# Patient Record
Sex: FEMALE | Race: WHITE | NOT HISPANIC OR LATINO | Employment: OTHER | RURAL
[De-identification: names, ages, dates, MRNs, and addresses within clinical notes are randomized per-mention and may not be internally consistent; named-entity substitution may affect disease eponyms.]

---

## 2017-01-09 ENCOUNTER — TELEPHONE (OUTPATIENT)
Dept: OBSTETRICS AND GYNECOLOGY | Facility: CLINIC | Age: 22
End: 2017-01-09

## 2017-01-10 ENCOUNTER — OFFICE VISIT (OUTPATIENT)
Dept: OBSTETRICS AND GYNECOLOGY | Facility: CLINIC | Age: 22
End: 2017-01-10

## 2017-01-10 VITALS
WEIGHT: 133 LBS | HEART RATE: 82 BPM | BODY MASS INDEX: 22.71 KG/M2 | HEIGHT: 64 IN | DIASTOLIC BLOOD PRESSURE: 70 MMHG | SYSTOLIC BLOOD PRESSURE: 128 MMHG

## 2017-01-10 DIAGNOSIS — N93.9 EPISODE OF HEAVY VAGINAL BLEEDING: Primary | ICD-10-CM

## 2017-01-10 PROCEDURE — 99213 OFFICE O/P EST LOW 20 MIN: CPT | Performed by: NURSE PRACTITIONER

## 2017-01-10 RX ORDER — MEDROXYPROGESTERONE ACETATE 10 MG/1
10 TABLET ORAL DAILY
Qty: 10 TABLET | Refills: 0 | Status: SHIPPED | OUTPATIENT
Start: 2017-01-10 | End: 2017-09-14

## 2017-01-10 NOTE — PROGRESS NOTES
"Subjective   Chief Complaint   Patient presents with   • Follow-up     pt states she has been on a period for a month      Marychuy Aburto is a 21 y.o. year old G0 presenting to be seen with complaints of trouble with her menses.   Current birth control method: OCP (estrogen/progesterone).    No LMP recorded (lmp unknown).    The last time she recalls having predictable regular cycles was prior to starting on birth control in 2011.      · Additional notable symptoms: none  · Changes in weight: weight has been stable  · Recent increase in stress? no  · Is there associated pain ? yes    Past 6 month menstrual history:    Cycle Frequency: irregular  frequent   Menstrual cycle character: just in the past few days (describes as explosive diarrhea); usually would be \"normal\"   Cycle Duration: 4 - 10   Number of heavy days of flows: 3   Dysmenorrhea: moderate and affecting her activities of daily living   PMS: moderate, affecting her activities of daily living and crying daily   Intermenstrual bleeding present: {yes   Post-coital bleeding present: no     She is sexually active.  In the past 12 months there has not been new sexual partners.  Condoms are typically used.  She would not like to be screened for STD's at today's exam.     Has been on Sprintec, Michell, Sirisha, Jolessa/Seasonique, Ortho Tricyclen, Junel and Ovcon. She had a period every 21 days that last 4 days with Seasonique. Since she changed to Junel in December her moods have been all over the place and she's been bleeding almost daily. We switched her to Ovcon in early January b/c she was so nguyen and she was bleeding daily. The increase in estrogen has caused her to bleed through a super plus tampon every hour or sooner for the past 3 days. She describes it as explosive diarrhea with blood.    The following portions of the patient's history were reviewed and updated as appropriate:problem list, current medications, allergies, past medical history, past " "social history and past surgical history    Smoking status: Never Smoker                                                              Smokeless status: Never Used                        Review of Systems   Constitutional: Positive for fatigue. Negative for activity change, appetite change, chills, fever and unexpected weight change.   Respiratory: Negative for apnea, chest tightness and shortness of breath.    Cardiovascular: Negative for chest pain, palpitations and leg swelling.   Gastrointestinal: Negative for abdominal pain.   Endocrine: Negative.    Genitourinary: Positive for menstrual problem, pelvic pain and vaginal bleeding. Negative for vaginal discharge and vaginal pain.   Musculoskeletal: Negative for gait problem and myalgias.   Neurological: Positive for weakness. Negative for dizziness, light-headedness and headaches.   Hematological: Negative for adenopathy.   Psychiatric/Behavioral: Positive for dysphoric mood. Negative for agitation, decreased concentration and sleep disturbance. The patient is not nervous/anxious.          Objective   Visit Vitals   • /70   • Pulse 82   • Ht 64\" (162.6 cm)   • Wt 133 lb (60.3 kg)   • LMP  (LMP Unknown)  Comment: pt states she has been bleeding for a month    • Breastfeeding No   • BMI 22.83 kg/m2       General:  well developed; well nourished  no acute distress  appears stated age   Skin:  No suspicious lesions seen   Thyroid: normal to inspection and palpation   Lungs:  breathing is unlabored   Heart:  regular rate and rhythm, S1, S2 normal, no murmur, click, rub or gallop   Breasts:  Not performed.   Abdomen: Not performed.   Pelvis: Not performed.  excessive bleeding today     Lab Review   No data reviewed    Imaging   Pelvic ultrasound report         Diagnoses and all orders for this visit:    Episode of heavy vaginal bleeding  -     CBC Auto Differential  -     Comprehensive Metabolic Panel  -     Estradiol  -     Follicle Stimulating Hormone  -     " Iron Profile  -     Progesterone  -     T3  -     T4, Free  -     TSH  -     Insulin, Fasting    Other orders  -     medroxyPROGESTERone (PROVERA) 10 MG tablet; Take 1 tablet by mouth Daily.        New Medications Ordered This Visit   Medications   • medroxyPROGESTERone (PROVERA) 10 MG tablet     Sig: Take 1 tablet by mouth Daily.     Dispense:  10 tablet     Refill:  0     She's going to stop taking her OCPs and start provera; hopefully to help stop her current bleeding. She can either go back to [facility], try a Mirena IUD or stop all hormonal contraception all together. She's going to take a couple of months w/o hormones and see how her periods are; she may decide later that she wants to try Mirena. Will call with results when available.    This note was electronically signed.    Valeria Carlisle, DEYA    January 10, 2017

## 2017-01-11 LAB
ALBUMIN SERPL-MCNC: 3.9 GM/DL (ref 3.4–4.8)
ALP SERPL-CCNC: 47 U/L (ref 38–126)
ALT SERPL-CCNC: 26 U/L (ref 9–52)
ANION GAP SERPL CALCULATED.3IONS-SCNC: 12 MMOL/L (ref 5–15)
AST SERPL-CCNC: 25 U/L (ref 14–36)
BILIRUB SERPL-MCNC: 0.8 MG/DL (ref 0.2–1.3)
BUN SERPL-MCNC: 12 MG/DL (ref 7–21)
CALCIUM SERPL-MCNC: 9 MG/DL (ref 8.4–10.2)
CHLORIDE SERPL-SCNC: 103 MMOL/L (ref 95–110)
CO2 SERPL-SCNC: 26 MMOL/L (ref 22–31)
CREAT SERPL-MCNC: 0.8 MG/DL (ref 0.5–1)
GLUCOSE SERPL-MCNC: 89 MG/DL (ref 60–100)
IRON SATN MFR SERPL: 37.5 % (ref 15–50)
IRON SERPL-MCNC: 141 UG/DL (ref 37–170)
POTASSIUM SERPL-SCNC: 4.2 MMOL/L (ref 3.5–5.1)
PROT SERPL-MCNC: 6.8 GM/DL (ref 6.3–8.6)
SODIUM SERPL-SCNC: 141 MMOL/L (ref 137–145)
T4 FREE SERPL-MCNC: 1.04 NG/DL (ref 0.78–2.19)
TIBC SERPL-MCNC: 376 UG/DL (ref 265–497)
TSH SERPL-ACNC: 2.22 UIU/ML (ref 0.46–4.68)

## 2017-01-12 LAB
ESTRADIOL SERPL-MCNC: 68.7 PG/ML
PROGEST SERPL-MCNC: 0.4 NG/ML

## 2017-01-13 LAB — FSH SERPL-ACNC: 8.6 MIU/ML

## 2017-01-18 ENCOUNTER — TELEPHONE (OUTPATIENT)
Dept: OBSTETRICS AND GYNECOLOGY | Facility: CLINIC | Age: 22
End: 2017-01-18

## 2017-01-18 NOTE — TELEPHONE ENCOUNTER
All hormones WNL. Did not take the Provera; her period stopped 3 days after she stopped her OCPs. Wants to stay off of hormones until her annual in April then will possibly be wanting the Paragard depending on how her periods are between now and then.

## 2017-02-10 ENCOUNTER — APPOINTMENT (OUTPATIENT)
Dept: LAB | Facility: HOSPITAL | Age: 22
End: 2017-02-10

## 2017-02-10 ENCOUNTER — OFFICE VISIT (OUTPATIENT)
Dept: FAMILY MEDICINE CLINIC | Facility: CLINIC | Age: 22
End: 2017-02-10

## 2017-02-10 VITALS
SYSTOLIC BLOOD PRESSURE: 110 MMHG | WEIGHT: 131 LBS | DIASTOLIC BLOOD PRESSURE: 66 MMHG | BODY MASS INDEX: 22.36 KG/M2 | HEIGHT: 64 IN | TEMPERATURE: 97.6 F

## 2017-02-10 DIAGNOSIS — J02.9 ACUTE PHARYNGITIS, UNSPECIFIED ETIOLOGY: Primary | ICD-10-CM

## 2017-02-10 DIAGNOSIS — J06.9 ACUTE URI: ICD-10-CM

## 2017-02-10 LAB
EXPIRATION DATE: NORMAL
INTERNAL CONTROL: NORMAL
Lab: NORMAL
S PYO AG THROAT QL: NEGATIVE

## 2017-02-10 PROCEDURE — 87081 CULTURE SCREEN ONLY: CPT | Performed by: FAMILY MEDICINE

## 2017-02-10 PROCEDURE — 99213 OFFICE O/P EST LOW 20 MIN: CPT | Performed by: FAMILY MEDICINE

## 2017-02-10 PROCEDURE — 87880 STREP A ASSAY W/OPTIC: CPT | Performed by: FAMILY MEDICINE

## 2017-02-10 RX ORDER — CYPROHEPTADINE HYDROCHLORIDE 2 MG/5ML
4 SOLUTION ORAL EVERY 8 HOURS
Qty: 240 ML | Refills: 12 | Status: SHIPPED | OUTPATIENT
Start: 2017-02-10 | End: 2017-09-14

## 2017-02-10 NOTE — PROGRESS NOTES
Subjective   Marychuy Aburto is a 21 y.o. female.     History of Present Illness URI sore throat past to 3 days.  Works in school system.  Has not had a flu vaccine.  No fever.  Has had strep recently.  Medicines noted.    The following portions of the patient's history were reviewed and updated as appropriate: allergies, current medications, past family history, past medical history, past social history, past surgical history and problem list.    Review of Systems   Constitutional: Negative for activity change, appetite change, fatigue and unexpected weight change.   HENT: Positive for sore throat. Negative for trouble swallowing and voice change.    Eyes: Negative for redness and visual disturbance.   Respiratory: Positive for cough. Negative for wheezing.    Cardiovascular: Negative for chest pain and palpitations.   Gastrointestinal: Negative for abdominal pain, constipation, diarrhea, nausea and vomiting.   Genitourinary: Negative for urgency.   Musculoskeletal: Negative for joint swelling.   Neurological: Negative for syncope and headaches.   Hematological: Negative for adenopathy.   Psychiatric/Behavioral: Negative for sleep disturbance.       Objective   Physical Exam   Constitutional: She is oriented to person, place, and time. She appears well-developed.   HENT:   Head: Normocephalic.   Right Ear: External ear normal.   Nose: Nose normal.   Mouth/Throat: Posterior oropharyngeal erythema present.   Eyes: Pupils are equal, round, and reactive to light.   Neck: Normal range of motion. No thyromegaly present.   Cardiovascular: Normal rate, regular rhythm, normal heart sounds and intact distal pulses.  Exam reveals no gallop and no friction rub.    No murmur heard.  Pulmonary/Chest: Breath sounds normal.   Abdominal: Soft. She exhibits no distension and no mass. There is no tenderness.   Musculoskeletal: Normal range of motion.   Neurological: She is alert and oriented to person, place, and time. She has  normal reflexes.   Skin: Skin is warm and dry.   Psychiatric: She has a normal mood and affect.   strep neg    Assessment/Plan   Problems Addressed this Visit     None      Visit Diagnoses     Acute pharyngitis, unspecified etiology    -  Primary    Relevant Orders    POC Rapid Strep A    Beta Strep Culture, Throat    Acute URI            Medication for symptom relief gargles fluids other symptomatic preventative measures discussed  getting a flu vaccine every fall recheck as directed

## 2017-02-13 LAB — BACTERIA SPEC AEROBE CULT: NORMAL

## 2017-09-14 ENCOUNTER — OFFICE VISIT (OUTPATIENT)
Dept: FAMILY MEDICINE CLINIC | Facility: CLINIC | Age: 22
End: 2017-09-14

## 2017-09-14 VITALS
DIASTOLIC BLOOD PRESSURE: 70 MMHG | HEIGHT: 64 IN | SYSTOLIC BLOOD PRESSURE: 114 MMHG | BODY MASS INDEX: 23.22 KG/M2 | HEART RATE: 81 BPM | WEIGHT: 136 LBS

## 2017-09-14 DIAGNOSIS — Z00.00 GENERAL MEDICAL EXAM: Primary | ICD-10-CM

## 2017-09-14 PROCEDURE — 99395 PREV VISIT EST AGE 18-39: CPT | Performed by: NURSE PRACTITIONER

## 2017-09-14 NOTE — PROGRESS NOTES
"  Chief Complaint   Patient presents with   • Annual Exam     school physical for student teaching     Subjective   Marychuy Aburto is a 21 y.o. female.     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    Review of Systems   Constitutional: Negative.  Negative for activity change, appetite change and chills.   HENT: Negative.    Eyes: Negative.    Respiratory: Negative.    Cardiovascular: Negative.    Gastrointestinal: Negative.    Endocrine: Negative.    Genitourinary: Negative.  Negative for decreased urine volume, difficulty urinating, dyspareunia, dysuria, enuresis, flank pain, frequency, genital sores, hematuria, menstrual problem, pelvic pain, urgency, vaginal bleeding, vaginal discharge and vaginal pain.        Abnormal menstrual cycle    Musculoskeletal: Negative.    Skin: Negative.    Allergic/Immunologic: Negative.  Negative for environmental allergies, food allergies and immunocompromised state.   Neurological: Negative.    Hematological: Negative.  Negative for adenopathy. Does not bruise/bleed easily.   Psychiatric/Behavioral: Negative.  Negative for self-injury and sleep disturbance. The patient is not nervous/anxious.    All other systems reviewed and are negative.      Objective   /70  Pulse 81  Ht 64\" (162.6 cm)  Wt 136 lb (61.7 kg)  BMI 23.34 kg/m2  Physical Exam   Constitutional: She is oriented to person, place, and time. She appears well-developed and well-nourished. No distress.   HENT:   Head: Normocephalic and atraumatic.   Mouth/Throat: No oropharyngeal exudate.   Eyes: EOM are normal. Pupils are equal, round, and reactive to light. Right eye exhibits no discharge. Left eye exhibits no discharge. No scleral icterus.   Neck: Normal range of motion. Neck supple. No JVD present. No tracheal " deviation present. No thyromegaly present.   Cardiovascular: Normal rate, regular rhythm, normal heart sounds and normal pulses.  Exam reveals no gallop and no friction rub.    No murmur heard.  Pulmonary/Chest: Effort normal and breath sounds normal. No stridor. No respiratory distress. She has no wheezes. She has no rales. She exhibits no tenderness.   Abdominal: Soft. She exhibits no distension and no mass. There is no tenderness. There is no rebound and no guarding. No hernia.   Low pelvic pain and tenderness    Genitourinary: Rectum normal. Pelvic exam was performed with patient supine. Uterus is not deviated, not enlarged, not fixed and not tender. Cervix exhibits no motion tenderness, no discharge and no friability. Right adnexum displays no mass. Left adnexum displays no mass.   Musculoskeletal: Normal range of motion. She exhibits no edema, tenderness or deformity.   Lymphadenopathy:     She has no cervical adenopathy.   Neurological: She is alert and oriented to person, place, and time. She displays normal reflexes. No cranial nerve deficit. She exhibits normal muscle tone. Coordination normal.   Skin: Skin is warm and dry. No rash noted. She is not diaphoretic. No erythema. No pallor.   Psychiatric: She has a normal mood and affect.   Nursing note and vitals reviewed.      Assessment/Plan   Problem List Items Addressed This Visit        Other    General medical exam - Primary         No orders of the defined types were placed in this encounter.  no problems noted at this time-continues to follow with gyn for abnormal menstrual cycles       It's not just what you eat, but when you eat  Eat breakfast, and eat smaller meals throughout the day. A healthy breakfast can jumpstart your metabolism, while eating small, healthy meals (rather than the standard three large meals) keeps your energy up.   Avoid eating at night. Try to eat dinner earlier and fast for 14-16 hours until breakfast the next morning. Studies  suggest that eating only when you’re most active and giving your digestive system a long break each day may help to regulate weight.

## 2017-10-25 ENCOUNTER — PROCEDURE VISIT (OUTPATIENT)
Dept: OBSTETRICS AND GYNECOLOGY | Facility: CLINIC | Age: 22
End: 2017-10-25

## 2017-10-25 VITALS
SYSTOLIC BLOOD PRESSURE: 121 MMHG | WEIGHT: 138 LBS | BODY MASS INDEX: 23.56 KG/M2 | HEART RATE: 96 BPM | HEIGHT: 64 IN | DIASTOLIC BLOOD PRESSURE: 75 MMHG

## 2017-10-25 DIAGNOSIS — Z01.419 WELL WOMAN EXAM WITH ROUTINE GYNECOLOGICAL EXAM: Primary | ICD-10-CM

## 2017-10-25 PROCEDURE — 88142 CYTOPATH C/V THIN LAYER: CPT | Performed by: NURSE PRACTITIONER

## 2017-10-25 PROCEDURE — 99395 PREV VISIT EST AGE 18-39: CPT | Performed by: NURSE PRACTITIONER

## 2017-10-25 NOTE — PROGRESS NOTES
Subjective   Marychuy Aburto is a 21 y.o. G0 here for annual exam. Has no complaints at this time.    Gynecologic Exam   The patient's pertinent negatives include no genital itching, genital lesions, genital odor, genital rash, missed menses, pelvic pain, vaginal bleeding or vaginal discharge. Pertinent negatives include no abdominal pain, dysuria, headaches or rash. She is sexually active. No, her partner does not have an STD. She uses condoms and the rhythm method for contraception. Her menstrual history has been regular.   LMP- 2 weeks ago; monthly, lasts for 5-7 days with moderate flow. Has not been on contraception in several months because she was bleeding daily on several different OCPs.    The following portions of the patient's history were reviewed and updated as appropriate: allergies, current medications, past family history, past medical history, past social history, past surgical history and problem list.    Review of Systems   Constitutional: Negative for activity change, appetite change, fatigue and unexpected weight change.   Respiratory: Negative for chest tightness and shortness of breath.    Cardiovascular: Negative for chest pain, palpitations and leg swelling.   Gastrointestinal: Negative for abdominal distention and abdominal pain.   Endocrine: Negative.    Genitourinary: Negative for difficulty urinating, dyspareunia, dysuria, genital sores, menstrual problem, missed menses, pelvic pain, vaginal bleeding, vaginal discharge and vaginal pain.   Musculoskeletal: Negative for gait problem and myalgias.   Skin: Negative for color change, pallor and rash.   Neurological: Negative for dizziness, weakness, light-headedness and headaches.   Psychiatric/Behavioral: Negative for agitation, dysphoric mood and sleep disturbance. The patient is not nervous/anxious.        Objective   Physical Exam   Constitutional: She is oriented to person, place, and time. She appears well-developed and well-nourished.    Cardiovascular: Normal rate, regular rhythm, normal heart sounds and intact distal pulses.    Pulmonary/Chest: Effort normal and breath sounds normal. Right breast exhibits no inverted nipple, no mass, no nipple discharge, no skin change and no tenderness. Left breast exhibits no inverted nipple, no mass, no nipple discharge, no skin change and no tenderness. Breasts are symmetrical. There is no breast swelling.   Abdominal: Soft. Bowel sounds are normal. She exhibits no distension. There is no tenderness.   Genitourinary: Vagina normal and uterus normal. No breast bleeding. No labial fusion. There is no rash, tenderness, lesion or injury on the right labia. There is no rash, tenderness, lesion or injury on the left labia. Cervix exhibits no motion tenderness, no discharge and no friability. Right adnexum displays no mass, no tenderness and no fullness. Left adnexum displays no mass, no tenderness and no fullness.   Genitourinary Comments: Pap smear obtained.   Lymphadenopathy:     She has no axillary adenopathy.        Right: No inguinal adenopathy present.        Left: No inguinal adenopathy present.   Neurological: She is alert and oriented to person, place, and time.   Skin: Skin is warm, dry and intact.   Psychiatric: She has a normal mood and affect. Her behavior is normal.   Nursing note and vitals reviewed.        Assessment/Plan   Marychuy was seen today for gynecologic exam.    Diagnoses and all orders for this visit:    Well woman exam with routine gynecological exam  -     Liquid-based Pap Smear, Screening - ThinPrep Vial, Cervix, Endocervix

## 2017-10-30 LAB
LAB AP CASE REPORT: NORMAL
LAB AP GYN ADDITIONAL INFORMATION: NORMAL
Lab: NORMAL
PATH INTERP SPEC-IMP: NORMAL
STAT OF ADQ CVX/VAG CYTO-IMP: NORMAL

## 2017-12-21 ENCOUNTER — APPOINTMENT (OUTPATIENT)
Dept: LAB | Facility: HOSPITAL | Age: 22
End: 2017-12-21

## 2017-12-21 ENCOUNTER — OFFICE VISIT (OUTPATIENT)
Dept: FAMILY MEDICINE CLINIC | Facility: CLINIC | Age: 22
End: 2017-12-21

## 2017-12-21 VITALS
DIASTOLIC BLOOD PRESSURE: 80 MMHG | BODY MASS INDEX: 24.24 KG/M2 | SYSTOLIC BLOOD PRESSURE: 120 MMHG | HEIGHT: 64 IN | WEIGHT: 142 LBS

## 2017-12-21 DIAGNOSIS — R10.13 EPIGASTRIC PAIN: Primary | ICD-10-CM

## 2017-12-21 DIAGNOSIS — T78.1XXA GASTROINTESTINAL FOOD SENSITIVITY: ICD-10-CM

## 2017-12-21 DIAGNOSIS — R53.81 MALAISE: ICD-10-CM

## 2017-12-21 LAB
ALBUMIN SERPL-MCNC: 4.2 G/DL (ref 3.4–4.8)
ALBUMIN/GLOB SERPL: 1.4 G/DL (ref 1.1–1.8)
ALP SERPL-CCNC: 59 U/L (ref 38–126)
ALT SERPL W P-5'-P-CCNC: 19 U/L (ref 9–52)
ANION GAP SERPL CALCULATED.3IONS-SCNC: 10 MMOL/L (ref 5–15)
AST SERPL-CCNC: 45 U/L (ref 14–36)
BASOPHILS # BLD AUTO: 0.02 10*3/MM3 (ref 0–0.2)
BASOPHILS NFR BLD AUTO: 0.3 % (ref 0–2)
BILIRUB SERPL-MCNC: 0.5 MG/DL (ref 0.2–1.3)
BUN BLD-MCNC: 13 MG/DL (ref 7–21)
BUN/CREAT SERPL: 15.7 (ref 7–25)
CALCIUM SPEC-SCNC: 9.3 MG/DL (ref 8.4–10.2)
CHLORIDE SERPL-SCNC: 104 MMOL/L (ref 95–110)
CO2 SERPL-SCNC: 24 MMOL/L (ref 22–31)
CREAT BLD-MCNC: 0.83 MG/DL (ref 0.5–1)
DEPRECATED RDW RBC AUTO: 49.1 FL (ref 36.4–46.3)
EOSINOPHIL # BLD AUTO: 0.15 10*3/MM3 (ref 0–0.7)
EOSINOPHIL NFR BLD AUTO: 2 % (ref 0–7)
ERYTHROCYTE [DISTWIDTH] IN BLOOD BY AUTOMATED COUNT: 14.3 % (ref 11.5–14.5)
GFR SERPL CREATININE-BSD FRML MDRD: 84 ML/MIN/1.73 (ref 71–165)
GLOBULIN UR ELPH-MCNC: 3 GM/DL (ref 2.3–3.5)
GLUCOSE BLD-MCNC: 91 MG/DL (ref 60–100)
HCT VFR BLD AUTO: 40.5 % (ref 35–45)
HGB BLD-MCNC: 13.4 G/DL (ref 12–15.5)
IMM GRANULOCYTES # BLD: 0.02 10*3/MM3 (ref 0–0.02)
IMM GRANULOCYTES NFR BLD: 0.3 % (ref 0–0.5)
LYMPHOCYTES # BLD AUTO: 1.89 10*3/MM3 (ref 0.6–4.2)
LYMPHOCYTES NFR BLD AUTO: 25.8 % (ref 10–50)
MCH RBC QN AUTO: 30.9 PG (ref 26.5–34)
MCHC RBC AUTO-ENTMCNC: 33.1 G/DL (ref 31.4–36)
MCV RBC AUTO: 93.3 FL (ref 80–98)
MONOCYTES # BLD AUTO: 0.37 10*3/MM3 (ref 0–0.9)
MONOCYTES NFR BLD AUTO: 5.1 % (ref 0–12)
NEUTROPHILS # BLD AUTO: 4.87 10*3/MM3 (ref 2–8.6)
NEUTROPHILS NFR BLD AUTO: 66.5 % (ref 37–80)
PLATELET # BLD AUTO: 301 10*3/MM3 (ref 150–450)
PMV BLD AUTO: 11 FL (ref 8–12)
POTASSIUM BLD-SCNC: 4.1 MMOL/L (ref 3.5–5.1)
PROT SERPL-MCNC: 7.2 G/DL (ref 6.3–8.6)
RBC # BLD AUTO: 4.34 10*6/MM3 (ref 3.77–5.16)
SODIUM BLD-SCNC: 138 MMOL/L (ref 137–145)
TSH SERPL DL<=0.05 MIU/L-ACNC: 2.12 MIU/ML (ref 0.46–4.68)
WBC NRBC COR # BLD: 7.32 10*3/MM3 (ref 3.2–9.8)

## 2017-12-21 PROCEDURE — 80053 COMPREHEN METABOLIC PANEL: CPT | Performed by: NURSE PRACTITIONER

## 2017-12-21 PROCEDURE — 86003 ALLG SPEC IGE CRUDE XTRC EA: CPT | Performed by: NURSE PRACTITIONER

## 2017-12-21 PROCEDURE — 83516 IMMUNOASSAY NONANTIBODY: CPT | Performed by: NURSE PRACTITIONER

## 2017-12-21 PROCEDURE — 86255 FLUORESCENT ANTIBODY SCREEN: CPT | Performed by: NURSE PRACTITIONER

## 2017-12-21 PROCEDURE — 99213 OFFICE O/P EST LOW 20 MIN: CPT | Performed by: NURSE PRACTITIONER

## 2017-12-21 PROCEDURE — 36415 COLL VENOUS BLD VENIPUNCTURE: CPT | Performed by: NURSE PRACTITIONER

## 2017-12-21 PROCEDURE — 84443 ASSAY THYROID STIM HORMONE: CPT | Performed by: NURSE PRACTITIONER

## 2017-12-21 PROCEDURE — 85025 COMPLETE CBC W/AUTO DIFF WBC: CPT | Performed by: NURSE PRACTITIONER

## 2017-12-21 RX ORDER — ONDANSETRON 4 MG/1
4 TABLET, ORALLY DISINTEGRATING ORAL EVERY 8 HOURS PRN
Qty: 20 TABLET | Refills: 5 | Status: SHIPPED | OUTPATIENT
Start: 2017-12-21 | End: 2018-01-15

## 2017-12-21 RX ORDER — PANTOPRAZOLE SODIUM 40 MG/1
40 TABLET, DELAYED RELEASE ORAL DAILY
Qty: 30 TABLET | Refills: 11 | Status: SHIPPED | OUTPATIENT
Start: 2017-12-21 | End: 2018-01-15

## 2017-12-21 NOTE — PROGRESS NOTES
Chief Complaint   Patient presents with   • GI Problem     over the last 4 months alot of stomach cramps      Subjective   Marychuy Aburto is a 22 y.o. female.     Abdominal Pain   This is a new problem. The current episode started 1 to 4 weeks ago. The onset quality is gradual. The problem occurs constantly. The problem has been gradually worsening. The pain is located in the epigastric region and RUQ. The pain is at a severity of 8/10. The pain is severe. The quality of the pain is dull, a sensation of fullness, colicky and cramping. The abdominal pain radiates to the epigastric region. Associated symptoms include belching and nausea. Pertinent negatives include no anorexia, arthralgias, constipation, diarrhea, fever, flatus, frequency, headaches, hematochezia, hematuria, melena, myalgias, vomiting or weight loss. The pain is aggravated by eating (certain foods make it worse -red meats). She has tried acetaminophen and antacids for the symptoms. The treatment provided mild relief. There is no history of abdominal surgery, colon cancer, Crohn's disease, gallstones, GERD, irritable bowel syndrome, pancreatitis, PUD or ulcerative colitis.        The following portions of the patient's history were reviewed and updated as appropriate: allergies, current medications, past social history and problem list.    Review of Systems   Constitutional: Negative.  Negative for fever and weight loss.   Eyes: Negative.    Respiratory: Negative.    Cardiovascular: Negative.    Gastrointestinal: Positive for abdominal distention, abdominal pain and nausea. Negative for anal bleeding, anorexia, blood in stool, constipation, diarrhea, flatus, hematochezia, melena, rectal pain and vomiting.        Nausea and abdominal worse with certain foods    Endocrine: Negative.    Genitourinary: Negative.  Negative for frequency and hematuria.   Musculoskeletal: Negative.  Negative for arthralgias and myalgias.   Skin: Negative.   "  Allergic/Immunologic: Negative.    Neurological: Negative.  Negative for headaches.   Hematological: Negative.    Psychiatric/Behavioral: Positive for agitation. Negative for dysphoric mood, hallucinations, sleep disturbance and suicidal ideas. The patient is nervous/anxious. The patient is not hyperactive.    All other systems reviewed and are negative.      Objective   /80  Ht 162.6 cm (64\")  Wt 64.4 kg (142 lb)  BMI 24.37 kg/m2  Physical Exam   Constitutional: She is oriented to person, place, and time. She appears well-developed and well-nourished. No distress.   HENT:   Head: Normocephalic and atraumatic.   Mouth/Throat: No oropharyngeal exudate.   Eyes: EOM are normal. Pupils are equal, round, and reactive to light. Right eye exhibits no discharge. Left eye exhibits no discharge. No scleral icterus.   Neck: Normal range of motion. Neck supple. No JVD present. No tracheal deviation present. No thyromegaly present.   Cardiovascular: Normal rate, regular rhythm, normal heart sounds and normal pulses.  Exam reveals no gallop and no friction rub.    No murmur heard.  Pulmonary/Chest: Effort normal and breath sounds normal. No stridor. No respiratory distress. She has no wheezes. She has no rales. She exhibits no tenderness.   Abdominal: Soft. She exhibits no distension and no mass. There is tenderness. There is no rebound and no guarding. No hernia.   Epigastric pain with exam-right upper quadrant and epigastric    Genitourinary: Rectum normal. Pelvic exam was performed with patient supine. Uterus is not deviated, not enlarged, not fixed and not tender. Cervix exhibits no motion tenderness, no discharge and no friability. Right adnexum displays no mass. Left adnexum displays no mass.   Musculoskeletal: Normal range of motion. She exhibits no edema, tenderness or deformity.   Lymphadenopathy:     She has no cervical adenopathy.   Neurological: She is alert and oriented to person, place, and time. She " displays normal reflexes. No cranial nerve deficit. She exhibits normal muscle tone. Coordination normal.   Skin: Skin is warm and dry. No rash noted. She is not diaphoretic. No erythema. No pallor.   Psychiatric: She has a normal mood and affect.   Nursing note and vitals reviewed.      Assessment/Plan   Problem List Items Addressed This Visit        Nervous and Auditory    Epigastric pain - Primary    Relevant Medications    pantoprazole (PROTONIX) 40 MG EC tablet    ondansetron ODT (ZOFRAN ODT) 4 MG disintegrating tablet    Other Relevant Orders    Food Allergy Profile    Celiac Disease Panel    CBC & Differential    Comprehensive Metabolic Panel    TSH    US Gallbladder    CBC Auto Differential    Alpha Gal IgE       Other    Malaise    Relevant Medications    pantoprazole (PROTONIX) 40 MG EC tablet    ondansetron ODT (ZOFRAN ODT) 4 MG disintegrating tablet    Other Relevant Orders    Food Allergy Profile    Celiac Disease Panel    CBC & Differential    Comprehensive Metabolic Panel    TSH    CBC Auto Differential    Alpha Gal IgE      Other Visit Diagnoses     Gastrointestinal food sensitivity        Relevant Orders    Alpha Gal IgE           New Medications Ordered This Visit   Medications   • pantoprazole (PROTONIX) 40 MG EC tablet     Sig: Take 1 tablet by mouth Daily.     Dispense:  30 tablet     Refill:  11   • ondansetron ODT (ZOFRAN ODT) 4 MG disintegrating tablet     Sig: Take 1 tablet by mouth Every 8 (Eight) Hours As Needed for Nausea or Vomiting.     Dispense:  20 tablet     Refill:  5       It's not just what you eat, but when you eat  Eat breakfast, and eat smaller meals throughout the day. A healthy breakfast can jumpstart your metabolism, while eating small, healthy meals (rather than the standard three large meals) keeps your energy up.   Avoid eating at night. Try to eat dinner earlier and fast for 14-16 hours until breakfast the next morning. Studies suggest that eating only when you’re most  active and giving your digestive system a long break each day may help to regulate weight.     Monitor diet closely for now-add protonix and zofran prn -will notify of results when available.     Also consider stress as the reason

## 2017-12-22 LAB
ENDOMYSIUM IGA SER QL: NEGATIVE
IGA SERPL-MCNC: 90 MG/DL (ref 87–352)
TTG IGA SER-ACNC: <2 U/ML (ref 0–3)

## 2017-12-27 LAB
ALPHA GAL IGE: <0.1 KU/L
CALIF WALNUT POLN IGE QN: <0.1 KU/L
CLAM IGE QN: <0.1 KU/L
CODFISH IGE QN: <0.1 KU/L
CONV CLASS DESCRIPTION: ABNORMAL
CORN IGE QN: <0.1 KU/L
COW MILK IGE QN: 0.14 KU/L
EGG WHITE IGE QN: <0.1 KU/L
PEANUT IGE QN: <0.1 KU/L
SCALLOP IGE QN: <0.1 KU/L
SESAME SEED IGE: <0.1 KU/L
SHRIMP IGE: <0.1 KU/L
SOYBEAN IGE QN: <0.1 KU/L
WHEAT IGE QN: <0.1 KU/L

## 2017-12-28 ENCOUNTER — HOSPITAL ENCOUNTER (OUTPATIENT)
Dept: ULTRASOUND IMAGING | Facility: HOSPITAL | Age: 22
Discharge: HOME | End: 2017-12-28
Admitting: NURSE PRACTITIONER

## 2017-12-28 PROCEDURE — 76705 ECHO EXAM OF ABDOMEN: CPT

## 2017-12-29 DIAGNOSIS — D18.00 HEMANGIOMA: Primary | ICD-10-CM

## 2018-01-05 ENCOUNTER — HOSPITAL ENCOUNTER (OUTPATIENT)
Dept: CT IMAGING | Facility: HOSPITAL | Age: 23
Discharge: HOME | End: 2018-01-05
Admitting: NURSE PRACTITIONER

## 2018-01-05 DIAGNOSIS — D18.00 HEMANGIOMA: ICD-10-CM

## 2018-01-05 PROCEDURE — 71250 CT THORAX DX C-: CPT

## 2018-01-25 ENCOUNTER — OFFICE VISIT (OUTPATIENT)
Dept: CARDIAC SURGERY | Facility: CLINIC | Age: 23
End: 2018-01-25

## 2018-01-25 VITALS
WEIGHT: 146.8 LBS | SYSTOLIC BLOOD PRESSURE: 118 MMHG | HEIGHT: 64 IN | DIASTOLIC BLOOD PRESSURE: 64 MMHG | BODY MASS INDEX: 25.06 KG/M2 | HEART RATE: 77 BPM | OXYGEN SATURATION: 99 %

## 2018-01-25 DIAGNOSIS — F41.9 ANXIETY: ICD-10-CM

## 2018-01-25 DIAGNOSIS — F32.A DEPRESSION, UNSPECIFIED DEPRESSION TYPE: ICD-10-CM

## 2018-01-25 DIAGNOSIS — D18.00 CAVERNOUS HEMANGIOMA: Primary | ICD-10-CM

## 2018-01-25 PROCEDURE — 99214 OFFICE O/P EST MOD 30 MIN: CPT | Performed by: THORACIC SURGERY (CARDIOTHORACIC VASCULAR SURGERY)

## 2018-01-29 PROBLEM — D18.00 CAVERNOUS HEMANGIOMA: Status: ACTIVE | Noted: 2018-01-29

## 2018-01-29 NOTE — PROGRESS NOTES
1/25/2018    Marychuy Aburto  1995    Chief Complaint:    Chief Complaint   Patient presents with   • hemangioma       HPI:      PCP:  Caroline Dyer, APRN    22 y.o. female with anterior mediastinal cavernous hemangioma, anxiety, depression..  never smoked. Lately when eating, runs right thru. occasional heart racing.  no difficulty with breathing, swallowing, exercise tolerance. .  No other associated signs, symptoms or modifying factors.    4/2011 PET CT:  4cm RIGHT paratracheal mass (SUV 2.5)  4/21/2011 Mediastinoscopy :  cavernous hemangioma  10/19/2012 CT chest :  [id number] ant mediastinal mass. (cavernous hemangioma)    11/18/2013 CT chest :  [id number] ant mediastinal mass.  (cavernous hemangioma)  no significant change in size over past year.  12/2015 CT Chest:  [id number] ant mediastinal mass.  (cavernous hemangioma), granulomatous lung disease.  1/5/2018 CT Chest:  [id number] ant mediastinal mass.  (cavernous hemangioma)    The following portions of the patient's history were reviewed and updated as appropriate: allergies, current medications, past family history, past medical history, past social history, past surgical history and problem list.  Recent images independently reviewed.  Available laboratory values reviewed.    PMH:  Past Medical History:   Diagnosis Date   • Acute sinusitis    • Agoraphobia with panic attacks    • Chronic depression    • Corneal abrasion    • Dysfunction of eustachian tube    • Encounter for general counseling on prescription of oral contraceptives    • Generalized anxiety disorder    • Hemangioma     mediastinal cavernous [id number] stable      • Herpes simplex    • Iron deficiency anemia    • Laryngitis    • Malaise and fatigue    • Moderate Vaginal discharge     reports frequent yeast infections      • Otitis media    • Ovarian cyst    • Streptococcal pharyngitis    • Syncope    • Tracheitis    • Verruca plantaris        ALLERGIES:  Allergies   Allergen Reactions   • Lorabid  [Loracarbef] Rash         MEDICATIONS:  No current outpatient prescriptions on file.    Review of Systems   Review of Systems   Constitution: Negative for weakness, malaise/fatigue and weight loss.   Cardiovascular: Negative for chest pain, claudication and dyspnea on exertion.   Respiratory: Negative for cough and shortness of breath.    Skin: Negative for color change and poor wound healing.   Gastrointestinal: Positive for diarrhea.   Neurological: Negative for dizziness and numbness.   Psychiatric/Behavioral: Positive for depression. The patient is nervous/anxious.        Physical Exam   Physical Exam   Constitutional: She is oriented to person, place, and time. She is active and cooperative. She does not appear ill. No distress.   HENT:   Right Ear: Hearing normal.   Left Ear: Hearing normal.   Nose: No nasal deformity. No epistaxis.   Mouth/Throat: She does not have dentures. Normal dentition.   Cardiovascular: Normal rate and regular rhythm.    No murmur heard.  Pulses:       Carotid pulses are 2+ on the right side, and 2+ on the left side.       Radial pulses are 2+ on the right side, and 2+ on the left side.        Dorsalis pedis pulses are 2+ on the right side, and 2+ on the left side.        Posterior tibial pulses are 2+ on the right side, and 2+ on the left side.   Pulmonary/Chest: Effort normal and breath sounds normal.   Abdominal: Soft. She exhibits no distension and no mass. There is no tenderness.   Musculoskeletal: She exhibits no deformity.   Gait normal.    Neurological: She is alert and oriented to person, place, and time. She has normal strength.   Skin: Skin is warm and dry. No cyanosis or erythema. No pallor.   No venous staining   Psychiatric: She has a normal mood and affect. Her speech is normal. Judgment and thought content normal.     Results for VINCE LOVE (MRN [id number]) as of 1/29/2018 08:40   Ref. Range 12/21/2017 11:11   Creatinine Latest Ref Range: 0.50 - 1.00 mg/dL 0.83    BUN Latest Ref Range: 7 - 21 mg/dL 13     ASSESSMENT:  Marychuy was seen today for hemangioma.    Diagnoses and all orders for this visit:    Cavernous hemangioma mediastinum    Anxiety    Depression, unspecified depression type    PLAN:  Detailed discussion with Marychuy Aburto regarding situation and options.  Stable cavernous hemangioma mediastinum, no change in size since 2011.    No intervention indicated at this time.  Risks, benefits discussed.  Understands and wishes to proceed with plan.    Return as needed, new symptoms or problems.    Recommended regular physical activity, progressive walking program.  Continue current medications as directed.    Thank you for the opportunity to participate in this patient's care.    Copy to primary care provider.    EMR Dragon/Transcription disclaimer:   Much of this encounter note is an electronic transcription/translation of spoken language to printed text. The electronic translation of spoken language may permit erroneous, or at times, nonsensical words or phrases to be inadvertently transcribed; Although I have reviewed the note for such errors, some may still exist.

## 2018-02-02 ENCOUNTER — TELEPHONE (OUTPATIENT)
Dept: FAMILY MEDICINE CLINIC | Facility: CLINIC | Age: 23
End: 2018-02-02

## 2018-02-02 RX ORDER — BUSPIRONE HYDROCHLORIDE 10 MG/1
10 TABLET ORAL 3 TIMES DAILY
Qty: 90 TABLET | Refills: 5 | Status: SHIPPED | OUTPATIENT
Start: 2018-02-02 | End: 2018-06-18

## 2018-02-02 RX ORDER — VENLAFAXINE 25 MG/1
25 TABLET ORAL DAILY
Qty: 30 TABLET | Refills: 11 | Status: SHIPPED | OUTPATIENT
Start: 2018-02-02 | End: 2018-04-09

## 2018-02-13 PROCEDURE — 87081 CULTURE SCREEN ONLY: CPT | Performed by: FAMILY MEDICINE

## 2018-02-26 ENCOUNTER — OFFICE VISIT (OUTPATIENT)
Dept: FAMILY MEDICINE CLINIC | Facility: CLINIC | Age: 23
End: 2018-02-26

## 2018-02-26 VITALS
SYSTOLIC BLOOD PRESSURE: 110 MMHG | HEIGHT: 63 IN | BODY MASS INDEX: 25.16 KG/M2 | DIASTOLIC BLOOD PRESSURE: 80 MMHG | WEIGHT: 142 LBS

## 2018-02-26 DIAGNOSIS — G47.09 OTHER INSOMNIA: ICD-10-CM

## 2018-02-26 DIAGNOSIS — L70.8 OTHER ACNE: ICD-10-CM

## 2018-02-26 DIAGNOSIS — R53.81 MALAISE: Primary | ICD-10-CM

## 2018-02-26 DIAGNOSIS — F41.9 ANXIETY: ICD-10-CM

## 2018-02-26 PROCEDURE — 99213 OFFICE O/P EST LOW 20 MIN: CPT | Performed by: NURSE PRACTITIONER

## 2018-02-26 RX ORDER — HYDROXYZINE PAMOATE 25 MG/1
CAPSULE ORAL
Qty: 60 CAPSULE | Refills: 5 | Status: SHIPPED | OUTPATIENT
Start: 2018-02-26 | End: 2018-06-18

## 2018-02-26 RX ORDER — DOXYCYCLINE 100 MG/1
100 CAPSULE ORAL 2 TIMES DAILY
Qty: 20 CAPSULE | Refills: 0 | Status: SHIPPED | OUTPATIENT
Start: 2018-02-26 | End: 2018-06-18

## 2018-02-26 NOTE — PROGRESS NOTES
Chief Complaint   Patient presents with   • Follow-up     medications   • Face breaking out   • Not Sleeping     Subjective   Marychuy Aburto is a 22 y.o. female.     Insomnia   This is a recurrent problem. The current episode started 1 to 4 weeks ago. The problem occurs constantly. The problem has been gradually worsening. Pertinent negatives include no abdominal pain, anorexia, arthralgias, change in bowel habit, chest pain, chills, congestion, coughing, diaphoresis, fatigue, fever, headaches, joint swelling, myalgias, nausea, neck pain, numbness, rash, sore throat, swollen glands, urinary symptoms, vertigo, visual change, vomiting or weakness. Nothing aggravates the symptoms. She has tried nothing for the symptoms. The treatment provided mild relief.   Rash   This is a recurrent problem. The current episode started 1 to 4 weeks ago. The problem has been gradually worsening since onset. The affected locations include the face. Pertinent negatives include no anorexia, congestion, cough, diarrhea, eye pain, facial edema, fatigue, fever, joint pain, nail changes, rhinorrhea, shortness of breath, sore throat or vomiting. The treatment provided mild relief. There is no history of allergies, asthma, eczema or varicella.        The following portions of the patient's history were reviewed and updated as appropriate: allergies, current medications, past social history and problem list.    Review of Systems   Constitutional: Negative.  Negative for activity change, appetite change, chills, diaphoresis, fatigue, fever and unexpected weight change.   HENT: Negative.  Negative for congestion, dental problem, drooling, ear discharge, ear pain, rhinorrhea, sinus pain, sinus pressure, sore throat, tinnitus and trouble swallowing.    Eyes: Negative.  Negative for photophobia, pain, discharge, redness, itching and visual disturbance.   Respiratory: Negative.  Negative for apnea, cough, choking, chest tightness, shortness of  "breath, wheezing and stridor.    Cardiovascular: Negative.  Negative for chest pain, palpitations and leg swelling.   Gastrointestinal: Negative.  Negative for abdominal distention, abdominal pain, anal bleeding, anorexia, blood in stool, change in bowel habit, constipation, diarrhea, nausea, rectal pain and vomiting.   Endocrine: Negative.  Negative for cold intolerance, heat intolerance, polydipsia, polyphagia and polyuria.   Genitourinary: Positive for menstrual problem. Negative for decreased urine volume, difficulty urinating, dyspareunia, dysuria, enuresis, flank pain, frequency, genital sores, hematuria, pelvic pain, urgency, vaginal bleeding, vaginal discharge and vaginal pain.        Abnormal menstrual cycle    Musculoskeletal: Negative.  Negative for arthralgias, back pain, gait problem, joint pain, joint swelling, myalgias, neck pain and neck stiffness.   Skin: Positive for color change. Negative for nail changes and rash.        Acne getting worse changing    Allergic/Immunologic: Negative.  Negative for environmental allergies, food allergies and immunocompromised state.   Neurological: Negative.  Negative for dizziness, vertigo, tremors, seizures, syncope, facial asymmetry, speech difficulty, weakness, numbness and headaches.   Hematological: Negative.  Negative for adenopathy. Does not bruise/bleed easily.   Psychiatric/Behavioral: Positive for decreased concentration and sleep disturbance. Negative for agitation, behavioral problems, confusion, dysphoric mood, hallucinations, self-injury and suicidal ideas. The patient has insomnia. The patient is not nervous/anxious and is not hyperactive.    All other systems reviewed and are negative.      Objective   /80  Ht 160 cm (63\")  Wt 64.4 kg (142 lb)  BMI 25.15 kg/m2  Physical Exam   Constitutional: She is oriented to person, place, and time. She appears well-developed and well-nourished. No distress.   HENT:   Head: Normocephalic and atraumatic. "   Mouth/Throat: No oropharyngeal exudate.   Eyes: Conjunctivae and EOM are normal. Pupils are equal, round, and reactive to light. Right eye exhibits no discharge. Left eye exhibits no discharge. No scleral icterus.   Neck: Normal range of motion. Neck supple. No JVD present. No tracheal deviation present. No thyromegaly present.   Cardiovascular: Normal rate, regular rhythm, normal heart sounds, intact distal pulses and normal pulses.  Exam reveals no gallop and no friction rub.    No murmur heard.  Pulmonary/Chest: Effort normal and breath sounds normal. No stridor. No respiratory distress. She has no wheezes. She has no rales. She exhibits no tenderness.   Abdominal: Soft. Bowel sounds are normal. She exhibits no distension and no mass. There is no tenderness. There is no rebound and no guarding. No hernia.   Low pelvic pain and tenderness    Genitourinary: Rectum normal. Pelvic exam was performed with patient supine. Uterus is not deviated, not enlarged, not fixed and not tender. Cervix exhibits no motion tenderness, no discharge and no friability. Right adnexum displays no mass. Left adnexum displays no mass.   Musculoskeletal: Normal range of motion. She exhibits no edema, tenderness or deformity.   Lymphadenopathy:     She has no cervical adenopathy.   Neurological: She is alert and oriented to person, place, and time. She has normal reflexes. She displays normal reflexes. No cranial nerve deficit. She exhibits normal muscle tone. Coordination normal.   Skin: Skin is warm and dry. Rash noted. She is not diaphoretic. No erythema. No pallor.   Diffuse acne    Psychiatric: She has a normal mood and affect.   Nursing note and vitals reviewed.      Assessment/Plan   Problem List Items Addressed This Visit        Musculoskeletal and Integument    Other acne    Relevant Orders    Ambulatory Referral to Dermatology       Other    Insomnia    Anxiety    Malaise - Primary           New Medications Ordered This Visit    Medications   • hydrOXYzine (VISTARIL) 25 MG capsule     Sig: Take 1-2 at night     Dispense:  60 capsule     Refill:  5   • doxycycline (MONODOX) 100 MG capsule     Sig: Take 1 capsule by mouth 2 (Two) Times a Day.     Dispense:  20 capsule     Refill:  0   • norelgestromin-ethinyl estradiol (ORTHO EVRA) 150-35 MCG/24HR     Sig: Place 1 patch on the skin 1 (One) Time Per Week.     Dispense:  3 patch     Refill:  12      vistaril to start with if does not help-add trazodone, add doxy for acne, ortho evra for acne and irregular cycles

## 2018-04-09 RX ORDER — VENLAFAXINE HYDROCHLORIDE 37.5 MG/1
37.5 CAPSULE, EXTENDED RELEASE ORAL DAILY
Qty: 30 CAPSULE | Refills: 11 | Status: SHIPPED | OUTPATIENT
Start: 2018-04-09 | End: 2018-06-18

## 2018-06-14 ENCOUNTER — APPOINTMENT (OUTPATIENT)
Dept: LAB | Facility: HOSPITAL | Age: 23
End: 2018-06-14

## 2018-06-14 DIAGNOSIS — Z32.01 POSITIVE PREGNANCY TEST: Primary | ICD-10-CM

## 2018-06-14 LAB
HCG INTACT+B SERPL-ACNC: 2732.4 MIU/ML
HCG SERPL QL: POSITIVE

## 2018-06-14 PROCEDURE — 36415 COLL VENOUS BLD VENIPUNCTURE: CPT | Performed by: NURSE PRACTITIONER

## 2018-06-14 PROCEDURE — 84703 CHORIONIC GONADOTROPIN ASSAY: CPT | Performed by: NURSE PRACTITIONER

## 2018-06-14 PROCEDURE — 84702 CHORIONIC GONADOTROPIN TEST: CPT | Performed by: NURSE PRACTITIONER

## 2018-06-18 ENCOUNTER — OFFICE VISIT (OUTPATIENT)
Dept: FAMILY MEDICINE CLINIC | Facility: CLINIC | Age: 23
End: 2018-06-18

## 2018-06-18 ENCOUNTER — APPOINTMENT (OUTPATIENT)
Dept: LAB | Facility: HOSPITAL | Age: 23
End: 2018-06-18

## 2018-06-18 VITALS
DIASTOLIC BLOOD PRESSURE: 70 MMHG | WEIGHT: 136 LBS | SYSTOLIC BLOOD PRESSURE: 112 MMHG | HEIGHT: 63 IN | BODY MASS INDEX: 24.1 KG/M2

## 2018-06-18 DIAGNOSIS — Z32.01 POSITIVE PREGNANCY TEST: Primary | ICD-10-CM

## 2018-06-18 DIAGNOSIS — R10.2 PELVIC PAIN: ICD-10-CM

## 2018-06-18 PROCEDURE — 36415 COLL VENOUS BLD VENIPUNCTURE: CPT | Performed by: NURSE PRACTITIONER

## 2018-06-18 PROCEDURE — 99213 OFFICE O/P EST LOW 20 MIN: CPT | Performed by: NURSE PRACTITIONER

## 2018-06-18 NOTE — PROGRESS NOTES
Chief Complaint   Patient presents with   • Follow-up     on blood work      Subjective   Marychuy Aburto is a 22 y.o. female.     Fatigue   This is a new problem. The current episode started in the past 7 days. The problem occurs constantly. The problem has been gradually worsening. Associated symptoms include abdominal pain and fatigue. Pertinent negatives include no anorexia, arthralgias, change in bowel habit, chest pain, chills, congestion, coughing, diaphoresis, fever, headaches, joint swelling, myalgias, nausea, neck pain, numbness, rash, sore throat, swollen glands, urinary symptoms, vertigo, visual change, vomiting or weakness. She has tried rest and relaxation for the symptoms. The treatment provided mild relief.   Pelvic Pain   The patient's primary symptoms include missed menses and pelvic pain. The patient's pertinent negatives include no genital itching, genital odor, genital rash, vaginal bleeding or vaginal discharge. This is a new problem. The current episode started in the past 7 days. The problem occurs constantly. The problem has been gradually worsening. The pain is severe. The problem affects both sides. She is pregnant. Associated symptoms include abdominal pain. Pertinent negatives include no anorexia, back pain, chills, constipation, diarrhea, discolored urine, dysuria, fever, flank pain, frequency, headaches, hematuria, joint pain, joint swelling, nausea, painful intercourse, rash, sore throat, urgency or vomiting. She has tried acetaminophen for the symptoms. The treatment provided no relief. She is sexually active. No, her partner does not have an STD. Her menstrual history has been irregular. There is no history of an abdominal surgery, a  section, an ectopic pregnancy, endometriosis, a gynecological surgery, herpes simplex, menorrhagia, metrorrhagia, miscarriage, ovarian cysts, perineal abscess, PID, an STD, a terminated pregnancy or vaginosis.        The following portions  "of the patient's history were reviewed and updated as appropriate: allergies, current medications, past social history and problem list.    Review of Systems   Constitutional: Positive for fatigue. Negative for activity change, appetite change, chills, diaphoresis, fever and unexpected weight change.   HENT: Negative.  Negative for congestion and sore throat.    Eyes: Negative.  Negative for photophobia, pain, discharge and itching.   Respiratory: Negative.  Negative for cough, shortness of breath, wheezing and stridor.    Cardiovascular: Negative.  Negative for chest pain, palpitations and leg swelling.   Gastrointestinal: Positive for abdominal pain. Negative for anorexia, change in bowel habit, constipation, diarrhea, nausea and vomiting.   Endocrine: Negative.  Negative for cold intolerance, heat intolerance, polydipsia, polyphagia and polyuria.   Genitourinary: Positive for missed menses, pelvic pain and vaginal pain. Negative for decreased urine volume, difficulty urinating, dyspareunia, dysuria, enuresis, flank pain, frequency, genital sores, hematuria, menorrhagia, menstrual problem, urgency, vaginal bleeding and vaginal discharge.        LMP May 15   Musculoskeletal: Negative.  Negative for arthralgias, back pain, gait problem, joint pain, joint swelling, myalgias, neck pain and neck stiffness.   Skin: Negative.  Negative for color change, pallor, rash and wound.   Allergic/Immunologic: Negative.  Negative for environmental allergies and food allergies.   Neurological: Negative.  Negative for dizziness, vertigo, tremors, seizures, syncope, facial asymmetry, speech difficulty, weakness, light-headedness, numbness and headaches.   Hematological: Negative.  Negative for adenopathy. Does not bruise/bleed easily.   Psychiatric/Behavioral: Negative.  Negative for agitation, behavioral problems, confusion and decreased concentration.       Objective   /70   Ht 160 cm (63\")   Wt 61.7 kg (136 lb)   BMI " 24.09 kg/m²   Physical Exam   Constitutional: She is oriented to person, place, and time. She appears well-developed and well-nourished. No distress.   HENT:   Head: Normocephalic and atraumatic.   Mouth/Throat: No oropharyngeal exudate.   Eyes: Conjunctivae and EOM are normal. Pupils are equal, round, and reactive to light. Right eye exhibits no discharge. Left eye exhibits no discharge. No scleral icterus.   Neck: Normal range of motion. Neck supple. No JVD present. No tracheal deviation present. No thyromegaly present.   Cardiovascular: Normal rate, regular rhythm, normal heart sounds, intact distal pulses and normal pulses.  Exam reveals no gallop and no friction rub.    No murmur heard.  Pulmonary/Chest: Effort normal and breath sounds normal. No stridor. No respiratory distress. She has no wheezes. She has no rales. She exhibits no tenderness.   Abdominal: Soft. Bowel sounds are normal. She exhibits no shifting dullness, no distension, no pulsatile liver, no fluid wave, no abdominal bruit, no ascites, no pulsatile midline mass and no mass. There is no hepatosplenomegaly, splenomegaly or hepatomegaly. There is tenderness in the periumbilical area and suprapubic area. There is no rebound, no guarding, no tenderness at McBurney's point and negative Jiménez's sign. No hernia. Hernia confirmed negative in the ventral area, confirmed negative in the right inguinal area and confirmed negative in the left inguinal area.       Low pelvic pain and tenderness    Genitourinary: Rectum normal. Pelvic exam was performed with patient supine. Uterus is not deviated, not enlarged, not fixed and not tender. Cervix exhibits no motion tenderness, no discharge and no friability. Right adnexum displays no mass. Left adnexum displays no mass.   Musculoskeletal: Normal range of motion. She exhibits no edema, tenderness or deformity.   Lymphadenopathy:     She has no cervical adenopathy.   Neurological: She is alert and oriented to  person, place, and time. She has normal reflexes. She displays normal reflexes. No cranial nerve deficit or sensory deficit. She exhibits normal muscle tone. Coordination normal.   Skin: Skin is warm and dry. Rash noted. She is not diaphoretic. No erythema. No pallor.   Diffuse acne    Psychiatric: She has a normal mood and affect.   Nursing note and vitals reviewed.      Assessment/Plan   Problem List Items Addressed This Visit        Nervous and Auditory    Pelvic pain    Relevant Orders    US Pelvis Complete       Other    Positive pregnancy test - Primary    Relevant Orders    US Pelvis Complete         No orders of the defined types were placed in this encounter.     us of pelvis as directed, labs today, follow up with OB as directed

## 2018-06-19 ENCOUNTER — APPOINTMENT (OUTPATIENT)
Dept: LAB | Facility: HOSPITAL | Age: 23
End: 2018-06-19

## 2018-06-19 LAB — HCG INTACT+B SERPL-ACNC: ABNORMAL MIU/ML

## 2018-06-19 PROCEDURE — 84702 CHORIONIC GONADOTROPIN TEST: CPT | Performed by: NURSE PRACTITIONER

## 2018-06-22 ENCOUNTER — HOSPITAL ENCOUNTER (OUTPATIENT)
Dept: ULTRASOUND IMAGING | Facility: HOSPITAL | Age: 23
Discharge: HOME | End: 2018-06-22
Admitting: NURSE PRACTITIONER

## 2018-06-22 PROCEDURE — 76817 TRANSVAGINAL US OBSTETRIC: CPT

## 2018-07-25 ENCOUNTER — TELEPHONE (OUTPATIENT)
Dept: FAMILY MEDICINE CLINIC | Facility: CLINIC | Age: 23
End: 2018-07-25

## 2018-07-25 DIAGNOSIS — R55 VASOVAGAL SYNCOPE: Primary | ICD-10-CM

## 2018-07-26 ENCOUNTER — INITIAL PRENATAL (OUTPATIENT)
Dept: OBSTETRICS AND GYNECOLOGY | Facility: CLINIC | Age: 23
End: 2018-07-26

## 2018-07-26 ENCOUNTER — APPOINTMENT (OUTPATIENT)
Dept: LAB | Facility: HOSPITAL | Age: 23
End: 2018-07-26

## 2018-07-26 ENCOUNTER — TELEPHONE (OUTPATIENT)
Dept: OBSTETRICS AND GYNECOLOGY | Facility: CLINIC | Age: 23
End: 2018-07-26

## 2018-07-26 VITALS — WEIGHT: 135 LBS | SYSTOLIC BLOOD PRESSURE: 120 MMHG | BODY MASS INDEX: 23.91 KG/M2 | DIASTOLIC BLOOD PRESSURE: 80 MMHG

## 2018-07-26 DIAGNOSIS — K21.9 GASTROESOPHAGEAL REFLUX DISEASE WITHOUT ESOPHAGITIS: ICD-10-CM

## 2018-07-26 DIAGNOSIS — Z3A.10 10 WEEKS GESTATION OF PREGNANCY: Primary | ICD-10-CM

## 2018-07-26 DIAGNOSIS — Z32.01 PREGNANCY EXAMINATION OR TEST, POSITIVE RESULT: ICD-10-CM

## 2018-07-26 DIAGNOSIS — D18.00 CAVERNOUS HEMANGIOMA: ICD-10-CM

## 2018-07-26 LAB
ABO GROUP BLD: NORMAL
ALBUMIN SERPL-MCNC: 4.3 G/DL (ref 3.4–4.8)
ALBUMIN/GLOB SERPL: 1.5 G/DL (ref 1.1–1.8)
ALP SERPL-CCNC: 48 U/L (ref 38–126)
ALT SERPL W P-5'-P-CCNC: 40 U/L (ref 9–52)
AMPHET+METHAMPHET UR QL: NEGATIVE
ANION GAP SERPL CALCULATED.3IONS-SCNC: 8 MMOL/L (ref 5–15)
AST SERPL-CCNC: 37 U/L (ref 14–36)
BARBITURATES UR QL SCN: NEGATIVE
BASOPHILS # BLD AUTO: 0.01 10*3/MM3 (ref 0–0.2)
BASOPHILS # BLD AUTO: 0.02 10*3/MM3 (ref 0–0.2)
BASOPHILS NFR BLD AUTO: 0.1 % (ref 0–2)
BASOPHILS NFR BLD AUTO: 0.2 % (ref 0–2)
BENZODIAZ UR QL SCN: NEGATIVE
BILIRUB SERPL-MCNC: 0.5 MG/DL (ref 0.2–1.3)
BILIRUB UR QL STRIP: NEGATIVE
BLD GP AB SCN SERPL QL: NEGATIVE
BUN BLD-MCNC: 11 MG/DL (ref 7–21)
BUN/CREAT SERPL: 17.7 (ref 7–25)
CALCIUM SPEC-SCNC: 9.2 MG/DL (ref 8.4–10.2)
CANNABINOIDS SERPL QL: NEGATIVE
CHLORIDE SERPL-SCNC: 105 MMOL/L (ref 95–110)
CLARITY UR: CLEAR
CO2 SERPL-SCNC: 22 MMOL/L (ref 22–31)
COCAINE UR QL: NEGATIVE
COLOR UR: YELLOW
CREAT BLD-MCNC: 0.62 MG/DL (ref 0.5–1)
DEPRECATED RDW RBC AUTO: 46.5 FL (ref 36.4–46.3)
DEPRECATED RDW RBC AUTO: 46.9 FL (ref 36.4–46.3)
EOSINOPHIL # BLD AUTO: 0.07 10*3/MM3 (ref 0–0.7)
EOSINOPHIL # BLD AUTO: 0.14 10*3/MM3 (ref 0–0.7)
EOSINOPHIL NFR BLD AUTO: 0.6 % (ref 0–7)
EOSINOPHIL NFR BLD AUTO: 1.3 % (ref 0–7)
ERYTHROCYTE [DISTWIDTH] IN BLOOD BY AUTOMATED COUNT: 13.9 % (ref 11.5–14.5)
ERYTHROCYTE [DISTWIDTH] IN BLOOD BY AUTOMATED COUNT: 14 % (ref 11.5–14.5)
GFR SERPL CREATININE-BSD FRML MDRD: 118 ML/MIN/1.73 (ref 71–165)
GLOBULIN UR ELPH-MCNC: 2.8 GM/DL (ref 2.3–3.5)
GLUCOSE BLD-MCNC: 103 MG/DL (ref 60–100)
GLUCOSE UR STRIP-MCNC: NEGATIVE MG/DL
HCT VFR BLD AUTO: 37.7 % (ref 35–45)
HCT VFR BLD AUTO: 38.2 % (ref 35–45)
HGB BLD-MCNC: 12.8 G/DL (ref 12–15.5)
HGB BLD-MCNC: 12.9 G/DL (ref 12–15.5)
HGB UR QL STRIP.AUTO: NEGATIVE
IMM GRANULOCYTES # BLD: 0.03 10*3/MM3 (ref 0–0.02)
IMM GRANULOCYTES # BLD: 0.03 10*3/MM3 (ref 0–0.02)
IMM GRANULOCYTES NFR BLD: 0.3 % (ref 0–0.5)
IMM GRANULOCYTES NFR BLD: 0.3 % (ref 0–0.5)
IRON 24H UR-MRATE: 81 MCG/DL (ref 37–170)
KETONES UR QL STRIP: NEGATIVE
LEUKOCYTE ESTERASE UR QL STRIP.AUTO: NEGATIVE
LYMPHOCYTES # BLD AUTO: 1.55 10*3/MM3 (ref 0.6–4.2)
LYMPHOCYTES # BLD AUTO: 1.69 10*3/MM3 (ref 0.6–4.2)
LYMPHOCYTES NFR BLD AUTO: 14.1 % (ref 10–50)
LYMPHOCYTES NFR BLD AUTO: 15.8 % (ref 10–50)
Lab: NORMAL
MCH RBC QN AUTO: 31 PG (ref 26.5–34)
MCH RBC QN AUTO: 31.3 PG (ref 26.5–34)
MCHC RBC AUTO-ENTMCNC: 33.8 G/DL (ref 31.4–36)
MCHC RBC AUTO-ENTMCNC: 34 G/DL (ref 31.4–36)
MCV RBC AUTO: 91.8 FL (ref 80–98)
MCV RBC AUTO: 92.2 FL (ref 80–98)
METHADONE UR QL SCN: NEGATIVE
MONOCYTES # BLD AUTO: 0.44 10*3/MM3 (ref 0–0.9)
MONOCYTES # BLD AUTO: 0.49 10*3/MM3 (ref 0–0.9)
MONOCYTES NFR BLD AUTO: 4 % (ref 0–12)
MONOCYTES NFR BLD AUTO: 4.6 % (ref 0–12)
NEUTROPHILS # BLD AUTO: 8.3 10*3/MM3 (ref 2–8.6)
NEUTROPHILS # BLD AUTO: 8.9 10*3/MM3 (ref 2–8.6)
NEUTROPHILS NFR BLD AUTO: 77.8 % (ref 37–80)
NEUTROPHILS NFR BLD AUTO: 80.9 % (ref 37–80)
NITRITE UR QL STRIP: NEGATIVE
OPIATES UR QL: NEGATIVE
OXYCODONE UR QL SCN: NEGATIVE
PH UR STRIP.AUTO: 5.5 [PH] (ref 5–9)
PLATELET # BLD AUTO: 305 10*3/MM3 (ref 150–450)
PLATELET # BLD AUTO: 311 10*3/MM3 (ref 150–450)
PMV BLD AUTO: 10.7 FL (ref 8–12)
PMV BLD AUTO: 9.9 FL (ref 8–12)
POTASSIUM BLD-SCNC: 4.6 MMOL/L (ref 3.5–5.1)
PROT SERPL-MCNC: 7.1 G/DL (ref 6.3–8.6)
PROT UR QL STRIP: NEGATIVE
RBC # BLD AUTO: 4.09 10*6/MM3 (ref 3.77–5.16)
RBC # BLD AUTO: 4.16 10*6/MM3 (ref 3.77–5.16)
RH BLD: POSITIVE
SODIUM BLD-SCNC: 135 MMOL/L (ref 137–145)
SP GR UR STRIP: 1.01 (ref 1–1.03)
TSH SERPL DL<=0.05 MIU/L-ACNC: 1.15 MIU/ML (ref 0.46–4.68)
UROBILINOGEN UR QL STRIP: NORMAL
VIT B12 BLD-MCNC: 298 PG/ML (ref 239–931)
WBC NRBC COR # BLD: 10.67 10*3/MM3 (ref 3.2–9.8)
WBC NRBC COR # BLD: 11 10*3/MM3 (ref 3.2–9.8)

## 2018-07-26 PROCEDURE — 87086 URINE CULTURE/COLONY COUNT: CPT | Performed by: OBSTETRICS & GYNECOLOGY

## 2018-07-26 PROCEDURE — 80307 DRUG TEST PRSMV CHEM ANLYZR: CPT | Performed by: OBSTETRICS & GYNECOLOGY

## 2018-07-26 PROCEDURE — 86762 RUBELLA ANTIBODY: CPT | Performed by: OBSTETRICS & GYNECOLOGY

## 2018-07-26 PROCEDURE — 36415 COLL VENOUS BLD VENIPUNCTURE: CPT | Performed by: NURSE PRACTITIONER

## 2018-07-26 PROCEDURE — 81003 URINALYSIS AUTO W/O SCOPE: CPT | Performed by: OBSTETRICS & GYNECOLOGY

## 2018-07-26 PROCEDURE — 82607 VITAMIN B-12: CPT | Performed by: NURSE PRACTITIONER

## 2018-07-26 PROCEDURE — 86900 BLOOD TYPING SEROLOGIC ABO: CPT | Performed by: OBSTETRICS & GYNECOLOGY

## 2018-07-26 PROCEDURE — 87340 HEPATITIS B SURFACE AG IA: CPT | Performed by: OBSTETRICS & GYNECOLOGY

## 2018-07-26 PROCEDURE — 86850 RBC ANTIBODY SCREEN: CPT | Performed by: OBSTETRICS & GYNECOLOGY

## 2018-07-26 PROCEDURE — 83540 ASSAY OF IRON: CPT | Performed by: NURSE PRACTITIONER

## 2018-07-26 PROCEDURE — 99203 OFFICE O/P NEW LOW 30 MIN: CPT | Performed by: OBSTETRICS & GYNECOLOGY

## 2018-07-26 PROCEDURE — 85025 COMPLETE CBC W/AUTO DIFF WBC: CPT | Performed by: OBSTETRICS & GYNECOLOGY

## 2018-07-26 PROCEDURE — 80050 GENERAL HEALTH PANEL: CPT | Performed by: NURSE PRACTITIONER

## 2018-07-26 PROCEDURE — 86803 HEPATITIS C AB TEST: CPT | Performed by: OBSTETRICS & GYNECOLOGY

## 2018-07-26 PROCEDURE — 86901 BLOOD TYPING SEROLOGIC RH(D): CPT | Performed by: OBSTETRICS & GYNECOLOGY

## 2018-07-26 PROCEDURE — G0432 EIA HIV-1/HIV-2 SCREEN: HCPCS | Performed by: OBSTETRICS & GYNECOLOGY

## 2018-07-26 RX ORDER — FOLIC ACID 1 MG/1
1 TABLET ORAL DAILY
Qty: 90 TABLET | Refills: 3 | Status: SHIPPED | OUTPATIENT
Start: 2018-07-26 | End: 2018-11-20

## 2018-07-26 RX ORDER — OMEPRAZOLE 20 MG/1
20 TABLET, DELAYED RELEASE ORAL DAILY
Qty: 90 TABLET | Refills: 4 | Status: SHIPPED | OUTPATIENT
Start: 2018-07-26 | End: 2018-11-20

## 2018-07-26 NOTE — TELEPHONE ENCOUNTER
----- Message from Cathie Simms CNA sent at 7/26/2018  1:15 PM CDT -----  PT WAS SEEN TODAY FOR NEW OB AND SAID SHE FORGOT TO ASK ABOUT THE  TEST. SHE SAID SHE IS WANTING IT DONE IF SOMEONE COULD CALL HER BACK.  THANKS!

## 2018-07-27 LAB
BACTERIA SPEC AEROBE CULT: NORMAL
HBV SURFACE AG SERPL QL IA: NEGATIVE
HCV AB SER DONR QL: NEGATIVE
HIV1+2 AB SER QL: NEGATIVE
RUBV IGG SER QL: ABNORMAL
RUBV IGG SER-ACNC: 236 IU/ML (ref 0–9.9)

## 2018-07-27 NOTE — PROGRESS NOTES
Chief Complaint   Patient presents with   • High Risk Gestation       Marychuy Aburto is a 22 y.o. year old .  Patient's last menstrual period was 2018 (exact date).  She presents to be seen to initiate prenatal care.    She has a history of one previous miscarriage in .  She has a history of hemangiomas with 2 located and she has to and one located on the liver.    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    Smoking status: Never Smoker                                                              Smokeless tobacco: Never Used                          The following portions of the patient's history were reviewed and updated as appropriate:vital signs, allergies, current medications, past family history, past medical history, past social history, past surgical history and problem list.    Lab Review   No data reviewed    Imaging   Transvaginal ultrasound performed 2018 shows single intrauterine pregnancy consistent with 5 weeks and 5 days with fetal heart rate 99 bpm and NICKIE by ultrasound 2019.    Limited ultrasound performed 2018 because of inability to hear heart tones shows single intrauterine pregnancy with fetal heart rate 166 bpm and crown-rump length consistent with 10 weeks and 6 days.    Assessment/Plan   ASSESSMENT  1. IUP at 10w6d  Marychuy was seen today for high risk gestation.    Diagnoses and all orders for this visit:    10 weeks gestation of pregnancy    Cavernous hemangioma mediastinum    Gastroesophageal reflux disease without esophagitis    Pregnancy examination or test, positive result  -     OB Panel With HIV  -     Urine Culture - Urine, Urine, Clean Catch  -     Urine Drug Screen - Urine, Clean Catch  -     Cancel: US Ob Transvaginal; Future  -     Urinalysis With Culture If Indicated - Urine, Clean Catch; Future  -     Urinalysis With Culture If Indicated - Urine, Clean Catch  -     RPR  -     CBC Auto Differential  -     Hepatitis B Surface Antigen  -      Rubella Antibody, IgG  -     OB Panel Type & Screen  -     HIV-1 & HIV-2 Antibodies  -     Hepatitis C Antibody  -     PREVIOUS HISTORY; Future  -     PREVIOUS HISTORY    Other orders  -     folic acid (FOLVITE) 1 MG tablet; Take 1 tablet by mouth Daily.  -     omeprazole OTC (PRILOSEC OTC) 20 MG EC tablet; Take 1 tablet by mouth Daily.    2.     PLAN  1. Tests ordered today:  Orders Placed This Encounter   Procedures   • Urine Culture - Urine, Urine, Clean Catch   • OB Panel With HIV   • Urine Drug Screen - Urine, Clean Catch   • Urinalysis With Culture If Indicated - Urine, Clean Catch     Standing Status:   Future     Number of Occurrences:   1     Standing Expiration Date:   7/26/2019   • RPR   • CBC Auto Differential   • Hepatitis B Surface Antigen   • Rubella Antibody, IgG   • HIV-1 & HIV-2 Antibodies   • Hepatitis C Antibody   • OB Panel Type & Screen   • PREVIOUS HISTORY     Standing Status:   Future     Number of Occurrences:   1     Standing Expiration Date:   7/26/2019     2. Medications prescribed today:  New Medications Ordered This Visit   Medications   • folic acid (FOLVITE) 1 MG tablet     Sig: Take 1 tablet by mouth Daily.     Dispense:  90 tablet     Refill:  3   • omeprazole OTC (PRILOSEC OTC) 20 MG EC tablet     Sig: Take 1 tablet by mouth Daily.     Dispense:  90 tablet     Refill:  4       Follow up: 4 week(s)       This note was electronically signed.    Favio Sims MD  July 27, 2018

## 2018-07-28 LAB — RPR SER QL: NORMAL

## 2018-08-01 ENCOUNTER — TELEPHONE (OUTPATIENT)
Dept: FAMILY MEDICINE CLINIC | Facility: CLINIC | Age: 23
End: 2018-08-01

## 2018-08-01 NOTE — TELEPHONE ENCOUNTER
-Patient notified with results.---- Message from DEYA Paul sent at 7/26/2018 11:08 AM CDT -----  Can you let her know labs are normal

## 2018-08-23 ENCOUNTER — ROUTINE PRENATAL (OUTPATIENT)
Dept: OBSTETRICS AND GYNECOLOGY | Facility: CLINIC | Age: 23
End: 2018-08-23

## 2018-08-23 VITALS — DIASTOLIC BLOOD PRESSURE: 72 MMHG | SYSTOLIC BLOOD PRESSURE: 127 MMHG | WEIGHT: 137 LBS | BODY MASS INDEX: 24.27 KG/M2

## 2018-08-23 DIAGNOSIS — K21.9 GASTROESOPHAGEAL REFLUX DISEASE WITHOUT ESOPHAGITIS: ICD-10-CM

## 2018-08-23 DIAGNOSIS — Z3A.14 14 WEEKS GESTATION OF PREGNANCY: Primary | ICD-10-CM

## 2018-08-23 DIAGNOSIS — D18.00 CAVERNOUS HEMANGIOMA: ICD-10-CM

## 2018-08-23 PROCEDURE — 99213 OFFICE O/P EST LOW 20 MIN: CPT | Performed by: OBSTETRICS & GYNECOLOGY

## 2018-08-23 NOTE — PROGRESS NOTES
Chief Complaint   Patient presents with   • High Risk Gestation     Marychuy Aburto is a 22 y.o. year old .  Patient's last menstrual period was 2018 (exact date).  She presents to be seen to initiate prenatal care.     She has a history of one previous miscarriage in .  She has a history of hemangiomas with 2 located and she has to and one located on the liver.    She lives in [place].     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     Smoking status: Never Smoker                                                               Smokeless tobacco: Never Used                            The following portions of the patient's history were reviewed and updated as appropriate:vital signs, allergies, current medications, past family history, past medical history, past social history, past surgical history and problem list.     Lab Review   No data reviewed     Imaging   Transvaginal ultrasound performed 2018 shows single intrauterine pregnancy consistent with 5 weeks and 5 days with fetal heart rate 99 bpm and NICKIE by ultrasound 2019.     Limited ultrasound performed 2018 because of inability to hear heart tones shows single intrauterine pregnancy with fetal heart rate 166 bpm and crown-rump length consistent with 10 weeks and 6 days.    GERD is much improved.    She is having a boy named Deanna.    She declines genetic testing.      The patient complains of the following: No complaints    ROS  Vaginal bleeding: No   Nausea: No   Diarrhea: No   Constipation: No   Other:      Lab Results   Component Value Date    ABO O 2018    RH Positive 2018    ABSCRN Negative 2018       Specific topics discussed at today's visit:genetic testing which she declines  Tests done today: none  Tests to be done at the next visit: lisa Perrin was seen today for high risk gestation.    Diagnoses and all orders for this visit:    14 weeks gestation of pregnancy    Cavernous hemangioma  mediastinum    Gastroesophageal reflux disease without esophagitis

## 2018-09-11 ENCOUNTER — ROUTINE PRENATAL (OUTPATIENT)
Dept: OBSTETRICS AND GYNECOLOGY | Facility: CLINIC | Age: 23
End: 2018-09-11

## 2018-09-11 VITALS — DIASTOLIC BLOOD PRESSURE: 72 MMHG | WEIGHT: 138 LBS | SYSTOLIC BLOOD PRESSURE: 119 MMHG | BODY MASS INDEX: 24.45 KG/M2

## 2018-09-11 DIAGNOSIS — Z3A.17 17 WEEKS GESTATION OF PREGNANCY: Primary | ICD-10-CM

## 2018-09-11 DIAGNOSIS — D18.00 CAVERNOUS HEMANGIOMA: ICD-10-CM

## 2018-09-11 DIAGNOSIS — K21.9 GASTROESOPHAGEAL REFLUX DISEASE WITHOUT ESOPHAGITIS: ICD-10-CM

## 2018-09-11 DIAGNOSIS — Z36.3 ANTENATAL SCREENING FOR MALFORMATION USING ULTRASONICS: ICD-10-CM

## 2018-09-11 PROCEDURE — 99213 OFFICE O/P EST LOW 20 MIN: CPT | Performed by: OBSTETRICS & GYNECOLOGY

## 2018-09-12 NOTE — PROGRESS NOTES
Chief Complaint   Patient presents with   • High Risk Gestation     Marychuy Aburto is a 22 y.o. year old .  Patient's last menstrual period was 2018 (exact date).  She presents to be seen to initiate prenatal care.     She has a history of one previous miscarriage in .  She has a history of hemangiomas with 2 located and she has to and one located on the liver.     She lives in [place].     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     Smoking status: Never Smoker                                                               Smokeless tobacco: Never Used                            The following portions of the patient's history were reviewed and updated as appropriate:vital signs, allergies, current medications, past family history, past medical history, past social history, past surgical history and problem list.     Lab Review   No data reviewed     Imaging   Transvaginal ultrasound performed 2018 shows single intrauterine pregnancy consistent with 5 weeks and 5 days with fetal heart rate 99 bpm and NICKIE by ultrasound 2019.     Limited ultrasound performed 2018 because of inability to hear heart tones shows single intrauterine pregnancy with fetal heart rate 166 bpm and crown-rump length consistent with 10 weeks and 6 days.     GERD is much improved.     She is having a boy named Deanna.     She declines genetic testing.    The patient complains of the following: No new complaints    ROS  Vaginal bleeding: No   Nausea: No   Diarrhea: No   Constipation: No   Other:      Lab Results   Component Value Date    ABO O 2018    RH Positive 2018    ABSCRN Negative 2018       Specific topics discussed at today's visit: Anatomy ultrasound  Tests done today: none  Tests to be done at the next visit: Anatomy ultrasound     Marychuy was seen today for high risk gestation.    Diagnoses and all orders for this visit:    17 weeks gestation of pregnancy    Cavernous hemangioma  mediastinum    Gastroesophageal reflux disease without esophagitis     screening for malformation using ultrasonics  -     US Ob 14 + Weeks Single or First Gestation

## 2018-10-09 ENCOUNTER — ROUTINE PRENATAL (OUTPATIENT)
Dept: OBSTETRICS AND GYNECOLOGY | Facility: CLINIC | Age: 23
End: 2018-10-09

## 2018-10-09 VITALS — BODY MASS INDEX: 25.15 KG/M2 | SYSTOLIC BLOOD PRESSURE: 102 MMHG | WEIGHT: 142 LBS | DIASTOLIC BLOOD PRESSURE: 64 MMHG

## 2018-10-09 DIAGNOSIS — D18.00 CAVERNOUS HEMANGIOMA: ICD-10-CM

## 2018-10-09 DIAGNOSIS — Z3A.21 21 WEEKS GESTATION OF PREGNANCY: Primary | ICD-10-CM

## 2018-10-09 DIAGNOSIS — M54.32 SCIATICA OF LEFT SIDE: ICD-10-CM

## 2018-10-09 PROCEDURE — 99213 OFFICE O/P EST LOW 20 MIN: CPT | Performed by: OBSTETRICS & GYNECOLOGY

## 2018-10-11 DIAGNOSIS — O26.892 PELVIC PAIN AFFECTING PREGNANCY IN SECOND TRIMESTER, ANTEPARTUM: Primary | ICD-10-CM

## 2018-10-11 DIAGNOSIS — R10.2 PELVIC PAIN AFFECTING PREGNANCY IN SECOND TRIMESTER, ANTEPARTUM: Primary | ICD-10-CM

## 2018-10-14 NOTE — PROGRESS NOTES
Chief Complaint   Patient presents with   • High Risk Gestation     Marychuy Aburto is a 22 y.o. year old .  Patient's last menstrual period was 2018 (exact date).  She presents to be seen to initiate prenatal care.     She has a history of one previous miscarriage in .  She has a history of hemangiomas.  One is on the liver.     She lives in [place].     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     Smoking status: Never Smoker                                                               Smokeless tobacco: Never Used                            The following portions of the patient's history were reviewed and updated as appropriate:vital signs, allergies, current medications, past family history, past medical history, past social history, past surgical history and problem list.     Lab Review   No data reviewed     Imaging   Transvaginal ultrasound performed 2018 shows single intrauterine pregnancy consistent with 5 weeks and 5 days with fetal heart rate 99 bpm and NICKIE by ultrasound 2019.     Limited ultrasound performed 2018 because of inability to hear heart tones shows single intrauterine pregnancy with fetal heart rate 166 bpm and crown-rump length consistent with 10 weeks and 6 days.     GERD is much improved.     She is having a boy named Deanna.     She declines genetic testing.    Ultrasound 2018 shows single intrauterine pregnancy consistent with 21 weeks and 4 days.  No abnormalities noted.  Three-vessel umbilical cord.  Fetal heart rate 149 bpm.  Cervix 4.4 cm long.    The patient complains of the following: Left sciatica.  We'll schedule physical therapy with Loan.    MARCIE  Vaginal bleeding: No   Nausea: No   Diarrhea: No   Constipation: No   Other:      Lab Results   Component Value Date    ABO O 2018    RH Positive 2018    ABSCRN Negative 2018       Specific topics discussed at today's visit: Treatment for her sciatica.  Ultrasound  results  Tests done today: U/S for anatomic screening - anatomy completely seen today  Tests to be done at the next visit: none    Marychuy was seen today for high risk gestation.    Diagnoses and all orders for this visit:    21 weeks gestation of pregnancy    Cavernous hemangioma mediastinum    Sciatica of left side

## 2018-10-23 ENCOUNTER — HOSPITAL ENCOUNTER (OUTPATIENT)
Dept: PHYSICAL THERAPY | Facility: HOSPITAL | Age: 23
Setting detail: THERAPIES SERIES
Discharge: HOME | End: 2018-10-23

## 2018-10-23 DIAGNOSIS — O99.891 BACK PAIN IN PREGNANCY: ICD-10-CM

## 2018-10-23 DIAGNOSIS — R10.2 PELVIC PAIN IN PREGNANCY: Primary | ICD-10-CM

## 2018-10-23 DIAGNOSIS — M54.9 BACK PAIN IN PREGNANCY: ICD-10-CM

## 2018-10-23 DIAGNOSIS — O26.899 PELVIC PAIN IN PREGNANCY: Primary | ICD-10-CM

## 2018-10-23 PROCEDURE — 97162 PT EVAL MOD COMPLEX 30 MIN: CPT | Performed by: PHYSICAL THERAPIST

## 2018-10-23 NOTE — THERAPY EVALUATION
Outpatient Physical Therapy Women's Health Initial Evaluation  [facility]     Patient Name: Marychuy Aburto  : 1995  MRN: [id number]  Today's Date: 10/23/2018        Visit Date: 10/23/2018  Visit number:   Recheck: 18  Insurance: pending authorization    Patient Active Problem List   Diagnosis   • Insomnia   • Anxiety   • Depression   • Malaise   • Menometrorrhagia   • Surveillance of contraceptive pill   • General medical exam   • Epigastric pain   • Cavernous hemangioma mediastinum   • Other acne   • Pelvic pain   • Positive pregnancy test   • Gastroesophageal reflux disease without esophagitis   • Sciatica of left side        Past Medical History:   Diagnosis Date   • Acute sinusitis    • Agoraphobia with panic attacks    • Chronic depression    • Corneal abrasion    • Dysfunction of eustachian tube    • Encounter for general counseling on prescription of oral contraceptives    • Generalized anxiety disorder    • Hemangioma     mediastinal cavernous [id number] stable      • Herpes simplex    • Iron deficiency anemia    • Laryngitis    • Malaise and fatigue    • Moderate Vaginal discharge     reports frequent yeast infections      • Otitis media    • Ovarian cyst    • Streptococcal pharyngitis    • Syncope    • Tracheitis    • Verruca plantaris         Past Surgical History:   Procedure Laterality Date   • KNEE ARTHROSCOPY  2009    x2   • MEDIASTINOSCOPY  2011    Mediastinoscopy. Cavernous hemangioma. Right paratracheal mass.   • WISDOM TOOTH EXTRACTION           Visit Dx:    ICD-10-CM ICD-9-CM   1. Pelvic pain in pregnancy O26.899 646.80    R10.2 625.9   2. Back pain in pregnancy O99.89 646.80    M54.9 724.5                 Women's Health     Row Name 10/23/18 1600             Pregnancy Questions    Number of Pregnancies 2  -SW      Number of Children 0  -SW      How many weeks pregnant are you? 23 weeks  -SW      Due Date 19  -SW      Do you have radicular pain or numbness?  No  -SW        User Key  (r) = Recorded By, (t) = Taken By, (c) = Cosigned By    Initials Name Provider Type    SW Loan Valderrama, PT Physical Therapist              PT Ortho     Row Name 10/23/18 1600       Subjective Comments    Subjective Comments Pelvic pain never prior to pregnancy.  Onset started around 7-10 weeks.  Started chiropractor but noted that pain has not improved any longer than a couple hours to days.  In afternoons, I can barely walk.  Fetus is incredibly low in pelvis.  Placenta was anterior in beginning but it has not been reported as being the same as before.  When the baby is moving it feels like the baby is low and able to wave through my vagina.  Lower back pain is felt as well.  Sciatic nerve with pain started on L side and radiates across back and nothing radicular.  Noted pain for at least last 3 months.  Experienced every day.  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  Reports that she feels better with extended spine but with attempts of standing upright, it increases her pain.   -SW       Subjective Pain    Able to rate subjective pain? yes  -SW    Pre-Treatment Pain Level 6  -SW    Post-Treatment Pain Level 6  -SW    Subjective Pain Comment intensity increases in evening 10/10.  -SW       Posture/Observations    Posture- WNL Posture is WNL  -SW    Posture/Observations Comments Upright posture.  Gait unremarkable. No toe drag noted. Standing alignment level bilateral shoulders and crests.  Supine position ant rotated innom on L side.  sacrum good alignment. Patient presents with flip flops as primary shoe choice.   -SW       Quarter Clearing    Quarter Clearing Lower Quarter Clearing  -SW       DTR- Lower Quarter Clearing    Patellar tendon (L2-4) 2- Normal response  -SW    Achilles tendon (S1-2) 2- Normal response  -SW       Neural Tension Signs- Lower Quarter Clearing    Slump Negative  -SW    SLR Negative  -SW    Prone knee flexion Negative  -SW        Sensory Screen for Light Touch- Lower Quarter Clearing    L1 (inguinal area) Intact  -SW    L2 (anterior mid thigh) Intact  -SW    L3 (distal anterior thigh) Intact  -SW    L4 (medial lower leg/foot) Intact  -SW    L5 (lateral lower leg/great toe) Intact  -SW    S1 (bottom of foot) Intact  -SW       Myotomal Screen- Lower Quarter Clearing    Hip flexion (L2) 5 (Normal)  -SW    Knee extension (L3) 5 (Normal)  -SW    Ankle DF (L4) 5 (Normal)  -SW    Great toe extension (L5) WNL  -SW    Ankle PF (S1) 5 (Normal)  -SW    Knee flexion (S2) 5 (Normal)  -SW       Lumbar ROM Screen- Lower Quarter Clearing    Lumbar Flexion --   50% reduced flexion  -SW    Lumbar Extension Normal  -SW    Lumbar Lateral Flexion Normal  -SW    Lumbar Rotation Normal  -SW       SI/Hip Screen- Lower Quarter Clearing    ASIS compression Positive  -SW    ASIS distraction Negative  -SW    Michelle's/Austin's test Left:;Positive  -SW    Posterior thigh sheer Negative  -SW    Pain in Senia's area Bilateral:;Positive  -SW       Special Tests/Palpation    Special Tests/Palpation Lumbar/SI  -SW       Lumbosacral Accessory Motions    Lumbosacral Accessory Motions Tested? Yes   all segments with good AP mobility   -SW    PA glide- Sacral base --   level and symmetrical   -SW    Innominate rotation --   ant rotated innom on L side.   -SW       Lumbosacral Palpation    Lumbosacral Palpation? No Tenderness/Abnormality  -SW       General ROM    GENERAL ROM COMMENTS functional ROM all planes of UE and LE.   -      User Key  (r) = Recorded By, (t) = Taken By, (c) = Cosigned By    Initials Name Provider Type    Loan Friedman PT Physical Therapist                           PT Assessment/Plan     Row Name 10/23/18 1800          PT Assessment    Functional Limitations Performance in leisure activities;Performance in self-care ADL;Performance in work activities;Performance in sport activities  -SW     Impairments Impaired muscle length;Impaired  muscle power;Impaired postural alignment;Joint mobility;Muscle strength;Pain;Poor body mechanics;Posture  -SW     Assessment Comments Patient is a 23 yo female presenting to the clinic with musculoskeletal complaints.  She presents with ant rotated pelvic innominate on the L side with dec recruitment of stabilizers with general activity.  She has had a history of instability to pelvic girdle during treatment for her knees post arthroscopic procedures.  She is  and has some apprehension with positioning and muscle recruitment as she does not want to jeopardize her fetus due to her history with miscarriage. Patient is a good candidate for skilled therapy intervention to address fears, provide HEP appropriate for her condition, and help in the management and treatment of her condition.    -SW     Rehab Potential Good  -SW     Patient/caregiver participated in establishment of treatment plan and goals Yes  -SW     Patient would benefit from skilled therapy intervention Yes  -SW        PT Plan    PT Frequency 1x/week  -SW     Predicted Duration of Therapy Intervention (Therapy Eval) 8-12 visits  -SW     PT Plan Comments Manual therapy for alignment/tissue release etc.  Ther ex for stretching and stabilization. Postural education/body mechanics as needed.    -SW       User Key  (r) = Recorded By, (t) = Taken By, (c) = Cosigned By    Initials Name Provider Type    Loan Friedman, PT Physical Therapist                  Exercises     Row Name 10/23/18 1600             Subjective Comments    Subjective Comments Pelvic pain never prior to pregnancy.  Onset started around 7-10 weeks.  Started chiropractor but noted that pain has not improved any longer than a couple hours to days.  In afternoons, I can barely walk.  Fetus is incredibly low in pelvis.  Placenta was anterior in beginning but it has not been reported as being the same as before.  When the baby is moving it feels like the baby is low and able to wave  through my vagina.  Lower back pain is felt as well.  Sciatic nerve with pain started on L side and radiates across back and nothing radicular.  Noted pain for at least last 3 months.  Experienced every day.  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  Reports that she feels better with extended spine but with attempts of standing upright, it increases her pain.   -         Subjective Pain    Able to rate subjective pain? yes  -SW      Pre-Treatment Pain Level 6  -SW      Post-Treatment Pain Level 6  -SW      Subjective Pain Comment intensity increases in evening 10/10.  -SW         Exercise 1    Exercise Name 1 Education on body mechanics with neutral spine  -SW      Additional Comments Discussed options for school, sleep and home activities using neutral spine mechanics.   -SW         Exercise 2    Exercise Name 2 Isometric TA  -SW      Additional Comments educated for use of inc stability to uterus for t/f and dynamic mobility activities.  -SW         Exercise 3    Exercise Name 3 angry cat stretch   -SW      Reps 3 10  -SW      Time 3 5 sec  -SW         Exercise 4    Exercise Name 4 piriformis stretch standing  -SW      Reps 4 3  -SW      Time 4 30 sec  -SW         Exercise 5    Exercise Name 5 hamstring stretch   -SW      Reps 5 3  -SW      Time 5 30 sec  -SW         Exercise 6    Exercise Name 6 discussed appropriate shoe support for prolonged walking activities.   -        User Key  (r) = Recorded By, (t) = Taken By, (c) = Cosigned By    Initials Name Provider Type    Loan Friedman, PT Physical Therapist                            PT OP Goals     Row Name 10/23/18 1800          PT Short Term Goals    STG Date to Achieve 11/23/18  -     STG 1 patient to be independent in [facility] for stretching and mobility exercises.  -     STG 1 Progress New  -     STG 2 patient to present with improved alignment to spine in supine position to allow no necessity of MET for  alignment.  -     STG 2 Progress New  -[facility] 3 patient to present with inc tolerance to gait activities with school class using appropriate shoe attire and pain no greater than 2/10 with gait.   -[facility] 3 Progress New  -[facility] 4 patient to demo proper transfers with log roll and neutral spine mechanics without verbal cues required.  -[facility] 4 Progress New  [facility] 5 patient to present with improved pain post end of day to mild/mod vs severe 10/10 reported with evaluation   -[facility] 5 Progress New  [facility]        Long Term Goals    LTG Date to Achieve 02/15/19  -     LTG 1 subjectively improve 75% overall with activity and function  -     LTG 1 Progress New  -     LTG 2 patient to continue work activities without restrictions or missed days of work due to pain  -     LTG 2 Progress New  -     LTG 3 neg provacative tests for SI dysfunction  -     LTG 3 Progress New  -     LTG 4 pain to rate as low-mild post activity vs severe after days activity.  -     LTG 4 Progress New  [facility]        Time Calculation    PT Goal Re-Cert Due Date 11/23/18  -       User Key  (r) = Recorded By, (t) = Taken By, (c) = Cosigned By    Initials Name Provider Type    Loan Friedman, PT Physical Therapist          Therapy Education  Given: HEP, Symptoms/condition management, Posture/body mechanics  Program: New  How Provided: Verbal, Demonstration, Written  Provided to: Patient  Level of Understanding: Teach back education performed, Demonstrated, Verbalized               Time Calculation:   Start Time: 1610  Stop Time: 1720  Time Calculation (min): 70 min  Therapy Suggested Charges     Code   Minutes Charges    None           Therapy Charges for Today     Code Description Service Date Service Provider Modifiers Qty    52310548997 HC PT THER SUPP EA 15 MIN 10/23/2018 Loan Valderrama, PT GP 2    [id number] HC PT EVAL MOD COMPLEXITY 4 10/23/2018 Loan Valderrama, PT GP 1                   Loan Valderrama, PT  10/23/2018

## 2018-11-06 ENCOUNTER — ROUTINE PRENATAL (OUTPATIENT)
Dept: OBSTETRICS AND GYNECOLOGY | Facility: CLINIC | Age: 23
End: 2018-11-06

## 2018-11-06 VITALS — SYSTOLIC BLOOD PRESSURE: 102 MMHG | WEIGHT: 149 LBS | DIASTOLIC BLOOD PRESSURE: 70 MMHG | BODY MASS INDEX: 26.39 KG/M2

## 2018-11-06 DIAGNOSIS — M54.42 ACUTE LEFT-SIDED LOW BACK PAIN WITH LEFT-SIDED SCIATICA: Primary | ICD-10-CM

## 2018-11-06 DIAGNOSIS — Z36.89 ENCOUNTER FOR OTHER SPECIFIED ANTENATAL SCREENING: ICD-10-CM

## 2018-11-06 DIAGNOSIS — Z3A.25 25 WEEKS GESTATION OF PREGNANCY: ICD-10-CM

## 2018-11-06 PROCEDURE — 99213 OFFICE O/P EST LOW 20 MIN: CPT | Performed by: NURSE PRACTITIONER

## 2018-11-07 PROBLEM — M54.42 ACUTE LEFT-SIDED LOW BACK PAIN WITH LEFT-SIDED SCIATICA: Status: ACTIVE | Noted: 2018-11-07

## 2018-11-07 NOTE — PROGRESS NOTES
Marychuy Aburto is a 22 y.o. year old .  Patient's last menstrual period was 2018 (exact date).      She has a history of one previous miscarriage in .  She has a history of hemangiomas.  One is on the liver.     She lives in [place]. 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   Smoking status: Never Smoker                                                             Smokeless tobacco: Never Used                            Imaging   Transvaginal ultrasound performed 2018 shows single intrauterine pregnancy consistent with 5 weeks and 5 days with fetal heart rate 99 bpm and NICKIE by ultrasound 2019.     Limited ultrasound performed 2018 because of inability to hear heart tones shows single intrauterine pregnancy with fetal heart rate 166 bpm and crown-rump length consistent with 10 weeks and 6 days.     GERD is much improved.     She is having a boy named Deanna.     She declines genetic testing.     Ultrasound 2018 shows single intrauterine pregnancy consistent with 21 weeks and 4 days.  No abnormalities noted.  Three-vessel umbilical cord.  Fetal heart rate 149 bpm.  Cervix 4.4 cm long.     The patient complains of the following: Left sciatica.  We'll schedule physical therapy with Loan.    2018- MONICA visit today; feeling well, no complaints. Has had one appt with [facility] for PT and states that she is doing her recommended stretches at home. She is still having some sciatic pain on the left side but is managing that better now. Still taking prilosec for heartburn and feels that it is well controlled. Denies dysuria, vb, LOF, constipation or headaches. Needs 1 hour glucose test in 3 weeks; drink and instructions given. F/U for MONICA visit in 3 weeks or sooner PRN.

## 2018-11-12 ENCOUNTER — HOSPITAL ENCOUNTER (OUTPATIENT)
Dept: PHYSICAL THERAPY | Facility: HOSPITAL | Age: 23
Setting detail: THERAPIES SERIES
Discharge: HOME | End: 2018-11-12

## 2018-11-12 DIAGNOSIS — R10.2 PELVIC PAIN IN PREGNANCY: Primary | ICD-10-CM

## 2018-11-12 DIAGNOSIS — O99.891 BACK PAIN IN PREGNANCY: ICD-10-CM

## 2018-11-12 DIAGNOSIS — O26.899 PELVIC PAIN IN PREGNANCY: Primary | ICD-10-CM

## 2018-11-12 DIAGNOSIS — M54.9 BACK PAIN IN PREGNANCY: ICD-10-CM

## 2018-11-12 PROCEDURE — 97110 THERAPEUTIC EXERCISES: CPT | Performed by: PHYSICAL THERAPIST

## 2018-11-12 NOTE — THERAPY TREATMENT NOTE
Outpatient Physical Therapy Women's Health Treatment Note  [facility]     Patient Name: Marychuy Aburto  : 1995  MRN: [id number]  Today's Date: 2018        Visit Date: 2018  Visit Number:   Recheck: 18  Insurance: 12 visits (18)    Visit Dx:    ICD-10-CM ICD-9-CM   1. Pelvic pain in pregnancy O26.899 646.80    R10.2 625.9   2. Back pain in pregnancy O99.89 646.80    M54.9 724.5       Patient Active Problem List   Diagnosis   • Insomnia   • Anxiety   • Depression   • Malaise   • Menometrorrhagia   • Surveillance of contraceptive pill   • General medical exam   • Epigastric pain   • Cavernous hemangioma mediastinum   • Other acne   • Pelvic pain   • Positive pregnancy test   • Gastroesophageal reflux disease without esophagitis   • Sciatica of left side   • Acute left-sided low back pain with left-sided sciatica        Women's Health     Row Name 18 1600             Pregnancy Questions    Number of Pregnancies  2  -SW      Number of Children  0  -SW      How many weeks pregnant are you?  26 weeks  -SW      Due Date  19  -SW      Do you have radicular pain or numbness?  No  -SW        User Key  (r) = Recorded By, (t) = Taken By, (c) = Cosigned By    Initials Name Provider Type    Loan Friedman, PT Physical Therapist        PT Ortho     Row Name 18 1600       Subjective Comments    Subjective Comments  For the most part doing well.   has really been helping me with alignment checks. Feels that fetus may have moved upward.  She has been trying to stay on top of exercises and watching posture and feels that has helped tremendously.  MD on the  with glucose testing. Feels that she needs intermittent training as she is managing well with program.    -SW       Subjective Pain    Able to rate subjective pain?  yes  -SW    Pre-Treatment Pain Level  3  -SW    Post-Treatment Pain Level  3  -SW    Subjective Pain Comment  Past weekend 3/10  highest pain rating.    -SW       Posture/Observations    Posture- WNL  Posture is WNL  -SW    Posture/Observations Comments  Good awareness of posture.  Alert and oriented.  Step and stride noted good with gait this date. Transfers without distress.   -SW       Quarter Clearing    Quarter Clearing  Lower Quarter Clearing  -SW       DTR- Lower Quarter Clearing    Patellar tendon (L2-4)  2- Normal response  -SW    Achilles tendon (S1-2)  2- Normal response  -SW       Neural Tension Signs- Lower Quarter Clearing    Slump  Negative  -SW    SLR  Negative  -SW    Prone knee flexion  Negative  -SW       Sensory Screen for Light Touch- Lower Quarter Clearing    L1 (inguinal area)  Intact  -SW    L2 (anterior mid thigh)  Intact  -SW    L3 (distal anterior thigh)  Intact  -SW    L4 (medial lower leg/foot)  Intact  -SW    L5 (lateral lower leg/great toe)  Intact  -SW    S1 (bottom of foot)  Intact  -SW       Myotomal Screen- Lower Quarter Clearing    Hip flexion (L2)  5 (Normal)  -SW    Knee extension (L3)  5 (Normal)  -SW    Ankle DF (L4)  5 (Normal)  -SW    Great toe extension (L5)  WNL  -SW    Ankle PF (S1)  5 (Normal)  -SW    Knee flexion (S2)  5 (Normal)  -SW       Lumbar ROM Screen- Lower Quarter Clearing    Lumbar Flexion  -- 50% reduced flexion  -SW    Lumbar Extension  Normal  -SW    Lumbar Lateral Flexion  Normal  -SW    Lumbar Rotation  Normal  -SW       SI/Hip Screen- Lower Quarter Clearing    ASIS compression  Positive  -SW    ASIS distraction  Negative  -SW    Michelle's/Austin's test  Negative  -SW    Posterior thigh sheer  Negative  -SW    Pain in Senia's area  Bilateral:;Positive  -SW       Special Tests/Palpation    Special Tests/Palpation  Lumbar/SI  -SW       Lumbosacral Accessory Motions    Lumbosacral Accessory Motions Tested?  Yes all segments with good AP mobility   -SW    PA glide- Sacral base  -- level and symmetrical   -SW    Innominate rotation  -- level and symmetrical this date.   -SW        Lumbosacral Palpation    Lumbosacral Palpation?  No Tenderness/Abnormality  -      User Key  (r) = Recorded By, (t) = Taken By, (c) = Cosigned By    Initials Name Provider Type    Loan Friedman PT Physical Therapist                     PT Assessment/Plan     Row Name 11/12/18 1600          PT Assessment    Functional Limitations  Performance in leisure activities;Performance in self-care ADL;Performance in work activities;Performance in sport activities  -     Impairments  Impaired muscle length;Impaired muscle power;Impaired postural alignment;Joint mobility;Muscle strength;Pain;Poor body mechanics;Posture  -     Assessment Comments  Patient is managing very well.  She demonstrates good understanding of recommendations.  She is compliant with exercise routine.  Alignment level this date.  Patient desires intermittent follow up as she feels she is managing well.  will monitor symptoms.   -     Rehab Potential  Good  -     Patient/caregiver participated in establishment of treatment plan and goals  Yes  -     Patient would benefit from skilled therapy intervention  Yes  -        PT Plan    PT Frequency  1x/week  -     Predicted Duration of Therapy Intervention (Therapy Eval)  8-12 visits  -     PT Plan Comments  continue with stabilization program.  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   -       User Key  (r) = Recorded By, (t) = Taken By, (c) = Cosigned By    Initials Name Provider Type    Loan Friedman, PT Physical Therapist                Exercises     Row Name 11/12/18 1600             Subjective Comments    Subjective Comments  For the most part doing well.   has really been helping me with alignment checks. Feels that fetus may have moved upward.  She has been trying to stay on top of exercises and watching posture and feels that has helped tremendously.  MD on the 11/27 with glucose testing. Feels that she needs intermittent training as she is managing well  with program.    -         Subjective Pain    Able to rate subjective pain?  yes  -SW      Pre-Treatment Pain Level  3  -SW      Post-Treatment Pain Level  3  -      Subjective Pain Comment  Past weekend 3/10 highest pain rating.    -SW         Exercise 1    Exercise Name 1  Hamstring stretch bilaterally seated  -SW      Reps 1  3  -SW      Time 1  30 sec  -SW         Exercise 2    Exercise Name 2  Adductor stretch bilaterally  -SW      Reps 2  3  -SW      Time 2  30 sec  -SW         Exercise 3    Exercise Name 3  piriformis stretch   -SW      Reps 3  3  -SW      Time 3  30 sec  -SW         Exercise 4    Exercise Name 4  angry cat stretch   -SW      Reps 4  10  -SW      Time 4  5 sec  -SW         Exercise 5    Exercise Name 5  prone multifidus activation  -SW      Reps 5  10  -SW         Exercise 6    Exercise Name 6  multifidus activation with prone TKE  -SW      Reps 6  10  -SW         Exercise 7    Exercise Name 7  multifidus activation with arm raises  -SW      Reps 7  5  -SW        User Key  (r) = Recorded By, (t) = Taken By, (c) = Cosigned By    Initials Name Provider Type    Loan Friedman, PT Physical Therapist                      PT OP Goals     Row Name 11/12/18 1600          PT Short Term Goals    STG Date to Achieve  11/23/18  -     STG 1  patient to be independent in [facility] for stretching and mobility exercises.  -     STG 1 Progress  Ongoing;Progressing  -     STG 2  patient to present with improved alignment to spine in supine position to allow no necessity of MET for alignment.  -     STG 2 Progress  Ongoing;Progressing  -     STG 3  patient to present with inc tolerance to gait activities with school class using appropriate shoe attire and pain no greater than 2/10 with gait.   -     STG 3 Progress  Ongoing;Progressing  -     STG 4  patient to demo proper transfers with log roll and neutral spine mechanics without verbal cues required.  -     STG 4 Progress   Ongoing;Progressing  -     STG 5  patient to present with improved pain post end of day to mild/mod vs severe 10/10 reported with evaluation   -     STG 5 Progress  Progressing  -        Long Term Goals    LTG Date to Achieve  02/15/19  -     LTG 1  subjectively improve 75% overall with activity and function  -     LTG 1 Progress  New  -     LTG 2  patient to continue work activities without restrictions or missed days of work due to pain  -     LTG 2 Progress  New  -     LTG 3  neg provacative tests for SI dysfunction  -     LTG 3 Progress  New  -     LTG 4  pain to rate as low-mild post activity vs severe after days activity.  -     LTG 4 Progress  New  -        Time Calculation    PT Goal Re-Cert Due Date  11/23/18  -       User Key  (r) = Recorded By, (t) = Taken By, (c) = Cosigned By    Initials Name Provider Type    Loan Friedman, PT Physical Therapist           Therapy Education  Given: HEP  Program: Progressed  How Provided: Verbal, Demonstration  Provided to: Patient  Level of Understanding: Teach back education performed, Verbalized, Demonstrated              Time Calculation:   Start Time: 1610  Stop Time: 1655  Time Calculation (min): 45 min  Therapy Suggested Charges     Code   Minutes Charges    None           Therapy Charges for Today     Code Description Service Date Service Provider Modifiers Qty    40619490028 HC PT THER PROC EA 15 MIN 11/12/2018 Loan Valderrama, PT GP 3                    Loan Valderrama, PT  11/12/2018

## 2018-11-27 ENCOUNTER — HOSPITAL ENCOUNTER (OUTPATIENT)
Dept: PHYSICAL THERAPY | Facility: HOSPITAL | Age: 23
Setting detail: THERAPIES SERIES
Discharge: HOME | End: 2018-11-27

## 2018-11-27 ENCOUNTER — ROUTINE PRENATAL (OUTPATIENT)
Dept: OBSTETRICS AND GYNECOLOGY | Facility: CLINIC | Age: 23
End: 2018-11-27

## 2018-11-27 ENCOUNTER — APPOINTMENT (OUTPATIENT)
Dept: LAB | Facility: HOSPITAL | Age: 23
End: 2018-11-27

## 2018-11-27 VITALS — DIASTOLIC BLOOD PRESSURE: 78 MMHG | WEIGHT: 151.8 LBS | BODY MASS INDEX: 26.06 KG/M2 | SYSTOLIC BLOOD PRESSURE: 115 MMHG

## 2018-11-27 DIAGNOSIS — M54.9 BACK PAIN IN PREGNANCY: ICD-10-CM

## 2018-11-27 DIAGNOSIS — Z3A.28 28 WEEKS GESTATION OF PREGNANCY: Primary | ICD-10-CM

## 2018-11-27 DIAGNOSIS — O99.810 ABNORMAL GLUCOSE IN PREGNANCY, ANTEPARTUM: ICD-10-CM

## 2018-11-27 DIAGNOSIS — O99.891 BACK PAIN IN PREGNANCY: ICD-10-CM

## 2018-11-27 DIAGNOSIS — Z03.79 SUSPECTED MATERNAL CONDITION NOT FOUND: ICD-10-CM

## 2018-11-27 DIAGNOSIS — O26.899 PELVIC PAIN IN PREGNANCY: Primary | ICD-10-CM

## 2018-11-27 DIAGNOSIS — O99.013 ANEMIA DURING PREGNANCY IN THIRD TRIMESTER: ICD-10-CM

## 2018-11-27 DIAGNOSIS — O99.810 ABNORMAL GLUCOSE TOLERANCE TEST IN PREGNANCY: ICD-10-CM

## 2018-11-27 DIAGNOSIS — R10.2 PELVIC PAIN IN PREGNANCY: Primary | ICD-10-CM

## 2018-11-27 LAB
BASOPHILS # BLD AUTO: 0.02 10*3/MM3 (ref 0–0.2)
BASOPHILS NFR BLD AUTO: 0.1 % (ref 0–2)
DEPRECATED RDW RBC AUTO: 44.5 FL (ref 36.4–46.3)
EOSINOPHIL # BLD AUTO: 0.12 10*3/MM3 (ref 0–0.7)
EOSINOPHIL NFR BLD AUTO: 0.8 % (ref 0–7)
ERYTHROCYTE [DISTWIDTH] IN BLOOD BY AUTOMATED COUNT: 13.3 % (ref 11.5–14.5)
GLUCOSE 1H P 100 G GLC PO SERPL-MCNC: 146 MG/DL (ref 60–140)
HCT VFR BLD AUTO: 32.9 % (ref 35–45)
HGB BLD-MCNC: 11.2 G/DL (ref 12–15.5)
IMM GRANULOCYTES # BLD: 0.06 10*3/MM3 (ref 0–0.02)
IMM GRANULOCYTES NFR BLD: 0.4 % (ref 0–0.5)
LYMPHOCYTES # BLD AUTO: 1.95 10*3/MM3 (ref 0.6–4.2)
LYMPHOCYTES NFR BLD AUTO: 13.2 % (ref 10–50)
MCH RBC QN AUTO: 31.1 PG (ref 26.5–34)
MCHC RBC AUTO-ENTMCNC: 34 G/DL (ref 31.4–36)
MCV RBC AUTO: 91.4 FL (ref 80–98)
MONOCYTES # BLD AUTO: 0.73 10*3/MM3 (ref 0–0.9)
MONOCYTES NFR BLD AUTO: 4.9 % (ref 0–12)
NEUTROPHILS # BLD AUTO: 11.87 10*3/MM3 (ref 2–8.6)
NEUTROPHILS NFR BLD AUTO: 80.6 % (ref 37–80)
PLATELET # BLD AUTO: 307 10*3/MM3 (ref 150–450)
PMV BLD AUTO: 10.5 FL (ref 8–12)
RBC # BLD AUTO: 3.6 10*6/MM3 (ref 3.77–5.16)
WBC NRBC COR # BLD: 14.75 10*3/MM3 (ref 3.2–9.8)

## 2018-11-27 PROCEDURE — 99213 OFFICE O/P EST LOW 20 MIN: CPT | Performed by: OBSTETRICS & GYNECOLOGY

## 2018-11-27 PROCEDURE — 82950 GLUCOSE TEST: CPT | Performed by: NURSE PRACTITIONER

## 2018-11-27 PROCEDURE — 97110 THERAPEUTIC EXERCISES: CPT | Performed by: PHYSICAL THERAPIST

## 2018-11-27 PROCEDURE — 36415 COLL VENOUS BLD VENIPUNCTURE: CPT | Performed by: NURSE PRACTITIONER

## 2018-11-27 PROCEDURE — 97140 MANUAL THERAPY 1/> REGIONS: CPT | Performed by: PHYSICAL THERAPIST

## 2018-11-27 PROCEDURE — 85025 COMPLETE CBC W/AUTO DIFF WBC: CPT | Performed by: NURSE PRACTITIONER

## 2018-11-27 RX ORDER — FERROUS SULFATE 325(65) MG
325 TABLET ORAL
Qty: 90 TABLET | Refills: 11 | Status: SHIPPED | OUTPATIENT
Start: 2018-11-27 | End: 2019-02-21

## 2018-11-27 NOTE — THERAPY PROGRESS REPORT/RE-CERT
Outpatient Physical Therapy Women's Health Progress Note  [facility]     Patient Name: aMrychuy Aburto  : 1995  MRN: [id number]  Today's Date: 2018        Visit Date: 2018  Visit Number: 3/3  Recheck: 18  Insurance: 12 visits (18)  % improvement: 75-80%    Patient Active Problem List   Diagnosis   • Insomnia   • Anxiety   • Depression   • Malaise   • Menometrorrhagia   • Surveillance of contraceptive pill   • General medical exam   • Epigastric pain   • Cavernous hemangioma mediastinum   • Other acne   • Pelvic pain   • Positive pregnancy test   • Gastroesophageal reflux disease without esophagitis   • Sciatica of left side   • Acute left-sided low back pain with left-sided sciatica   • Anemia during pregnancy in third trimester        Past Medical History:   Diagnosis Date   • Acute sinusitis    • Agoraphobia with panic attacks    • Chronic depression    • Corneal abrasion    • Dysfunction of eustachian tube    • Encounter for general counseling on prescription of oral contraceptives    • Generalized anxiety disorder    • Hemangioma     mediastinal cavernous [id number] stable      • Herpes simplex    • Iron deficiency anemia    • Laryngitis    • Malaise and fatigue    • Moderate Vaginal discharge     reports frequent yeast infections      • Otitis media    • Ovarian cyst    • Streptococcal pharyngitis    • Syncope    • Tracheitis    • Verruca plantaris         Past Surgical History:   Procedure Laterality Date   • KNEE ARTHROSCOPY  2009    x2   • MEDIASTINOSCOPY  2011    Mediastinoscopy. Cavernous hemangioma. Right paratracheal mass.   • WISDOM TOOTH EXTRACTION           Visit Dx:    ICD-10-CM ICD-9-CM   1. Pelvic pain in pregnancy O26.899 646.80    R10.2 625.9   2. Back pain in pregnancy O99.89 646.80    M54.9 724.5           Women's Health     Row Name 18 1400             Pregnancy Questions    Number of Pregnancies  2  -SW      Number of Children  0  -SW       Due Date  02/16/19  -SW      Do you have radicular pain or numbness?  No  -SW        User Key  (r) = Recorded By, (t) = Taken By, (c) = Cosigned By    Initials Name Provider Type    Loan Friedman, PT Physical Therapist        PT Ortho     Row Name 11/27/18 1400       Subjective Comments    Subjective Comments  Drank glucola on way to treatment.  Reading at 2:50.  Past couple of days noted pain on R side but relates to position of baby.  More sore than pain. No leg pain noted.  Feels like restless leg syndrome at night.  Feels like legs constantly twitch.  Entire leg affected.  Patient relates that she doesn't urinate much at all.  She additionally reports slow transit of bowel, occuring no more than 2/week. She has always had trouble even prior to pregnancy.  She also admits that she stopped using prenatal pill with iron due to worsening of bowels.  When able to evacuate, patient reports pain and pressure is better in her low back.   -SW       Subjective Pain    Able to rate subjective pain?  yes  -SW    Post-Treatment Pain Level  0  -SW       Posture/Observations    Posture- WNL  Posture is WNL  -SW    Posture/Observations Comments  Good spirits.  Mood and judgement normal.  Alignment level bilaterally.  -SW       Quarter Clearing    Quarter Clearing  Lower Quarter Clearing  -SW       DTR- Lower Quarter Clearing    Patellar tendon (L2-4)  2- Normal response  -SW    Achilles tendon (S1-2)  2- Normal response  -SW       Neural Tension Signs- Lower Quarter Clearing    Slump  Negative  -SW    SLR  Negative  -SW    Prone knee flexion  Negative  -SW       Sensory Screen for Light Touch- Lower Quarter Clearing    L1 (inguinal area)  Intact  -SW    L2 (anterior mid thigh)  Intact  -SW    L3 (distal anterior thigh)  Intact  -SW    L4 (medial lower leg/foot)  Intact  -SW    L5 (lateral lower leg/great toe)  Intact  -SW    S1 (bottom of foot)  Intact  -SW       Myotomal Screen- Lower Quarter Clearing    Hip flexion  (L2)  5 (Normal)  -SW    Knee extension (L3)  5 (Normal)  -SW    Ankle DF (L4)  5 (Normal)  -SW    Great toe extension (L5)  WNL  -SW    Ankle PF (S1)  5 (Normal)  -SW    Knee flexion (S2)  5 (Normal)  -SW       Lumbar ROM Screen- Lower Quarter Clearing    Lumbar Flexion  Normal  -SW    Lumbar Extension  Normal  -SW    Lumbar Lateral Flexion  Normal  -SW    Lumbar Rotation  Normal  -SW       SI/Hip Screen- Lower Quarter Clearing    ASIS compression  Negative  -SW    ASIS distraction  Negative  -SW    Michelle's/Austin's test  Negative  -SW    Posterior thigh sheer  Negative  -SW    Pain in Senia's area  Negative  -SW       Special Tests/Palpation    Special Tests/Palpation  Lumbar/SI  -SW       Lumbosacral Accessory Motions    Lumbosacral Accessory Motions Tested?  Yes all segments with good AP mobility   -SW    PA glide- Sacral base  -- level and symmetrical   -SW    Innominate rotation  -- level and symmetrical this date.   -SW       Lumbosacral Palpation    Lumbosacral Palpation?  No Tenderness/Abnormality  -      User Key  (r) = Recorded By, (t) = Taken By, (c) = Cosigned By    Initials Name Provider Type    Loan Friedman, PT Physical Therapist                     PT Assessment/Plan     Row Name 11/27/18 1400          PT Assessment    Functional Limitations  Performance in leisure activities;Performance in self-care ADL;Performance in work activities;Performance in sport activities  -     Impairments  Impaired muscle length;Impaired muscle power;Impaired postural alignment;Joint mobility;Muscle strength;Pain;Poor body mechanics;Posture  -SW     Assessment Comments  Patient is progressing well with program.  All STG met this date. She is tolerating activity well with less pain and inc functional activity.  Concerns about slow transit bowel and leg cramps.  Requested patient to discuss with MD zabala: possibility of anemia for reason.  Otherwise, s/s doing well. Trigger to R L4/5 multifidus which improved  with strain/counterstrain treatment.   -SW     Rehab Potential  Good  -SW     Patient/caregiver participated in establishment of treatment plan and goals  Yes  -SW     Patient would benefit from skilled therapy intervention  Yes  -SW        PT Plan    PT Frequency  1x/week  -SW     Predicted Duration of Therapy Intervention (Therapy Eval)  8-12 visits  -SW     PT Plan Comments  FU with reassessment of alignment, multifidus training with steering wheel and advnace of exercise program.   -       User Key  (r) = Recorded By, (t) = Taken By, (c) = Cosigned By    Initials Name Provider Type    Loan Friedman, PT Physical Therapist            Exercises     Row Name 11/27/18 1400             Subjective Comments    Subjective Comments  Drank glucola on way to treatment.  Reading at 2:50.  Past couple of days noted pain on R side but relates to position of baby.  More sore than pain. No leg pain noted.  Feels like restless leg syndrome at night.  Feels like legs constantly twitch.  Entire leg affected.  Patient relates that she doesn't urinate much at all.  She additionally reports slow transit of bowel, occuring no more than 2/week. She has always had trouble even prior to pregnancy.  She also admits that she stopped using prenatal pill with iron due to worsening of bowels.  When able to evacuate, patient reports pain and pressure is better in her low back.   -SW         Subjective Pain    Able to rate subjective pain?  yes  -SW      Pre-Treatment Pain Level  0  -SW      Post-Treatment Pain Level  0  -SW         Exercise 1    Exercise Name 1  Hamstring stretch bilaterally seated  -SW      Reps 1  3  -SW      Time 1  30 sec  -SW         Exercise 2    Exercise Name 2  Adductor stretch bilaterally  -SW      Reps 2  3  -SW      Time 2  30 sec  -SW         Exercise 3    Exercise Name 3  piriformis stretch   -SW      Reps 3  3  -SW      Time 3  30 sec  -SW         Exercise 4    Exercise Name 4  angry cat stretch   -SW       Reps 4  10  -SW      Time 4  5 sec  -SW         Exercise 5    Exercise Name 5  prone multifidus activation  -      Reps 5  10  -SW         Exercise 6    Exercise Name 6  multifidus activation with prone TKE  -      Reps 6  10  -SW         Exercise 7    Exercise Name 7  Bowel education  -      Reps 7  --  -      Additional Comments  [place] stool scale of 3 usually 2/week.  Patient describes shy bladder and bowel.  Frustrated at times with inability to defecate.  Hydration importance discussed.   -         Exercise 8    Exercise Name 8  Bladder norms  -      Additional Comments  Bladder norms issued to patient this visit.  Patient's is reduced in frequency.  Usually 1x during 8 hour day of work.    -        User Key  (r) = Recorded By, (t) = Taken By, (c) = Cosigned By    Initials Name Provider Type    Loan Friedman, PT Physical Therapist           Manual Rx (last 36 hours)      Manual Treatments     Row Name 11/27/18 1700             Manual Rx 1    Manual Rx 1 Location  Trigger L4/5 multifidus R side  -      Manual Rx 1 Type  strain/counterstrain and MFR  -        User Key  (r) = Recorded By, (t) = Taken By, (c) = Cosigned By    Initials Name Provider Type    Loan Friedman, PT Physical Therapist                    PT OP Goals     Row Name 11/27/18 1400          PT Short Term Goals    STG Date to Achieve  12/27/18  -     STG 1  patient to be independent in [facility] for stretching and mobility exercises.  -     STG 1 Progress  Met  -     STG 2  patient to present with improved alignment to spine in supine position to allow no necessity of MET for alignment.  -     STG 2 Progress  Met  -     STG 3  patient to present with inc tolerance to gait activities with school class using appropriate shoe attire and pain no greater than 2/10 with gait.   -     STG 3 Progress  Met  -     STG 4  patient to demo proper transfers with log roll and neutral spine mechanics without verbal  cues required.  -     STG 4 Progress  Met  -     STG 5  patient to present with improved pain post end of day to mild/mod vs severe 10/10 reported with evaluation   -[facility] 5 Progress  Met  -        Long Term Goals    LTG Date to Achieve  02/15/19  -     LTG 1  subjectively improve 75% overall with activity and function  -     LTG 1 Progress  New  -     LTG 2  patient to continue work activities without restrictions or missed days of work due to pain  -     LTG 2 Progress  New  -     LTG 3  neg provacative tests for SI dysfunction  -     LTG 3 Progress  New  -     LTG 4  pain to rate as low-mild post activity vs severe after days activity.  -     LTG 4 Progress  New  -        Time Calculation    PT Goal Re-Cert Due Date  12/27/18  -       User Key  (r) = Recorded By, (t) = Taken By, (c) = Cosigned By    Initials Name Provider Type    Loan Friedman, PT Physical Therapist                          Time Calculation:   Start Time: 1405  Stop Time: 1450  Time Calculation (min): 45 min  Therapy Suggested Charges     Code   Minutes Charges    None           Therapy Charges for Today     Code Description Service Date Service Provider Modifiers Qty    20434888884 HC PT MANUAL THERAPY EA 15 MIN 11/27/2018 Loan Valderrama, PT GP 1    22761672908 HC PT THER PROC EA 15 MIN 11/27/2018 Loan Valderrama, PT GP 2                  Loan Valderrama, PT  11/27/2018

## 2018-11-28 NOTE — PROGRESS NOTES
Chief Complaint   Patient presents with   • OB Follow up   • High Risk Gestation     Marychuy Aburto is a 22 y.o. year old .  Patient's last menstrual period was 2018 (exact date).      She has a history of one previous miscarriage in .  She has a history of hemangiomas.  One is on the liver.     She lives in [place]. 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   Smoking status: Never Smoker                                                             Smokeless tobacco: Never Used                            Imaging   Transvaginal ultrasound performed 2018 shows single intrauterine pregnancy consistent with 5 weeks and 5 days with fetal heart rate 99 bpm and NICKIE by ultrasound 2019.     Limited ultrasound performed 2018 because of inability to hear heart tones shows single intrauterine pregnancy with fetal heart rate 166 bpm and crown-rump length consistent with 10 weeks and 6 days.     GERD is much improved.     She is having a boy named Deanna.     She declines genetic testing.     Ultrasound 2018 shows single intrauterine pregnancy consistent with 21 weeks and 4 days.  No abnormalities noted.  Three-vessel umbilical cord.  Fetal heart rate 149 bpm.  Cervix 4.4 cm long.  [facility] recommended repeat ultrasound for fetal spine in 8-10 weeks.     The patient complains of the following: Left sciatica.  We'll schedule physical therapy with Loan.     2018- MONICA visit today; feeling well, no complaints. Has had one appt with Loan for PT and states that she is doing her recommended stretches at home. She is still having some sciatic pain on the left side but is managing that better now. Still taking prilosec for heartburn and feels that it is well controlled. Denies dysuria, vb, LOF, constipation or headaches. Needs 1 hour glucose test in 3 weeks; drink and instructions given. F/U for MONICA visit in 3 weeks or sooner PRN.    2018 1 hour Glucola 146.  Hemoglobin  11.2 and hematocrit 32.9 with MCV 91.4.  We'll place on iron.  Schedule 3 hour GTT.  She has restless leg syndrome that 2 Tylenol at bedtime seemed to help.  Follow-up ultrasound in 2 weeks.    Obstetric History  #: 1, Date: 2012, Sex: None, Weight: None, GA: None, Delivery: None, Apgar1: None, Apgar5: None, Living: None, Birth Comments: None    #: 2, Date: None, Sex: None, Weight: None, GA: None, Delivery: None, Apgar1: None, Apgar5: None, Living: None, Birth Comments: None      The patient complains of the following: No new complaints    ROS  Vaginal bleeding: No   Nausea: No   Diarrhea: No   Constipation: No   Other:      Lab Results   Component Value Date    ABO O 07/26/2018    RH Positive 07/26/2018    ABSCRN Negative 07/26/2018       Specific topics discussed at today's visit: Ultrasound follow-up scheduled, scheduling three-hour GTT, taking iron, restless leg syndrome.  Tests done today: 1 hour Glucola and CBC.  Tests to be done at the next visit: Obstetrical ultrasound.    Marychuy was seen today for ob follow up and high risk gestation.    Diagnoses and all orders for this visit:    28 weeks gestation of pregnancy    Anemia during pregnancy in third trimester    Abnormal glucose tolerance test in pregnancy    Abnormal glucose in pregnancy, antepartum  -     Glucose Tolerance, 3 Hours; Future    Suspected maternal condition not found  -     US Ob Follow Up Transabdominal Approach; Future    Other orders  -     ferrous sulfate 325 (65 FE) MG tablet; Take 1 tablet by mouth 3 (Three) Times a Day With Meals.

## 2018-12-04 ENCOUNTER — HOSPITAL ENCOUNTER (OUTPATIENT)
Dept: PHYSICAL THERAPY | Facility: HOSPITAL | Age: 23
Setting detail: THERAPIES SERIES
Discharge: HOME | End: 2018-12-04

## 2018-12-04 DIAGNOSIS — M54.9 BACK PAIN IN PREGNANCY: ICD-10-CM

## 2018-12-04 DIAGNOSIS — R10.2 PELVIC PAIN IN PREGNANCY: Primary | ICD-10-CM

## 2018-12-04 DIAGNOSIS — O26.899 PELVIC PAIN IN PREGNANCY: Primary | ICD-10-CM

## 2018-12-04 DIAGNOSIS — O99.891 BACK PAIN IN PREGNANCY: ICD-10-CM

## 2018-12-04 PROCEDURE — 97140 MANUAL THERAPY 1/> REGIONS: CPT | Performed by: PHYSICAL THERAPIST

## 2018-12-05 NOTE — THERAPY TREATMENT NOTE
Outpatient Physical Therapy Women's Health Treatment Note  [facility]     Patient Name: Marychuy Aburto  : 1995  MRN: [id number]  Today's Date: 2018        Visit Date: 2018  Visit number:   Recheck: 18  Insurance: 12 visits (18)  % improvement: 75-80%  Visit Dx:    ICD-10-CM ICD-9-CM   1. Pelvic pain in pregnancy O26.899 646.80    R10.2 625.9   2. Back pain in pregnancy O99.89 646.80    M54.9 724.5       Patient Active Problem List   Diagnosis   • Insomnia   • Anxiety   • Depression   • Malaise   • Menometrorrhagia   • Surveillance of contraceptive pill   • General medical exam   • Epigastric pain   • Cavernous hemangioma mediastinum   • Other acne   • Pelvic pain   • Positive pregnancy test   • Gastroesophageal reflux disease without esophagitis   • Sciatica of left side   • Acute left-sided low back pain with left-sided sciatica   • Anemia during pregnancy in third trimester        Women's Health     Row Name 18 1500             Pregnancy Questions    Number of Pregnancies  2  -SW      Number of Children  0  -SW      How many weeks pregnant are you?  29 weeks  -SW      Due Date  19  -SW      Do you have radicular pain or numbness?  No  -SW        User Key  (r) = Recorded By, (t) = Taken By, (c) = Cosigned By    Initials Name Provider Type    SW Loan Valderrama, PT Physical Therapist        PT Ortho     Row Name 18 1500       Subjective Comments    Subjective Comments  Really not feeling well, having started on way to treatment.  Hits at sporadic times.  BP seems to run low traditionally.  Can't really pin point reasons for complaints. Good fetal movement.  Last visit was terrible with MD.  Iron low, sugar test has to be repeated (failed 1 hr glucola) and suggests something is wrong with fetal spine.  Rough weekend.  Saturday night could hardly walk.  Was active all day, more bending up/down movement.  Only slep 2 hours.  Back pain but stayed local  and did not radiate.  4D US noted that head is down and extremely low.  Bowels moving normal.  Had upset stomach on Saturday night.  Diarrhea noted that evening.  No dehydration to her knowledge.    -SW       Subjective Pain    Able to rate subjective pain?  yes  -SW    Pre-Treatment Pain Level  0  -SW    Post-Treatment Pain Level  0  -SW       Posture/Observations    Posture- WNL  Posture is WNL  -SW    Posture/Observations Comments  patient arrived at 1520 for appt.  seated BP R arm 122/82.  Retested /82 again after 10 minutes of seated position rest.  Alignment to spine ant rotated on L side and sacrum with L on R sacral torsion; R base deep and L prominent.   -SW       Quarter Clearing    Quarter Clearing  Lower Quarter Clearing  -SW       DTR- Lower Quarter Clearing    Patellar tendon (L2-4)  2- Normal response  -SW    Achilles tendon (S1-2)  2- Normal response  -SW       Neural Tension Signs- Lower Quarter Clearing    Slump  Negative  -SW    SLR  Negative  -SW    Prone knee flexion  Negative  -SW       Sensory Screen for Light Touch- Lower Quarter Clearing    L1 (inguinal area)  Intact  -SW    L2 (anterior mid thigh)  Intact  -SW    L3 (distal anterior thigh)  Intact  -SW    L4 (medial lower leg/foot)  Intact  -SW    L5 (lateral lower leg/great toe)  Intact  -SW    S1 (bottom of foot)  Intact  -SW       Myotomal Screen- Lower Quarter Clearing    Hip flexion (L2)  5 (Normal)  -SW    Knee extension (L3)  5 (Normal)  -SW    Ankle DF (L4)  5 (Normal)  -SW    Great toe extension (L5)  WNL  -SW    Ankle PF (S1)  5 (Normal)  -SW    Knee flexion (S2)  5 (Normal)  -SW       Lumbar ROM Screen- Lower Quarter Clearing    Lumbar Flexion  Normal  -SW    Lumbar Extension  Normal  -SW    Lumbar Lateral Flexion  Normal  -SW    Lumbar Rotation  Normal  -SW       SI/Hip Screen- Lower Quarter Clearing    ASIS compression  Negative  -SW    ASIS distraction  Negative  -SW    Michelle's/Austin's test  Negative  -SW    Posterior  thigh sheer  Negative  -SW    Pain in Senia's area  Negative  -SW       Special Tests/Palpation    Special Tests/Palpation  Lumbar/SI  -SW       Lumbosacral Accessory Motions    Lumbosacral Accessory Motions Tested?  Yes all segments with good AP mobility   -SW    PA glide- Sacral base  -- L on R sacral torsion  -SW    Innominate rotation  -- ant rotated on L side  -SW       Lumbosacral Palpation    Lumbosacral Palpation?  No Tenderness/Abnormality no triggering noted this visit  -      User Key  (r) = Recorded By, (t) = Taken By, (c) = Cosigned By    Initials Name Provider Type    Loan Friedman, PT Physical Therapist                     PT Assessment/Plan     Row Name 12/04/18 1500          PT Assessment    Functional Limitations  Performance in leisure activities;Performance in self-care ADL;Performance in work activities;Performance in sport activities  -     Impairments  Impaired muscle length;Impaired muscle power;Impaired postural alignment;Joint mobility;Muscle strength;Pain;Poor body mechanics;Posture  -SW     Assessment Comments  Patient responded well to manual therapy this visit.  Ant innom and sacral torsion L on R noted but corrected well with PT.  Patient presented with concerns this visit.  BP elevated (from her normal, but WFL), stressed with fetal development of fetus, etc.  Tried to calm anxieties this visit.  She is with inc postural awareness and doing well here.  Encouraged continual work.  will continue to monitor success.  All STG met and good progression toward other goal achievements.   -SW     Rehab Potential  Good  -SW     Patient/caregiver participated in establishment of treatment plan and goals  Yes  -SW     Patient would benefit from skilled therapy intervention  Yes  -SW        PT Plan    PT Frequency  1x/week  -SW     Predicted Duration of Therapy Intervention (Therapy Eval)  8-12 visits  -SW     PT Plan Comments  Repeat alignment assessment next visit.  BP check.  Add  "multifidus training next visit.   -       User Key  (r) = Recorded By, (t) = Taken By, (c) = Cosigned By    Initials Name Provider Type    Loan Friedman, PT Physical Therapist                Exercises     Row Name 12/04/18 1700 12/04/18 1500          Subjective Comments    Subjective Comments  --  Really not feeling well, having started on way to treatment.  Hits at sporadic times.  BP seems to run low traditionally.  Can't really pin point reasons for complaints. Good fetal movement.  Last visit was terrible with MD.  Iron low, sugar test has to be repeated (failed 1 hr glucola) and suggests something is wrong with fetal spine.  Rough weekend.  Saturday night could hardly walk.  Was active all day, more bending up/down movement.  Only slep 2 hours.  Back pain but stayed local and did not radiate.  4D US noted that head is down and extremely low.  Bowels moving normal.  Had upset stomach on Saturday night.  Diarrhea noted that evening.  No dehydration to her knowledge.    -        Subjective Pain    Able to rate subjective pain?  --  yes  -SW     Pre-Treatment Pain Level  --  0  -SW     Post-Treatment Pain Level  --  0  -SW        Total Minutes    66629 - PT Manual Therapy Minutes  30  -SW  --        Exercise 1    Exercise Name 1  --  Education over expectancies with  BP/cardiovascular demands of patient during 2nd/3rd trimester.  -     Additional Comments  --  Patient coaching with education on \"What to Expect\" with regards to BP and s/s. Encouraged Norms.  -       User Key  (r) = Recorded By, (t) = Taken By, (c) = Cosigned By    Initials Name Provider Type    Loan Friedman, PT Physical Therapist           Manual Rx (last 36 hours)      Manual Treatments     Row Name 12/04/18 1700             Total Minutes    60516 - PT Manual Therapy Minutes  30  -SW         Manual Rx 1    Manual Rx 1 Location  ant innom on L side  -SW      Manual Rx 1 Type  MET  -         Manual Rx 2    Manual Rx 2 " Location  sacral torsion L on R  -SW      Manual Rx 2 Type  MET  -         Manual Rx 3    Manual Rx 3 Location  Sacral release  -      Manual Rx 3 Type  MFR  -        User Key  (r) = Recorded By, (t) = Taken By, (c) = Cosigned By    Initials Name Provider Type    Loan Friedman, PT Physical Therapist                    PT OP Goals     Row Name 12/04/18 1500          PT Short Term Goals    STG Date to Achieve  12/27/18  -     STG 1  patient to be independent in [facility] for stretching and mobility exercises.  -     STG 1 Progress  Met  -     STG 2  patient to present with improved alignment to spine in supine position to allow no necessity of MET for alignment.  -     STG 2 Progress  Met  -     STG 3  patient to present with inc tolerance to gait activities with school class using appropriate shoe attire and pain no greater than 2/10 with gait.   -     STG 3 Progress  Met  -     STG 4  patient to demo proper transfers with log roll and neutral spine mechanics without verbal cues required.  -     STG 4 Progress  Met  -[facility] 5  patient to present with improved pain post end of day to mild/mod vs severe 10/10 reported with evaluation   -     STG 5 Progress  Met  -        Long Term Goals    LTG Date to Achieve  02/15/19  -     LTG 1  subjectively improve 75% overall with activity and function  -     LTG 1 Progress  New  -     LTG 2  patient to continue work activities without restrictions or missed days of work due to pain  -     LTG 2 Progress  Progressing  -     LTG 3  neg provacative tests for SI dysfunction  -     LTG 3 Progress  New  -     LTG 4  pain to rate as low-mild post activity vs severe after days activity.  -     LTG 4 Progress  Progressing  -        Time Calculation    PT Goal Re-Cert Due Date  12/27/18  -       User Key  (r) = Recorded By, (t) = Taken By, (c) = Cosigned By    Initials Name Provider Type    Loan Friedman, PT Physical Therapist            Therapy Education  Given: HEP, Symptoms/condition management  Program: New  How Provided: Verbal, Demonstration  Provided to: Patient  Level of Understanding: Teach back education performed, Verbalized, Demonstrated              Time Calculation:   Start Time: 1520  Stop Time: 1600  Time Calculation (min): 40 min  Therapy Suggested Charges     Code   Minutes Charges    11926 (CPT®) Hc Pt Neuromusc Re Education Ea 15 Min      99273 (CPT®) Hc Pt Ther Proc Ea 15 Min      95741 (CPT®) Hc Gait Training Ea 15 Min      57101 (CPT®) Hc Pt Therapeutic Act Ea 15 Min      93796 (CPT®) Hc Pt Manual Therapy Ea 15 Min 30 2    02267 (CPT®) Hc Pt Ther Massage- Per 15 Min      12477 (CPT®) Hc Pt Iontophoresis Ea 15 Min      15203 (CPT®) Hc Pt Elec Stim Ea-Per 15 Min      03604 (CPT®) Hc Pt Ultrasound Ea 15 Min      10980 (CPT®) Hc Pt Self Care/Mgmt/Train Ea 15 Min      85406 (CPT®) Hc Pt Prosthetic (S) Train Initial Encounter, Each 15 Min      51523 (CPT®) Hc Orthotic(S) Mgmt/Train Initial Encounter, Each 15min      71217 (CPT®) Hc Pt Aquatic Therapy Ea 15 Min      47799 (CPT®) Hc Pt Orthotic(S)/Prosthetic(S) Encounter, Each 15 Min       (CPT®) Hc Pt Electrical Stim Unattended      Total  30 2        Therapy Charges for Today     Code Description Service Date Service Provider Modifiers Qty    56913752782 HC PT MANUAL THERAPY EA 15 MIN 12/4/2018 Loan Valderrama, PT GP 2    34736659792 HC PT THER SUPP EA 15 MIN 12/4/2018 Loan Valderrama, PT GP 1                    Loan Valderrama, PT  12/4/2018

## 2018-12-11 ENCOUNTER — LAB (OUTPATIENT)
Dept: LAB | Facility: HOSPITAL | Age: 23
End: 2018-12-11

## 2018-12-11 ENCOUNTER — ROUTINE PRENATAL (OUTPATIENT)
Dept: OBSTETRICS AND GYNECOLOGY | Facility: CLINIC | Age: 23
End: 2018-12-11

## 2018-12-11 VITALS — SYSTOLIC BLOOD PRESSURE: 115 MMHG | WEIGHT: 155 LBS | BODY MASS INDEX: 26.61 KG/M2 | DIASTOLIC BLOOD PRESSURE: 80 MMHG

## 2018-12-11 DIAGNOSIS — O99.810 ABNORMAL GLUCOSE IN PREGNANCY, ANTEPARTUM: ICD-10-CM

## 2018-12-11 DIAGNOSIS — O99.013 ANEMIA DURING PREGNANCY IN THIRD TRIMESTER: ICD-10-CM

## 2018-12-11 DIAGNOSIS — Z3A.30 30 WEEKS GESTATION OF PREGNANCY: Primary | ICD-10-CM

## 2018-12-11 LAB
GLUCOSE 1H P 100 G GLC PO SERPL-MCNC: 140 MG/DL (ref 60–190)
GLUCOSE 2H P 100 G GLC PO SERPL-MCNC: 150 MG/DL (ref 60–165)
GLUCOSE 3H P 100 G GLC PO SERPL-MCNC: 115 MG/DL (ref 60–145)
GLUCOSE P FAST SERPL-MCNC: 85 MG/DL (ref 60–105)

## 2018-12-11 PROCEDURE — 82951 GLUCOSE TOLERANCE TEST (GTT): CPT

## 2018-12-11 PROCEDURE — 99213 OFFICE O/P EST LOW 20 MIN: CPT | Performed by: OBSTETRICS & GYNECOLOGY

## 2018-12-11 PROCEDURE — 82952 GTT-ADDED SAMPLES: CPT

## 2018-12-11 NOTE — PROGRESS NOTES
Chief Complaint   Patient presents with   • OB Follow up   • High Risk Gestation     Marychuy Aburto is a 22 y.o. year old .  Patient's last menstrual period was 2018 (exact date).      She has a history of one previous miscarriage in .  She has a history of hemangiomas.  One is on the liver.     She lives in [place]. 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   Smoking status: Never Smoker                                                             Smokeless tobacco: Never Used                            Imaging   Transvaginal ultrasound performed 2018 shows single intrauterine pregnancy consistent with 5 weeks and 5 days with fetal heart rate 99 bpm and NICKIE by ultrasound 2019.     Limited ultrasound performed 2018 because of inability to hear heart tones shows single intrauterine pregnancy with fetal heart rate 166 bpm and crown-rump length consistent with 10 weeks and 6 days.     GERD is much improved.     She is having a boy named Deanna.     She declines genetic testing.     Ultrasound 2018 shows single intrauterine pregnancy consistent with 21 weeks and 4 days.  No abnormalities noted.  Three-vessel umbilical cord.  Fetal heart rate 149 bpm.  Cervix 4.4 cm long.  [facility] recommended repeat ultrasound for fetal spine in 8-10 weeks.     The patient complains of the following: Left sciatica.  We'll schedule physical therapy with Loan.     2018- MONICA visit today; feeling well, no complaints. Has had one appt with Loan for PT and states that she is doing her recommended stretches at home. She is still having some sciatic pain on the left side but is managing that better now. Still taking prilosec for heartburn and feels that it is well controlled. Denies dysuria, vb, LOF, constipation or headaches. Needs 1 hour glucose test in 3 weeks; drink and instructions given. F/U for MONICA visit in 3 weeks or sooner PRN.     2018 1 hour Glucola 146.  Hemoglobin  11.2 and hematocrit 32.9 with MCV 91.4.  We'll place on iron.  Schedule 3 hour GTT.  She has restless leg syndrome that 2 Tylenol at bedtime seemed to help.  Follow-up ultrasound in 2 weeks.     December 11, 2018 3 hour glucose test normal at 85/140/150/115.  Ultrasound shows single intrauterine pregnancy in the vertex position.  Fetal heart rate 141 bpm.  Estimated fetal weight 3 lbs. 9 oz. or 54th percentile.  Three-vessel umbilical cord.  HOMERO normal at 14.8    Obstetric History  #: 1, Date: 2012, Sex: None, Weight: None, GA: None, Delivery: None, Apgar1: None, Apgar5: None, Living: None, Birth Comments: None    #: 2, Date: None, Sex: None, Weight: None, GA: None, Delivery: None, Apgar1: None, Apgar5: None, Living: None, Birth Comments: None      The patient complains of the following: No new complaints    ROS  Headache: No   Visual changes: No   Swelling in legs: No   Nausea: No   Constipation: No   Diarrhea: No   Contractions: No   Leaking fluid: No   Vaginal bleeding: No   Other:      Specific topics discussed at today's visit: Lab and ultrasound results.  Fetal kick counts.  Tests done today: 3 hour GTT and OB ultrasound  Tests to be done at the next visit: lisa Perrin was seen today for ob follow up and high risk gestation.    Diagnoses and all orders for this visit:    30 weeks gestation of pregnancy    Anemia during pregnancy in third trimester

## 2018-12-13 ENCOUNTER — HOSPITAL ENCOUNTER (OUTPATIENT)
Dept: PHYSICAL THERAPY | Facility: HOSPITAL | Age: 23
Setting detail: THERAPIES SERIES
Discharge: HOME | End: 2018-12-13

## 2018-12-13 DIAGNOSIS — M54.9 BACK PAIN IN PREGNANCY: ICD-10-CM

## 2018-12-13 DIAGNOSIS — O99.891 BACK PAIN IN PREGNANCY: ICD-10-CM

## 2018-12-13 DIAGNOSIS — O26.899 PELVIC PAIN IN PREGNANCY: Primary | ICD-10-CM

## 2018-12-13 DIAGNOSIS — R10.2 PELVIC PAIN IN PREGNANCY: Primary | ICD-10-CM

## 2018-12-13 PROCEDURE — 97110 THERAPEUTIC EXERCISES: CPT | Performed by: PHYSICAL THERAPIST

## 2018-12-13 NOTE — THERAPY TREATMENT NOTE
Outpatient Physical Therapy Women's Health Treatment Note  [facility]     Patient Name: Marychuy Aburto  : 1995  MRN: [id number]  Today's Date: 2018        Visit Date: 2018  Visit number:   Recheck: NEXT 18  Insurance: 12 visits (18)  % improvement: %    Visit Dx:    ICD-10-CM ICD-9-CM   1. Pelvic pain in pregnancy O26.899 646.80    R10.2 625.9   2. Back pain in pregnancy O99.89 646.80    M54.9 724.5       Patient Active Problem List   Diagnosis   • Insomnia   • Anxiety   • Depression   • Malaise   • Menometrorrhagia   • Surveillance of contraceptive pill   • General medical exam   • Epigastric pain   • Cavernous hemangioma mediastinum   • Other acne   • Pelvic pain   • Positive pregnancy test   • Gastroesophageal reflux disease without esophagitis   • Sciatica of left side   • Acute left-sided low back pain with left-sided sciatica   • Anemia during pregnancy in third trimester        Women's Health     Row Name 18 1500             Pregnancy Questions    Number of Pregnancies  2  -SW      Number of Children  0  -SW      How many weeks pregnant are you?  30 weeks  -SW      Due Date  19  -SW      Do you have radicular pain or numbness?  No  -SW        User Key  (r) = Recorded By, (t) = Taken By, (c) = Cosigned By    Initials Name Provider Type    Loan Friedman, PT Physical Therapist        PT Ortho     Row Name 18 1500       Subjective Comments    Subjective Comments  Yesterday squatted down to pick something up.  Felt sharp pains in stomach directly at midline.  Called friend who worked on OB for consult.  She suggested that she call OB on call.  Called OB on call (Cherelle) due to new onset of pain.  She advised with warm bath, Tylenol both of which she completed.  The pain hurt when pushing on stomach directly at midline under breasts.  No difficulty with breathing (inhalation/exhalation).  She felt some reflux and took Tums to help.   The day of onset was following the day of 3 hour glucose.  She had even thought it may have something to do with that. Noted that she felt better upon rest and even better with bowel activity.  Pain has been better today though.  No noted discomfort now. Patient voices concerns as she is not sure what to expect as this is her first child. US of fetus came back perfect and she passed her 3 hr Glucola as well.    -SW       Subjective Pain    Able to rate subjective pain?  yes  -SW    Pre-Treatment Pain Level  0  -SW    Post-Treatment Pain Level  0  -SW       Posture/Observations    Posture- WNL  Posture is WNL  -SW    Posture/Observations Comments  unremarkable gait.  transfers without distress. Pinpoints directly below xphoid process her pain location. No evidence of diastasis recti over 2 finger widths present. Tenderness directly at umbilicus and directly below.  Alignment of pelvic girdle level this date.  Tender at R SI.  AP glide to thoracics offered an audible pop to R T6/7.    -SW       Quarter Clearing    Quarter Clearing  Lower Quarter Clearing  -SW       DTR- Lower Quarter Clearing    Patellar tendon (L2-4)  2- Normal response  -SW    Achilles tendon (S1-2)  2- Normal response  -SW       Neural Tension Signs- Lower Quarter Clearing    Slump  Negative  -SW    SLR  Negative  -SW    Prone knee flexion  Negative  -SW       Sensory Screen for Light Touch- Lower Quarter Clearing    L1 (inguinal area)  Intact  -SW    L2 (anterior mid thigh)  Intact  -SW    L3 (distal anterior thigh)  Intact  -SW    L4 (medial lower leg/foot)  Intact  -SW    L5 (lateral lower leg/great toe)  Intact  -SW    S1 (bottom of foot)  Intact  -SW       Myotomal Screen- Lower Quarter Clearing    Hip flexion (L2)  5 (Normal)  -SW    Knee extension (L3)  5 (Normal)  -SW    Ankle DF (L4)  5 (Normal)  -SW    Great toe extension (L5)  WNL  -SW    Ankle PF (S1)  5 (Normal)  -SW    Knee flexion (S2)  5 (Normal)  -SW       Lumbar ROM Screen- Lower  Quarter Clearing    Lumbar Flexion  Normal  -SW    Lumbar Extension  Normal  -SW    Lumbar Lateral Flexion  Normal  -SW    Lumbar Rotation  Normal  -SW       SI/Hip Screen- Lower Quarter Clearing    ASIS compression  Negative  -SW    ASIS distraction  Negative  -SW    Michelle's/Austin's test  Negative  -SW    Posterior thigh sheer  Negative  -SW    Pain in Senia's area  Negative  -SW       Special Tests/Palpation    Special Tests/Palpation  Lumbar/SI  -SW       Lumbosacral Accessory Motions    Lumbosacral Accessory Motions Tested?  Yes all segments with good AP mobility   -SW    PA glide- Sacral base  -- level but AP glide to sacrum ttp on R side.  -SW    Innominate rotation  -- alignment level at innominates  -SW       Lumbosacral Palpation    Lumbosacral Palpation?  No Tenderness/Abnormality no triggering noted this visit  -      User Key  (r) = Recorded By, (t) = Taken By, (c) = Cosigned By    Initials Name Provider Type    Loan Friedman, PT Physical Therapist                     PT Assessment/Plan     Row Name 12/13/18 1500          PT Assessment    Functional Limitations  Performance in leisure activities;Performance in self-care ADL;Performance in work activities;Performance in sport activities  -     Impairments  Impaired muscle length;Impaired muscle power;Impaired postural alignment;Joint mobility;Muscle strength;Pain;Poor body mechanics;Posture  -SW     Assessment Comments  Patient's concerns re: new onset of symptoms discussed in detail this visit to ensure her with clinical support of reasoning.  Patient's s/s were better this date than at first date of onset.  Patient/staff ruled out blood clot/pre-eclampsia.  No dehydration per patient. Anticipate one of the following: separation to rectus abdominis muscle fibers unidentified by diastasis presence; fetal position compressing nerve bundle or rib creating pressure ot area, epigastic pain due to ligamentous laxity of sphincter or liver  swelling due to metabolism of medication/glucola.  Patient assures therapy of improved s/s.  Alignment this date improved without asymmetry noted.  Patient is progressing well without inc s/s at this time. Good progression with goals thus far.  -SW     Rehab Potential  Good  -SW     Patient/caregiver participated in establishment of treatment plan and goals  Yes  -SW     Patient would benefit from skilled therapy intervention  Yes  -SW        PT Plan    PT Frequency  -- 12/31 next follow up  -     Predicted Duration of Therapy Intervention (Therapy Eval)  8-12 visits  -     PT Plan Comments  Reassess s/s.  Add additional multifidus training.  -       User Key  (r) = Recorded By, (t) = Taken By, (c) = Cosigned By    Initials Name Provider Type    Loan Friedman, PT Physical Therapist                Exercises     Row Name 12/13/18 1500             Subjective Comments    Subjective Comments  Yesterday squatted down to pick something up.  Felt sharp pains in stomach directly at midline.  Called friend who worked on OB for consult.  She suggested that she call OB on call.  Called OB on call (Cherelle) due to new onset of pain.  She advised with warm bath, Tylenol both of which she completed.  The pain hurt when pushing on stomach directly at midline under breasts.  No difficulty with breathing (inhalation/exhalation).  She felt some reflux and took Tums to help.  The day of onset was following the day of 3 hour glucose.  She had even thought it may have something to do with that. Noted that she felt better upon rest and even better with bowel activity.  Pain has been better today though.  No noted discomfort now. Patient voices concerns as she is not sure what to expect as this is her first child. US of fetus came back perfect and she passed her 3 hr Glucola as well.    -         Subjective Pain    Able to rate subjective pain?  yes  -SW      Pre-Treatment Pain Level  0  -SW      Post-Treatment Pain Level   0  -SW         Exercise 1    Exercise Name 1  alignment check  -      Additional Comments  good position to sacrum and pelvic girdle bilaterally  -         Exercise 2    Exercise Name 2  Prone on elbows  -      Reps 2  2  -SW      Time 2  1  -SW         Exercise 3    Exercise Name 3  prone opp leg raises  -SW      Reps 3  10  -SW         Exercise 4    Exercise Name 4  prone opp leg and arm raises.   -      Reps 4  10  -SW         Exercise 5    Exercise Name 5  standing multifidus steering wheel  -      Reps 5  3  -SW      Time 5  30  -SW         Exercise 6    Exercise Name 6  discussion/analyzation of s/s  -SW      Additional Comments  Discussion with MD/midwife clinical presentation.  -        User Key  (r) = Recorded By, (t) = Taken By, (c) = Cosigned By    Initials Name Provider Type    Loan Friedman, PT Physical Therapist                      PT OP Goals     Row Name 12/13/18 1716 12/13/18 1500       PT Short Term Goals    STG Date to Achieve  --  12/27/18  -    STG 1  --  patient to be independent in [facility] for stretching and mobility exercises.  -    STG 1 Progress  --  Met  -    STG 2  --  patient to present with improved alignment to spine in supine position to allow no necessity of MET for alignment.  -    STG 2 Progress  --  Met  -    STG 3  --  patient to present with inc tolerance to gait activities with school class using appropriate shoe attire and pain no greater than 2/10 with gait.   -    STG 3 Progress  --  Met  -    STG 4  --  patient to demo proper transfers with log roll and neutral spine mechanics without verbal cues required.  -[facility] 4 Progress  --  Met  -    STG 5  --  patient to present with improved pain post end of day to mild/mod vs severe 10/10 reported with evaluation   -    STG 5 Progress  --  Met  -       Long Term Goals    LTG Date to Achieve  --  02/15/19  -    LTG 1  --  subjectively improve 75% overall with activity and function  -     LTG 1 Progress  --  New  -    LTG 2  --  patient to continue work activities without restrictions or missed days of work due to pain  -    LTG 2 Progress  --  Progressing  -    LTG 3  --  neg provacative tests for SI dysfunction  -    LTG 3 Progress  --  Met  -    LTG 3 Progress Comments  --  pain at Senia's region, but otherwise negative provacative tests.  -    LTG 4  --  pain to rate as low-mild post activity vs severe after days activity.  -    LTG 4 Progress  --  Progressing  -       Time Calculation    PT Goal Re-Cert Due Date  -- next  -  12/27/18  -      User Key  (r) = Recorded By, (t) = Taken By, (c) = Cosigned By    Initials Name Provider Type    Loan Friedman, PT Physical Therapist           Therapy Education  Given: HEP  Program: Reinforced  How Provided: Verbal, Demonstration  Provided to: Patient  Level of Understanding: Teach back education performed, Verbalized, Demonstrated              Time Calculation:   Start Time: 1530  Stop Time: 1615  Time Calculation (min): 45 min  Therapy Suggested Charges     Code   Minutes Charges    None           Therapy Charges for Today     Code Description Service Date Service Provider Modifiers Qty    94189365536 HC PT THER PROC EA 15 MIN 12/13/2018 Loan Valderrama, PT GP 3    [id number] HC PT THER SUPP EA 15 MIN 12/13/2018 Loan Valderrama, PT GP 1            Therapeutic Support for MD adonay Valderrama, PT  12/13/2018

## 2018-12-31 ENCOUNTER — HOSPITAL ENCOUNTER (OUTPATIENT)
Dept: PHYSICAL THERAPY | Facility: HOSPITAL | Age: 23
Setting detail: THERAPIES SERIES
Discharge: HOME | End: 2018-12-31

## 2018-12-31 ENCOUNTER — APPOINTMENT (OUTPATIENT)
Dept: PHYSICAL THERAPY | Facility: HOSPITAL | Age: 23
End: 2018-12-31

## 2018-12-31 ENCOUNTER — ROUTINE PRENATAL (OUTPATIENT)
Dept: OBSTETRICS AND GYNECOLOGY | Facility: CLINIC | Age: 23
End: 2018-12-31

## 2018-12-31 VITALS — DIASTOLIC BLOOD PRESSURE: 86 MMHG | WEIGHT: 156 LBS | SYSTOLIC BLOOD PRESSURE: 133 MMHG | BODY MASS INDEX: 26.78 KG/M2

## 2018-12-31 DIAGNOSIS — R10.2 PELVIC PAIN IN PREGNANCY: Primary | ICD-10-CM

## 2018-12-31 DIAGNOSIS — F32.A DEPRESSION AFFECTING PREGNANCY IN FIRST TRIMESTER, ANTEPARTUM: Primary | ICD-10-CM

## 2018-12-31 DIAGNOSIS — O99.341 DEPRESSION AFFECTING PREGNANCY IN FIRST TRIMESTER, ANTEPARTUM: Primary | ICD-10-CM

## 2018-12-31 DIAGNOSIS — O99.891 BACK PAIN IN PREGNANCY: ICD-10-CM

## 2018-12-31 DIAGNOSIS — M54.32 SCIATICA OF LEFT SIDE: ICD-10-CM

## 2018-12-31 DIAGNOSIS — Z3A.33 33 WEEKS GESTATION OF PREGNANCY: ICD-10-CM

## 2018-12-31 DIAGNOSIS — F41.9 ANXIETY DURING PREGNANCY: ICD-10-CM

## 2018-12-31 DIAGNOSIS — O99.013 ANEMIA DURING PREGNANCY IN THIRD TRIMESTER: ICD-10-CM

## 2018-12-31 DIAGNOSIS — M54.9 BACK PAIN IN PREGNANCY: ICD-10-CM

## 2018-12-31 DIAGNOSIS — O99.340 ANXIETY DURING PREGNANCY: ICD-10-CM

## 2018-12-31 DIAGNOSIS — O26.899 PELVIC PAIN IN PREGNANCY: Primary | ICD-10-CM

## 2018-12-31 DIAGNOSIS — G47.9 SLEEP DISTURBANCE: ICD-10-CM

## 2018-12-31 PROCEDURE — 99213 OFFICE O/P EST LOW 20 MIN: CPT | Performed by: ADVANCED PRACTICE MIDWIFE

## 2018-12-31 RX ORDER — HYDROXYZINE PAMOATE 25 MG/1
25 CAPSULE ORAL 3 TIMES DAILY PRN
Qty: 90 CAPSULE | Refills: 0 | Status: SHIPPED | OUTPATIENT
Start: 2018-12-31 | End: 2019-01-25

## 2018-12-31 RX ORDER — BUSPIRONE HYDROCHLORIDE 10 MG/1
5 TABLET ORAL 3 TIMES DAILY
Qty: 90 TABLET | Refills: 8 | Status: SHIPPED | OUTPATIENT
Start: 2018-12-31 | End: 2019-01-25

## 2019-01-02 NOTE — PROGRESS NOTES
CC: MONICA visit, history reviewed, changes noted    ROS:Positive Worsening depression & anxiety   Negative leaking fluid from the vagina, swelling in her legs, headache, visual changes, low back pain and heartburn    Patient states that she has a history of depression & anxiety & had been medicates with Effexor & Klonopin until becoming pregnant. At that time she did stop her medication & has been able to manage & cope until the last 2 weeks.   Marychuy states that she is having difficulty sleeping only getting 2-3 hours a night & this is what is causing her anxiety to become unmanageable. She is also experiencing a decrease in appetitie   She does not want to start an antidepressant at this time, bit would like something to help with sleep & anxiety as needed   She denies thoughts of self harm or suicdal ideation.    Educated on:How to take Buspar & Vistaril, to call if symptoms worsen, preeclampsia warning signs (BP increased from previous visits).      A/Plan: f/u in 1 week/s for regular prenatal appt  Marychuy was seen today for anxiety.    Diagnoses and all orders for this visit:    Depression affecting pregnancy in first trimester, antepartum    33 weeks gestation of pregnancy    Anemia during pregnancy in third trimester    Sciatica of left side    Anxiety during pregnancy    Sleep disturbance    Other orders  -     hydrOXYzine (VISTARIL) 25 MG capsule; Take 1 capsule by mouth 3 (Three) Times a Day As Needed for Itching.  -     busPIRone (BUSPAR) 10 MG tablet; Take 0.5 tablets by mouth 3 (Three) Times a Day.

## 2019-01-03 ENCOUNTER — ROUTINE PRENATAL (OUTPATIENT)
Dept: OBSTETRICS AND GYNECOLOGY | Facility: CLINIC | Age: 24
End: 2019-01-03

## 2019-01-03 VITALS — BODY MASS INDEX: 27.33 KG/M2 | DIASTOLIC BLOOD PRESSURE: 82 MMHG | SYSTOLIC BLOOD PRESSURE: 132 MMHG | WEIGHT: 159.2 LBS

## 2019-01-03 DIAGNOSIS — O99.343 ANXIETY IN PREGNANCY IN THIRD TRIMESTER, ANTEPARTUM: ICD-10-CM

## 2019-01-03 DIAGNOSIS — F41.9 ANXIETY IN PREGNANCY IN THIRD TRIMESTER, ANTEPARTUM: ICD-10-CM

## 2019-01-03 DIAGNOSIS — O99.013 ANEMIA DURING PREGNANCY IN THIRD TRIMESTER: ICD-10-CM

## 2019-01-03 DIAGNOSIS — Z3A.33 33 WEEKS GESTATION OF PREGNANCY: Primary | ICD-10-CM

## 2019-01-03 PROCEDURE — 99213 OFFICE O/P EST LOW 20 MIN: CPT | Performed by: OBSTETRICS & GYNECOLOGY

## 2019-01-03 PROCEDURE — 87653 STREP B DNA AMP PROBE: CPT | Performed by: OBSTETRICS & GYNECOLOGY

## 2019-01-03 RX ORDER — CITALOPRAM 40 MG/1
40 TABLET ORAL DAILY
Qty: 30 TABLET | Refills: 12 | Status: SHIPPED | OUTPATIENT
Start: 2019-01-03 | End: 2019-10-17

## 2019-01-03 NOTE — PROGRESS NOTES
Chief Complaint   Patient presents with   • OB Follow up   • High Risk Gestation     Marychuy Aburto is a 22 y.o. year old .  Patient's last menstrual period was 2018 (exact date).      She has a history of one previous miscarriage in .  She has a history of hemangiomas.  One is on the liver.     She lives in [place]. 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   Smoking status: Never Smoker                                                             Smokeless tobacco: Never Used                            Imaging   Transvaginal ultrasound performed 2018 shows single intrauterine pregnancy consistent with 5 weeks and 5 days with fetal heart rate 99 bpm and NICKIE by ultrasound 2019.     Limited ultrasound performed 2018 because of inability to hear heart tones shows single intrauterine pregnancy with fetal heart rate 166 bpm and crown-rump length consistent with 10 weeks and 6 days.     GERD is much improved.     She is having a boy named Deanna.     She declines genetic testing.     Ultrasound 2018 shows single intrauterine pregnancy consistent with 21 weeks and 4 days.  No abnormalities noted.  Three-vessel umbilical cord.  Fetal heart rate 149 bpm.  Cervix 4.4 cm long.  [facility] recommended repeat ultrasound for fetal spine in 8-10 weeks.     The patient complains of the following: Left sciatica.  We'll schedule physical therapy with Loan.     2018- MONICA visit today; feeling well, no complaints. Has had one appt with Loan for PT and states that she is doing her recommended stretches at home. She is still having some sciatic pain on the left side but is managing that better now. Still taking prilosec for heartburn and feels that it is well controlled. Denies dysuria, vb, LOF, constipation or headaches. Needs 1 hour glucose test in 3 weeks; drink and instructions given. F/U for MONICA visit in 3 weeks or sooner PRN.     2018 1 hour Glucola 146.  Hemoglobin  11.2 and hematocrit 32.9 with MCV 91.4.  We'll place on iron.  Schedule 3 hour GTT.  She has restless leg syndrome that 2 Tylenol at bedtime seemed to help.  Follow-up ultrasound in 2 weeks.     December 11, 2018 3 hour glucose test normal at 85/140/150/115.  Ultrasound shows single intrauterine pregnancy in the vertex position.  Fetal heart rate 141 bpm.  Estimated fetal weight 3 lbs. 9 oz. or 54th percentile.  Three-vessel umbilical cord.  HOMERO normal at 14.8     January 3, 2019 and a has a long history of depression and anxiety.  Anxiety is getting significantly worse now.  I'm placing her on Celexa 20 mg a day.  Also recommended vitamin D3 and B12 supplementation.  GBS swab performed today.    Obstetric History  #: 1, Date: 2012, Sex: None, Weight: None, GA: None, Delivery: None, Apgar1: None, Apgar5: None, Living: None, Birth Comments: None    #: 2, Date: None, Sex: None, Weight: None, GA: None, Delivery: None, Apgar1: None, Apgar5: None, Living: None, Birth Comments: None      The patient complains of the following: Worsening anxiety    ROS  Headache: No   Visual changes: No   Swelling in legs: No   Nausea: No   Constipation: No   Diarrhea: No   Contractions: No   Leaking fluid: No   Vaginal bleeding: No   Other:      Specific topics discussed at today's visit: Anxiety  Tests done today: GBS Swab  Tests to be done at the next visit: none    Marychuy was seen today for ob follow up and high risk gestation.    Diagnoses and all orders for this visit:    33 weeks gestation of pregnancy  -     Group B Strep (Molecular) - Swab, Vaginal/Rectum    Anxiety in pregnancy in third trimester, antepartum    Anemia during pregnancy in third trimester    Other orders  -     citalopram (CeleXA) 40 MG tablet; Take 1 tablet by mouth Daily.

## 2019-01-04 LAB — GROUP B STREP, DNA: NEGATIVE

## 2019-01-06 ENCOUNTER — APPOINTMENT (OUTPATIENT)
Dept: ULTRASOUND IMAGING | Facility: HOSPITAL | Age: 24
End: 2019-01-06

## 2019-01-06 ENCOUNTER — HOSPITAL ENCOUNTER (OUTPATIENT)
Facility: HOSPITAL | Age: 24
Discharge: HOME | End: 2019-01-06
Attending: OBSTETRICS & GYNECOLOGY | Admitting: OBSTETRICS & GYNECOLOGY

## 2019-01-06 VITALS
SYSTOLIC BLOOD PRESSURE: 130 MMHG | RESPIRATION RATE: 18 BRPM | BODY MASS INDEX: 26.49 KG/M2 | OXYGEN SATURATION: 99 % | HEART RATE: 96 BPM | DIASTOLIC BLOOD PRESSURE: 84 MMHG | TEMPERATURE: 98 F | WEIGHT: 159 LBS | HEIGHT: 65 IN

## 2019-01-06 DIAGNOSIS — O16.9 HIGH BLOOD PRESSURE AFFECTING PREGNANCY, ANTEPARTUM: Primary | ICD-10-CM

## 2019-01-06 PROBLEM — O13.3 GESTATIONAL HYPERTENSION, THIRD TRIMESTER: Status: ACTIVE | Noted: 2019-01-06

## 2019-01-06 LAB
ALBUMIN SERPL-MCNC: 3.3 G/DL (ref 3.4–4.8)
ALBUMIN/GLOB SERPL: 1.4 G/DL (ref 1.1–1.8)
ALP SERPL-CCNC: 153 U/L (ref 38–126)
ALT SERPL W P-5'-P-CCNC: 23 U/L (ref 9–52)
ANION GAP SERPL CALCULATED.3IONS-SCNC: 7 MMOL/L (ref 5–15)
AST SERPL-CCNC: 22 U/L (ref 14–36)
BASOPHILS # BLD AUTO: 0.01 10*3/MM3 (ref 0–0.2)
BASOPHILS NFR BLD AUTO: 0.1 % (ref 0–2)
BILIRUB SERPL-MCNC: 0.6 MG/DL (ref 0.2–1.3)
BUN BLD-MCNC: 10 MG/DL (ref 7–21)
BUN/CREAT SERPL: 13.2 (ref 7–25)
CALCIUM SPEC-SCNC: 9 MG/DL (ref 8.4–10.2)
CHLORIDE SERPL-SCNC: 103 MMOL/L (ref 95–110)
CO2 SERPL-SCNC: 24 MMOL/L (ref 22–31)
CREAT BLD-MCNC: 0.76 MG/DL (ref 0.5–1)
CREAT UR-MCNC: 257.2 MG/DL
DEPRECATED RDW RBC AUTO: 49.8 FL (ref 36.4–46.3)
EOSINOPHIL # BLD AUTO: 0.09 10*3/MM3 (ref 0–0.7)
EOSINOPHIL NFR BLD AUTO: 0.8 % (ref 0–7)
ERYTHROCYTE [DISTWIDTH] IN BLOOD BY AUTOMATED COUNT: 14.8 % (ref 11.5–14.5)
GFR SERPL CREATININE-BSD FRML MDRD: 93 ML/MIN/1.73 (ref 71–165)
GLOBULIN UR ELPH-MCNC: 2.4 GM/DL (ref 2.3–3.5)
GLUCOSE BLD-MCNC: 114 MG/DL (ref 60–100)
HCT VFR BLD AUTO: 33.9 % (ref 35–45)
HGB BLD-MCNC: 11.5 G/DL (ref 12–15.5)
IMM GRANULOCYTES # BLD AUTO: 0.04 10*3/MM3 (ref 0–0.02)
IMM GRANULOCYTES NFR BLD AUTO: 0.4 % (ref 0–0.5)
LYMPHOCYTES # BLD AUTO: 1.49 10*3/MM3 (ref 0.6–4.2)
LYMPHOCYTES NFR BLD AUTO: 13.1 % (ref 10–50)
MCH RBC QN AUTO: 31.3 PG (ref 26.5–34)
MCHC RBC AUTO-ENTMCNC: 33.9 G/DL (ref 31.4–36)
MCV RBC AUTO: 92.1 FL (ref 80–98)
MONOCYTES # BLD AUTO: 0.91 10*3/MM3 (ref 0–0.9)
MONOCYTES NFR BLD AUTO: 8 % (ref 0–12)
NEUTROPHILS # BLD AUTO: 8.81 10*3/MM3 (ref 2–8.6)
NEUTROPHILS NFR BLD AUTO: 77.6 % (ref 37–80)
PLATELET # BLD AUTO: 271 10*3/MM3 (ref 150–450)
PMV BLD AUTO: 10.7 FL (ref 8–12)
POTASSIUM BLD-SCNC: 4.2 MMOL/L (ref 3.5–5.1)
PROT SERPL-MCNC: 5.7 G/DL (ref 6.3–8.6)
PROT UR-MCNC: 9.1 MG/DL
PROT/CREAT UR: 35.4 MG/G CREA (ref 0–200)
RBC # BLD AUTO: 3.68 10*6/MM3 (ref 3.77–5.16)
SODIUM BLD-SCNC: 134 MMOL/L (ref 137–145)
WBC NRBC COR # BLD: 11.35 10*3/MM3 (ref 3.2–9.8)

## 2019-01-06 PROCEDURE — 76819 FETAL BIOPHYS PROFIL W/O NST: CPT

## 2019-01-06 PROCEDURE — 76816 OB US FOLLOW-UP PER FETUS: CPT

## 2019-01-06 PROCEDURE — G0463 HOSPITAL OUTPT CLINIC VISIT: HCPCS

## 2019-01-06 PROCEDURE — 80053 COMPREHEN METABOLIC PANEL: CPT | Performed by: OBSTETRICS & GYNECOLOGY

## 2019-01-06 PROCEDURE — 84156 ASSAY OF PROTEIN URINE: CPT | Performed by: OBSTETRICS & GYNECOLOGY

## 2019-01-06 PROCEDURE — 36415 COLL VENOUS BLD VENIPUNCTURE: CPT | Performed by: OBSTETRICS & GYNECOLOGY

## 2019-01-06 PROCEDURE — 85025 COMPLETE CBC W/AUTO DIFF WBC: CPT | Performed by: OBSTETRICS & GYNECOLOGY

## 2019-01-06 PROCEDURE — 82570 ASSAY OF URINE CREATININE: CPT | Performed by: OBSTETRICS & GYNECOLOGY

## 2019-01-06 PROCEDURE — 59025 FETAL NON-STRESS TEST: CPT

## 2019-01-06 NOTE — H&P
[facility]  Obstetric History and Physical    Chief Complaint   Patient presents with   • Elevated Blood Pressure     reports good fetal movement, no other c/o           Patient is a 23 y.o. female  currently at 34w1d, who presents with some faint feelings last night and repeated this morning.  Really near syncopal in nature.  This morning she took her blood pressure and it was elevated systolics in the 140 diastolics in the low 100s.  She called a friend that works in labor and delivery and they told her to come in for evaluation here her initial blood pressure was systolic in the 140s and by direct remainder have been okay no headaches visual changes or epigastric pain urine protein creatinine ratio normal.  Biophysical profile 8 of 8.  NST reactive.  Platelets good creatinine good AST and ALTs.  Both good.    Overall I told her I did not think she had preeclampsia at this point but she may be developing gestational hypertension I discussed the advantages of admission for 24 hours for observation and collect a 24-hour urine versus the disadvantage.  And I think she is responsible and she wants to go home I feel comfortable with this I think she'll return as indicated she's been monitor blood pressure at home follow-up with one in 1-2 days  Her prenatal care is   Is reviewed      Obstetric History   #: 1, Date: , Sex: None, Weight: None, GA: None, Delivery: None, Apgar1: None, Apgar5: None, Living: None, Birth Comments: None    #: 2, Date: None, Sex: None, Weight: None, GA: None, Delivery: None, Apgar1: None, Apgar5: None, Living: None, Birth Comments: None       The following portions of the patients history were reviewed and updated as appropriate: current medications, allergies, past medical history, past surgical history, past family history, past social history and problem list .       Prenatal Information:  Prenatal Results     Initial Prenatal Labs     Test Value Reference Range Date Time     Hemoglobin 12.9 g/dL 12.0 - 15.5 g/dL 07/26/18 1054    Hematocrit 38.2 % 35.0 - 45.0 % 07/26/18 1054    Platelets 271 10*3/mm3 150 - 450 10*3/mm3 01/06/19 1207    Rubella IgG 236.0 IU/mL 0.0 - 9.9 IU/mL 07/26/18 1054      Immune  Immune 07/26/18 1054    Hepatitis B SAg Negative  Negative 07/26/18 1054    Hepatitis C Ab Negative  Negative 07/26/18 1054    RPR Non-Reactive  Non-Reactive 07/26/18 1054    ABO O   07/26/18 1054    Rh Positive   07/26/18 1054    Antibody Screen Negative   07/26/18 1054    HIV Negative  Negative 07/26/18 1054    Urine Culture No growth at 24 hours   07/26/18 1054    Gonorrhea        Chlamydia        TSH 1.150 mIU/mL 0.460 - 4.680 mIU/mL 07/26/18 0841          2nd and 3rd Trimester     Test Value Reference Range Date Time    Hemoglobin (repeated) 11.5 g/dL 12.0 - 15.5 g/dL 01/06/19 1207    Hematocrit (repeated) 33.9 % 35.0 - 45.0 % 01/06/19 1207     mg/dL 60 - 190 mg/dL 12/11/18 0848    Antibody Screen (repeated)        GTT Fasting        GTT 1 Hr        GTT 2 Hr        GTT 3 Hr        Group B Strep Negative  Negative 01/03/19 0905          Drug Screening     Test Value Reference Range Date Time    Amphetamine Screen Negative  Negative 07/26/18 1054    Barbiturate Screen Negative  Negative 07/26/18 1054    Benzodiazepine Screen Negative  Negative 07/26/18 1054    Methadone Screen Negative  Negative 07/26/18 1054    Phencyclidine Screen        Opiates Screen Negative  Negative 07/26/18 1054    THC Screen Negative  Negative 07/26/18 1054    Cocaine Screen Negative  Negative 07/26/18 1054    Propoxyphene Screen        Buprenorphine Screen        Methamphetamine Screen        Oxycodone Screen Negative  Negative 07/26/18 1054    Tricyclic Antidepressants Screen              Other (Risk screening)     Test Value Reference Range Date Time    Varicella IgG        Parvovirus IgG        CMV IgG        Cystic Fibrosis        Hemoglobin electrophoresis        NIPT        MSAFP-4        AFP (for  NTD only)                  External Prenatal Results     Pregnancy Outside Results - Transcribed From Office Records - See Scanned Records For Details     Test Value Date Time    Hgb 11.5 g/dL 19 1207    Hct 33.9 % 19 1207    ABO O  18 1054    Rh Positive  18 1054    Antibody Screen Negative  18 1054    Glucose Fasting GTT       Glucose Tolerance Test 1 hour       Glucose Tolerance Test 3 hour       Gonorrhea (discrete)       Chlamydia (discrete)       RPR Non-Reactive  18 1054    VDRL       Syphilis Antibody       Rubella 236.0 IU/mL 18 1054      Immune  18 1054    HBsAg Negative  18 1054    Herpes Simplex Virus PCR       Herpes Simplex VIrus Culture       HIV Negative  18 1054    Hep C RNA Quant PCR       Hep C Antibody Negative  18 1054    AFP       Group B Strep Negative  19 0905    GBS Susceptibility to Clindamycin       GBS Susceptibility to Erythromycin       Fetal Fibronectin       Genetic Testing, Maternal Blood             Drug Screening     Test Value Date Time    Urine Drug Screen       Amphetamine Screen Negative  18 1054    Barbiturate Screen Negative  18 1054    Benzodiazepine Screen Negative  18 1054    Methadone Screen Negative  18 1054    Phencyclidine Screen       Opiates Screen Negative  18 1054    THC Screen Negative  18 1054    Cocaine Screen       Propoxyphene Screen       Buprenorphine Screen       Methamphetamine Screen       Oxycodone Screen Negative  18 1054    Tricyclic Antidepressants Screen                    Past OB History:     Obstetric History       T0      L0     SAB1   TAB0   Ectopic0   Molar0   Multiple0   Live Births0       # Outcome Date GA Lbr Leland/2nd Weight Sex Delivery Anes PTL Lv   2 Current            2012                   ALLERGIES:     Allergies   Allergen Reactions   • Lorabid [Loracarbef] Rash        Home Medications:     Prior to  Admission medications    Medication Sig Start Date End Date Taking? Authorizing Provider   citalopram (CeleXA) 40 MG tablet Take 1 tablet by mouth Daily. 1/3/19  Yes Favio Sims MD   ferrous sulfate 325 (65 FE) MG tablet Take 1 tablet by mouth 3 (Three) Times a Day With Meals. 18  Yes Favio Sims MD   Prenatal Multivit-Min-Fe-FA (PRE-NERY PO) Take  by mouth Daily.   Yes Emergency, Nurse Epic, RN   busPIRone (BUSPAR) 10 MG tablet Take 0.5 tablets by mouth 3 (Three) Times a Day. 18   Cherelle Aquino APRN   hydrOXYzine (VISTARIL) 25 MG capsule Take 1 capsule by mouth 3 (Three) Times a Day As Needed for Itching. 18   Cherelle Aquino APRN       Past Medical History: Past Medical History:   Diagnosis Date   • Acute sinusitis    • Agoraphobia with panic attacks    • Chronic depression    • Corneal abrasion    • Dysfunction of eustachian tube    • Encounter for general counseling on prescription of oral contraceptives    • Generalized anxiety disorder    • Hemangioma     mediastinal cavernous [id number] stable      • Herpes simplex    • Iron deficiency anemia    • Laryngitis    • Malaise and fatigue    • Moderate Vaginal discharge     reports frequent yeast infections      • Otitis media    • Ovarian cyst    • PONV (postoperative nausea and vomiting)    • Streptococcal pharyngitis    • Syncope    • Tracheitis    • Verruca plantaris       Past Surgical History Past Surgical History:   Procedure Laterality Date   • KNEE ARTHROSCOPY  2009    x2   • MEDIASTINOSCOPY  2011    Mediastinoscopy. Cavernous hemangioma. Right paratracheal mass.   • WISDOM TOOTH EXTRACTION        Family History: Family History   Problem Relation Age of Onset   • Cancer Other    • Diabetes Other    • Heart disease Other    • Hypertension Other    • Hypertension Paternal Grandmother    • Diabetes Paternal Grandmother    • Breast cancer Paternal Grandmother       Social History:  reports that  has never smoked. she has  never used smokeless tobacco.   reports that she does not drink alcohol.   reports that she does not use drugs.        Review of systems: CNS denies headaches, eyes denies visual changes, otologic denies otologic changes, respiratory denies shortness of breath, cardiovascular only expected cardiovascular changes in pregnancy, GI no right upper quadrant pain, urinary no hematuria, musculoskeletal no joint swelling, endocrine no polydipsia polyuria psychiatric denies any prior psychiatric diagnosis     Objective       Vital Signs Range for the last 24 hours  Temperature: Temp:  [98 °F (36.7 °C)] 98 °F (36.7 °C)   Temp Source: Temp src: Oral   BP: BP: (114-137)/(74-92) 130/84   Pulse: Heart Rate:  [] 96   Respirations: Resp:  [18] 18   SPO2: SpO2:  [99 %] 99 %   O2 Amount (l/min):     O2 Devices Device (Oxygen Therapy): room air   Weight: Weight:  [72.1 kg (159 lb)] 72.1 kg (159 lb)       OBGyn Exam       Assessment/Plan           Assessment and Plan  1. Intrauterine pregnancy at  34-1/7 weeks elevated blood pressures but generally they been good do not feel preeclampsia based on urine protein creatinine ratio do feel we need a full 24 hour urine I think it's okay to collected home is her blood pressures in generally good.  Patient seen as outpatient follow-up in 1-2 days returned any problems that may be developing gestational hypertension.    GBS status: Results in Past 90 Days  Result Component Current Result Ref Range Previous Result Ref Range   Group B Strep, DNA Negative (1/3/2019) Negative Not in Time Range             This document has been electronically signed by Hakan Wing MD on January 6, 2019 1:52 PM

## 2019-01-06 NOTE — NON STRESS TEST
Marychuy Aburto, a  at 34w1d with an NICKIE of 2019, by Last Menstrual Period, was seen at [facility] for a nonstress test.    Chief Complaint   Patient presents with   • Elevated Blood Pressure     reports good fetal movement, no other c/o       Patient Active Problem List   Diagnosis   • Insomnia   • Anxiety   • Depression   • Malaise   • Menometrorrhagia   • Surveillance of contraceptive pill   • General medical exam   • Epigastric pain   • Cavernous hemangioma mediastinum   • Other acne   • Pelvic pain   • Positive pregnancy test   • Gastroesophageal reflux disease without esophagitis   • Sciatica of left side   • Acute left-sided low back pain with left-sided sciatica   • Anemia during pregnancy in third trimester   • Gestational hypertension, third trimester       Start Time: 1315  Stop Time:  134    Interpretation A  Nonstress Test Interpretation A: Reactive (19 1345 : Ana Laura Mahmood, RN)  Comments A: Strip reviewed with RICHIE Monet RN (19 1345 : Ana Laura Mahmood, RN)        Labs drawn u/a sent all WNL, BPP=8, EFW 4lb 15oz, pt sent home with 24 hour collection

## 2019-01-06 NOTE — DISCHARGE INSTRUCTIONS
Return to labor and delivery for regular contractions every 2-3 mins for 2 hours, if your water breaks, vaginal bleeding, decreased fetal movement.  Keep next scheduled appt and call [phone] for any concerns or problems.        Report or return for headache not relieved with tylenol, visual disturbances, swelling,  pain in upper abdomen (not heartburn).  Decreased fetal movement, water breaks or vaginal bleeding.

## 2019-01-06 NOTE — NURSING NOTE
Very small trace of edema noted only to lower extremities, no edema noted in upper extremities or face.  Bilateral reflexes 2+ with no clonus noted.

## 2019-01-07 ENCOUNTER — HOSPITAL ENCOUNTER (OUTPATIENT)
Dept: PHYSICAL THERAPY | Facility: HOSPITAL | Age: 24
Setting detail: THERAPIES SERIES
Discharge: HOME | End: 2019-01-07

## 2019-01-07 ENCOUNTER — LAB (OUTPATIENT)
Dept: LAB | Facility: HOSPITAL | Age: 24
End: 2019-01-07

## 2019-01-07 ENCOUNTER — TELEPHONE (OUTPATIENT)
Dept: OBSTETRICS AND GYNECOLOGY | Facility: CLINIC | Age: 24
End: 2019-01-07

## 2019-01-07 ENCOUNTER — ROUTINE PRENATAL (OUTPATIENT)
Dept: OBSTETRICS AND GYNECOLOGY | Facility: CLINIC | Age: 24
End: 2019-01-07

## 2019-01-07 VITALS — WEIGHT: 162.6 LBS | DIASTOLIC BLOOD PRESSURE: 70 MMHG | SYSTOLIC BLOOD PRESSURE: 110 MMHG | BODY MASS INDEX: 27.06 KG/M2

## 2019-01-07 DIAGNOSIS — O13.3 GESTATIONAL HYPERTENSION WITHOUT SIGNIFICANT PROTEINURIA DURING PREGNANCY IN THIRD TRIMESTER, ANTEPARTUM: ICD-10-CM

## 2019-01-07 DIAGNOSIS — O26.899 PELVIC PAIN IN PREGNANCY: Primary | ICD-10-CM

## 2019-01-07 DIAGNOSIS — R10.2 PELVIC PAIN IN PREGNANCY: Primary | ICD-10-CM

## 2019-01-07 DIAGNOSIS — M54.9 BACK PAIN IN PREGNANCY: ICD-10-CM

## 2019-01-07 DIAGNOSIS — O99.891 BACK PAIN IN PREGNANCY: ICD-10-CM

## 2019-01-07 DIAGNOSIS — O16.9 HIGH BLOOD PRESSURE AFFECTING PREGNANCY, ANTEPARTUM: ICD-10-CM

## 2019-01-07 DIAGNOSIS — Z3A.34 34 WEEKS GESTATION OF PREGNANCY: Primary | ICD-10-CM

## 2019-01-07 PROBLEM — O09.899 HISTORY OF PRETERM DELIVERY, CURRENTLY PREGNANT: Status: ACTIVE | Noted: 2019-01-07

## 2019-01-07 PROBLEM — O36.5990 IUGR (INTRAUTERINE GROWTH RESTRICTION) AFFECTING CARE OF MOTHER: Status: ACTIVE | Noted: 2019-01-07

## 2019-01-07 LAB
COLLECT DURATION TIME UR: 24 HRS
COLLECT DURATION TIME UR: 24 HRS
CREAT CL 24H UR+SERPL-VRATE: 107.6 ML/MIN (ref 81–134)
CREAT CL 24H UR+SERPL-VRATE: 154.9 L/24 HR
CREAT UR-MCNC: 213 MG/DL
CREATINE 24H UR-MRATE: 1.22 G/24 HR (ref 0.8–2.8)
PROT 24H UR-MRATE: 55.2 MG/24HOURS (ref 0–230)
PROT UR-MCNC: 9.6 MG/DL
SPECIMEN VOL 24H UR: 575 ML

## 2019-01-07 PROCEDURE — 81050 URINALYSIS VOLUME MEASURE: CPT

## 2019-01-07 PROCEDURE — 99213 OFFICE O/P EST LOW 20 MIN: CPT | Performed by: OBSTETRICS & GYNECOLOGY

## 2019-01-07 PROCEDURE — 82575 CREATININE CLEARANCE TEST: CPT

## 2019-01-07 PROCEDURE — 84156 ASSAY OF PROTEIN URINE: CPT

## 2019-01-07 PROCEDURE — 97110 THERAPEUTIC EXERCISES: CPT | Performed by: PHYSICAL THERAPIST

## 2019-01-07 NOTE — THERAPY RE-EVALUATION
Outpatient Physical Therapy Women's Health Re-Evaluation  [facility]     Patient Name: Marychuy Aburto  : 1995  MRN: [id number]  Today's Date: 2019        Visit Date: 2019   Visit Number:   Recheck: 19  % improvement: 75-85%  MD FU: next week    Patient Active Problem List   Diagnosis   • Insomnia   • Anxiety   • Depression   • Malaise   • Menometrorrhagia   • Surveillance of contraceptive pill   • General medical exam   • Epigastric pain   • Cavernous hemangioma mediastinum   • Other acne   • Pelvic pain   • Positive pregnancy test   • Gastroesophageal reflux disease without esophagitis   • Sciatica of left side   • Acute left-sided low back pain with left-sided sciatica   • Anemia during pregnancy in third trimester   • Gestational hypertension, third trimester   • IUGR (intrauterine growth restriction) affecting care of mother   • History of  delivery, currently pregnant        Past Medical History:   Diagnosis Date   • Acute sinusitis    • Agoraphobia with panic attacks    • Chronic depression    • Corneal abrasion    • Dysfunction of eustachian tube    • Encounter for general counseling on prescription of oral contraceptives    • Generalized anxiety disorder    • Hemangioma     mediastinal cavernous [id number] stable      • Herpes simplex    • Iron deficiency anemia    • Laryngitis    • Malaise and fatigue    • Moderate Vaginal discharge     reports frequent yeast infections      • Otitis media    • Ovarian cyst    • PONV (postoperative nausea and vomiting)    • Streptococcal pharyngitis    • Syncope    • Tracheitis    • Verruca plantaris         Past Surgical History:   Procedure Laterality Date   • KNEE ARTHROSCOPY  2009    x2   • MEDIASTINOSCOPY  2011    Mediastinoscopy. Cavernous hemangioma. Right paratracheal mass.   • WISDOM TOOTH EXTRACTION           Visit Dx:    ICD-10-CM ICD-9-CM   1. Pelvic pain in pregnancy O26.899 646.80    R10.2 625.9   2. Back pain in  pregnancy O99.89 646.80    M54.9 724.5           Women's Health     Row Name 01/07/19 1600             Pregnancy Questions    Number of Pregnancies  2  -SW      Number of Children  0  -SW      How many weeks pregnant are you?  34 weeks  -SW      Due Date  02/16/19  -SW      Do you have radicular pain or numbness?  No  -SW        User Key  (r) = Recorded By, (t) = Taken By, (c) = Cosigned By    Initials Name Provider Type    Loan Friedman, PT Physical Therapist        PT Ortho     Row Name 01/07/19 1700       Posture/Observations    Posture- WNL  Posture is WNL  -SW    Posture/Observations Comments  Good spirits.  No distress noted. Ambulates without discomfort.  Alignment level this date in supine.  AP glies to thoracic good without audible pop.  No tenderness reported with palpation.   -SW       Quarter Clearing    Quarter Clearing  Lower Quarter Clearing  -SW       DTR- Lower Quarter Clearing    Patellar tendon (L2-4)  2- Normal response  -SW    Achilles tendon (S1-2)  2- Normal response  -SW       Neural Tension Signs- Lower Quarter Clearing    Slump  Negative  -SW    SLR  Negative  -SW    Prone knee flexion  Negative  -SW       Sensory Screen for Light Touch- Lower Quarter Clearing    L1 (inguinal area)  Intact  -SW    L2 (anterior mid thigh)  Intact  -SW    L3 (distal anterior thigh)  Intact  -SW    L4 (medial lower leg/foot)  Intact  -SW    L5 (lateral lower leg/great toe)  Intact  -SW    S1 (bottom of foot)  Intact  -SW       Myotomal Screen- Lower Quarter Clearing    Hip flexion (L2)  5 (Normal)  -SW    Knee extension (L3)  5 (Normal)  -SW    Ankle DF (L4)  5 (Normal)  -SW    Great toe extension (L5)  WNL  -SW    Ankle PF (S1)  5 (Normal)  -SW    Knee flexion (S2)  5 (Normal)  -SW       Lumbar ROM Screen- Lower Quarter Clearing    Lumbar Flexion  Normal  -SW    Lumbar Extension  Normal  -SW    Lumbar Lateral Flexion  Normal  -SW    Lumbar Rotation  Normal  -SW       SI/Hip Screen- Lower Quarter  Clearing    ASIS compression  Negative  -SW    ASIS distraction  Negative  -SW    Michelle's/Austin's test  Negative  -SW    Posterior thigh sheer  Negative  -SW    Pain in Senia's area  Negative  -SW       Special Tests/Palpation    Special Tests/Palpation  Lumbar/SI  -SW       Lumbosacral Accessory Motions    Lumbosacral Accessory Motions Tested?  Yes all segments with good AP mobility   -SW    PA glide- Sacral base  -- level with good AP glide.  No pain with palpation  -SW    Innominate rotation  -- alignment level at innominates  -SW       Lumbosacral Palpation    Lumbosacral Palpation?  No Tenderness/Abnormality no triggering noted this visit  -    Row Name 01/07/19 1600       Subjective Comments    Subjective Comments  Passed out and vomiting on Saturday morning and sunday.  No dehydration to her knowledge.  Observation unit for HTN.  144/104.  Concerns with gestational HTN.  Comes back on Thursday for NST.  24 hour urine test also completed.  Has been here all day.  Baby is measuring big.  Already 5#.  Already practicing breathing on his own.  Good movement.  Started to go back to chiropractor due to upper back pain.  Started going to [employer].  No hip pain or upper back pain.  Feels that she is managing back well.  It is all the other things that need managing.  BP this date has been good at 113/70.  Feels like she is managing well and desires to just call if she needs something from this point.   -SW       Subjective Pain    Able to rate subjective pain?  yes  -SW    Pre-Treatment Pain Level  0  -SW    Post-Treatment Pain Level  0  -SW      User Key  (r) = Recorded By, (t) = Taken By, (c) = Cosigned By    Initials Name Provider Type    Loan Friedman, PT Physical Therapist                     PT Assessment/Plan     Row Name 01/07/19 1600          PT Assessment    Functional Limitations  Performance in leisure activities;Performance in self-care ADL;Performance in work activities;Performance in sport  activities  -SW     Impairments  Impaired muscle length;Impaired muscle power;Impaired postural alignment;Joint mobility;Muscle strength;Pain;Poor body mechanics;Posture  -SW     Assessment Comments  Patient is managing well with s/s.  Alignment good this date.  She is reporting compliance to HEP program.  Sees chiropractic intermittently and feels she is doing good. Concerns with elevation of BP but finds it to be intermittent and not constant.  Patient desires to call as needed for FU as she feels she is doing well at this time.   -SW     Rehab Potential  Good  -SW     Patient/caregiver participated in establishment of treatment plan and goals  Yes  -SW     Patient would benefit from skilled therapy intervention  Yes  -SW        PT Plan    PT Frequency  -- PRN per the patient request  -     PT Plan Comments  Patient to phone with complaints as needed over the next 4 weeks.  Continue HEP as assigned.    -       User Key  (r) = Recorded By, (t) = Taken By, (c) = Cosigned By    Initials Name Provider Type    Loan Friedman, PT Physical Therapist            Exercises     Row Name 01/07/19 1600             Subjective Comments    Subjective Comments  Passed out and vomiting on Saturday morning and sunday.  No dehydration to her knowledge.  Observation unit for HTN.  144/104.  Concerns with gestational HTN.  Comes back on Thursday for NST.  24 hour urine test also completed.  Has been here all day.  Baby is measuring big.  Already 5#.  Already practicing breathing on his own.  Good movement.  Started to go back to chiropractor due to upper back pain.  Started going to [employer].  No hip pain or upper back pain.  Feels that she is managing back well.  It is all the other things that need managing.  BP this date has been good at 113/70.  Feels like she is managing well and desires to just call if she needs something from this point.   -SW         Subjective Pain    Able to rate subjective pain?  yes  -SW       Pre-Treatment Pain Level  0  -SW      Post-Treatment Pain Level  0  -SW         Exercise 1    Exercise Name 1  Reviewed only HEP  -         Exercise 2    Exercise Name 2  Manual alignment check  -        User Key  (r) = Recorded By, (t) = Taken By, (c) = Cosigned By    Initials Name Provider Type    Loan Friedman, PT Physical Therapist                      PT OP Goals     Row Name 01/07/19 1600          PT Short Term Goals    STG Date to Achieve  --  -     STG 1  patient to be independent in [facility] for stretching and mobility exercises.  -     STG 1 Progress  Met  -     STG 2  patient to present with improved alignment to spine in supine position to allow no necessity of MET for alignment.  -     STG 2 Progress  Met  -     STG 3  patient to present with inc tolerance to gait activities with school class using appropriate shoe attire and pain no greater than 2/10 with gait.   -     STG 3 Progress  Met  -     STG 4  patient to demo proper transfers with log roll and neutral spine mechanics without verbal cues required.  -     STG 4 Progress  Met  -     STG 5  patient to present with improved pain post end of day to mild/mod vs severe 10/10 reported with evaluation   -     STG 5 Progress  Met  -        Long Term Goals    LTG Date to Achieve  02/15/19  -     LTG 1  subjectively improve 75% overall with activity and function  -     LTG 1 Progress  Met  -     LTG 2  patient to continue work activities without restrictions or missed days of work due to pain  -     LTG 2 Progress  Met  -     LTG 3  neg provacative tests for SI dysfunction  -     LTG 3 Progress  Met  -     LTG 4  pain to rate as low-mild post activity vs severe after days activity.  -     LTG 4 Progress  Met;Progressing  -        Time Calculation    PT Goal Re-Cert Due Date  02/07/19  -       User Key  (r) = Recorded By, (t) = Taken By, (c) = Cosigned By    Initials Name Provider Type    Loan Friedman  Benigno, PT Physical Therapist          Therapy Education  Given: HEP  Program: Reinforced  How Provided: Verbal  Provided to: Patient  Level of Understanding: Teach back education performed, Verbalized               Time Calculation:   Start Time: 1615  Stop Time: 1645  Time Calculation (min): 30 min  Therapy Suggested Charges     Code   Minutes Charges    None           Therapy Charges for Today     Code Description Service Date Service Provider Modifiers Qty    16388670838 HC PT THER PROC EA 15 MIN 1/7/2019 Loan Valderrama, PT GP 2                  Loan Valderrama, PT  1/7/2019

## 2019-01-07 NOTE — TELEPHONE ENCOUNTER
----- Message from Cathie Simms CNA sent at 1/7/2019  8:35 AM CST -----  Where should I put this patient?    ----- Message -----  From: [employer]  Sent: 1/6/2019   2:08 PM  To: [facility]  Pool    Needs to be seen Monday 1/7 after 11 a.m. With Dr. Wing or Dr. Sims, please call patient    Called and spoke with the pt and told her that Dr. Wing wants to see her after she drops off her 24 hour urine sample.  The pt verbalized understanding and I spoke with Cathie at the  and had her put on the schedule for today per Dr. Wing.

## 2019-01-10 ENCOUNTER — ROUTINE PRENATAL (OUTPATIENT)
Dept: OBSTETRICS AND GYNECOLOGY | Facility: CLINIC | Age: 24
End: 2019-01-10

## 2019-01-10 VITALS — WEIGHT: 167 LBS | SYSTOLIC BLOOD PRESSURE: 110 MMHG | DIASTOLIC BLOOD PRESSURE: 78 MMHG | BODY MASS INDEX: 27.79 KG/M2

## 2019-01-10 DIAGNOSIS — Z3A.34 34 WEEKS GESTATION OF PREGNANCY: ICD-10-CM

## 2019-01-10 DIAGNOSIS — O13.3 GESTATIONAL HYPERTENSION, THIRD TRIMESTER: Primary | ICD-10-CM

## 2019-01-10 PROCEDURE — 99213 OFFICE O/P EST LOW 20 MIN: CPT | Performed by: OBSTETRICS & GYNECOLOGY

## 2019-01-10 PROCEDURE — 59025 FETAL NON-STRESS TEST: CPT | Performed by: OBSTETRICS & GYNECOLOGY

## 2019-01-13 PROBLEM — Z3A.34 34 WEEKS GESTATION OF PREGNANCY: Status: ACTIVE | Noted: 2019-01-13

## 2019-01-13 NOTE — PROGRESS NOTES
Chief complaint here for prenatal care  1 gestational hypertension patient was seen at the hospital  felt have gestational hypertension denies headaches visual changes or epigastric pain ,

## 2019-01-14 NOTE — PROGRESS NOTES
Chief complaint here for prenatal care #1 gestational retention denies headaches visual changes or epigastric pain.

## 2019-01-15 ENCOUNTER — ROUTINE PRENATAL (OUTPATIENT)
Dept: OBSTETRICS AND GYNECOLOGY | Facility: CLINIC | Age: 24
End: 2019-01-15

## 2019-01-15 VITALS — SYSTOLIC BLOOD PRESSURE: 120 MMHG | DIASTOLIC BLOOD PRESSURE: 83 MMHG | WEIGHT: 163.8 LBS | BODY MASS INDEX: 27.26 KG/M2

## 2019-01-15 DIAGNOSIS — Z3A.35 35 WEEKS GESTATION OF PREGNANCY: Primary | ICD-10-CM

## 2019-01-15 PROCEDURE — 99213 OFFICE O/P EST LOW 20 MIN: CPT | Performed by: OBSTETRICS & GYNECOLOGY

## 2019-01-15 NOTE — PROGRESS NOTES
Chief Complaint   Patient presents with   • OB Follow up   • High Risk Gestation     Marychuy Aburto is a 22 y.o. year old .  Patient's last menstrual period was 2018 (exact date).      She has a history of one previous miscarriage in .  She has a history of hemangiomas.  One is on the liver.     She lives in [place]. 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    Smoking status: Never Smoker                                                             Smokeless tobacco: Never Used                            Imaging   Transvaginal ultrasound performed 2018 shows single intrauterine pregnancy consistent with 5 weeks and 5 days with fetal heart rate 99 bpm and NICKIE by ultrasound 2019.     Limited ultrasound performed 2018 because of inability to hear heart tones shows single intrauterine pregnancy with fetal heart rate 166 bpm and crown-rump length consistent with 10 weeks and 6 days.     GERD is much improved.     She is having a boy named Deanna.     She declines genetic testing.     Ultrasound 2018 shows single intrauterine pregnancy consistent with 21 weeks and 4 days.  No abnormalities noted.  Three-vessel umbilical cord.  Fetal heart rate 149 bpm.  Cervix 4.4 cm long.  [facility] recommended repeat ultrasound for fetal spine in 8-10 weeks.     The patient complains of the following: Left sciatica.  We'll schedule physical therapy with [facility].     2018- MONICA visit today; feeling well, no complaints. Has had one appt with [facility] for PT and states that she is doing her recommended stretches at home. She is still having some sciatic pain on the left side but is managing that better now. Still taking prilosec for heartburn and feels that it is well controlled. Denies dysuria, vb, LOF, constipation or headaches. Needs 1 hour glucose test in 3 weeks; drink and instructions given. F/U for MONICA visit in 3  weeks or sooner PRN.     2018 1 hour Glucola 146.  Hemoglobin 11.2 and hematocrit 32.9 with MCV 91.4.  We'll place on iron.  Schedule 3 hour GTT.  She has restless leg syndrome that 2 Tylenol at bedtime seemed to help.  Follow-up ultrasound in 2 weeks.     2018 3 hour glucose test normal at 85/140/150/115.  Ultrasound shows single intrauterine pregnancy in the vertex position.  Fetal heart rate 141 bpm.  Estimated fetal weight 3 lbs. 9 oz. or 54th percentile.  Three-vessel umbilical cord.  HOMERO normal at 14.8     January 3, 2019 and a has a long history of depression and anxiety.  Anxiety is getting significantly worse now.  I'm placing her on Celexa 20 mg a day.  Also recommended vitamin D3 and B12 supplementation.  GBS swab performed today.    GBS negative.    January 15, 2019 ultrasound shows single intrauterine pregnancy in the cephalic position HOMERO 14.88 biophysical profile 8 out of 8 blood pressures are normal.  Her 24-hour urine protein on 2019 was normal at 55.2.    Obstetric History  #: 1, Date: , Sex: None, Weight: None, GA: None, Delivery: None, Apgar1: None, Apgar5: None, Living: None, Birth Comments: None    #: 2, Date: None, Sex: None, Weight: None, GA: None, Delivery: None, Apgar1: None, Apgar5: None, Living: None, Birth Comments: None      The patient complains of the following: No new complaints    ROS  Headache: No   Visual changes: No   Swelling in legs: No   Nausea: No   Constipation: No   Diarrhea: No   Contractions: No   Leaking fluid: No   Vaginal bleeding: No   Other:      Specific topics discussed at today's visit: fetal kick counts and  labor precautions  Tests done today: BPP - 8 / 8  Tests to be done at the next visit: none    Marychuy was seen today for ob follow up and high risk gestation.    Diagnoses and all orders for this visit:    35 weeks gestation of pregnancy

## 2019-01-22 ENCOUNTER — ROUTINE PRENATAL (OUTPATIENT)
Dept: OBSTETRICS AND GYNECOLOGY | Facility: CLINIC | Age: 24
End: 2019-01-22

## 2019-01-22 VITALS — DIASTOLIC BLOOD PRESSURE: 72 MMHG | BODY MASS INDEX: 27.12 KG/M2 | SYSTOLIC BLOOD PRESSURE: 141 MMHG | WEIGHT: 163 LBS

## 2019-01-22 DIAGNOSIS — O99.013 ANEMIA DURING PREGNANCY IN THIRD TRIMESTER: ICD-10-CM

## 2019-01-22 DIAGNOSIS — F41.9 ANXIETY IN PREGNANCY IN THIRD TRIMESTER, ANTEPARTUM: ICD-10-CM

## 2019-01-22 DIAGNOSIS — O99.343 ANXIETY IN PREGNANCY IN THIRD TRIMESTER, ANTEPARTUM: ICD-10-CM

## 2019-01-22 DIAGNOSIS — Z3A.36 36 WEEKS GESTATION OF PREGNANCY: Primary | ICD-10-CM

## 2019-01-22 PROCEDURE — 99213 OFFICE O/P EST LOW 20 MIN: CPT | Performed by: OBSTETRICS & GYNECOLOGY

## 2019-01-23 NOTE — PROGRESS NOTES
Chief Complaint   Patient presents with   • High Risk Gestation     Marychuy Aburto is a 22 y.o. year old .  Patient's last menstrual period was 2018 (exact date).      She has a history of one previous miscarriage in .  She has a history of hemangiomas.  One is on the liver.     She lives in [place]. 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     Smoking status: Never Smoker                                                             Smokeless tobacco: Never Used                            Imaging   Transvaginal ultrasound performed 2018 shows single intrauterine pregnancy consistent with 5 weeks and 5 days with fetal heart rate 99 bpm and NICKIE by ultrasound 2019.     Limited ultrasound performed 2018 because of inability to hear heart tones shows single intrauterine pregnancy with fetal heart rate 166 bpm and crown-rump length consistent with 10 weeks and 6 days.     GERD is much improved.     She is having a boy named Deanna.     She declines genetic testing.     Ultrasound 2018 shows single intrauterine pregnancy consistent with 21 weeks and 4 days.  No abnormalities noted.  Three-vessel umbilical cord.  Fetal heart rate 149 bpm.  Cervix 4.4 cm long.  [facility] recommended repeat ultrasound for fetal spine in 8-10 weeks.     The patient complains of the following: Left sciatica.  We'll schedule physical therapy with [facility].     2018- MONICA visit today; feeling well, no complaints. Has had one appt with [facility] for PT and states that she is doing her recommended stretches at home. She is still having some sciatic pain on the left side but is managing that better now. Still taking prilosec for heartburn and feels that it is well controlled. Denies dysuria, vb, LOF, constipation or headaches. Needs 1 hour glucose test in 3 weeks; drink and instructions given. F/U for MONICA visit in 3 weeks  or sooner PRN.     2018 1 hour Glucola 146.  Hemoglobin 11.2 and hematocrit 32.9 with MCV 91.4.  We'll place on iron.  Schedule 3 hour GTT.  She has restless leg syndrome that 2 Tylenol at bedtime seemed to help.  Follow-up ultrasound in 2 weeks.     2018 3 hour glucose test normal at 85/140/150/115.  Ultrasound shows single intrauterine pregnancy in the vertex position.  Fetal heart rate 141 bpm.  Estimated fetal weight 3 lbs. 9 oz. or 54th percentile.  Three-vessel umbilical cord.  HOMERO normal at 14.8     January 3, 2019 and a has a long history of depression and anxiety.  Anxiety is getting significantly worse now.  I'm placing her on Celexa 20 mg a day.  Also recommended vitamin D3 and B12 supplementation.  GBS swab performed today.     GBS negative.     January 15, 2019 ultrasound shows single intrauterine pregnancy in the cephalic position HOMERO 14.88 biophysical profile 8 out of 8 blood pressures are normal.  Her 24-hour urine protein on 2019 was normal at 55.2.    2019, no new complaints.  Fetal kick counts and  labor precautions.    Obstetric History  #: 1, Date: , Sex: None, Weight: None, GA: None, Delivery: None, Apgar1: None, Apgar5: None, Living: None, Birth Comments: None    #: 2, Date: None, Sex: None, Weight: None, GA: None, Delivery: None, Apgar1: None, Apgar5: None, Living: None, Birth Comments: None      The patient complains of the following: No new complaints    ROS  Headache: No   Visual changes: No   Swelling in legs: No   Nausea: No   Constipation: No   Diarrhea: No   Contractions: No   Leaking fluid: No   Vaginal bleeding: No   Other:      Specific topics discussed at today's visit: fetal kick counts and labor precautions  Tests done today: none  Tests to be done at the next visit: lisa Perrin was seen today for high risk gestation.    Diagnoses and all orders for this visit:    36 weeks gestation of pregnancy    Anemia during  pregnancy in third trimester    Anxiety in pregnancy in third trimester, antepartum

## 2019-01-29 ENCOUNTER — ROUTINE PRENATAL (OUTPATIENT)
Dept: OBSTETRICS AND GYNECOLOGY | Facility: CLINIC | Age: 24
End: 2019-01-29

## 2019-01-29 VITALS — DIASTOLIC BLOOD PRESSURE: 90 MMHG | WEIGHT: 164 LBS | SYSTOLIC BLOOD PRESSURE: 143 MMHG | BODY MASS INDEX: 27.29 KG/M2

## 2019-01-29 DIAGNOSIS — O99.013 ANEMIA DURING PREGNANCY IN THIRD TRIMESTER: ICD-10-CM

## 2019-01-29 DIAGNOSIS — F41.9 ANXIETY IN PREGNANCY IN THIRD TRIMESTER, ANTEPARTUM: ICD-10-CM

## 2019-01-29 DIAGNOSIS — O99.343 ANXIETY IN PREGNANCY IN THIRD TRIMESTER, ANTEPARTUM: ICD-10-CM

## 2019-01-29 DIAGNOSIS — O13.3 GESTATIONAL HYPERTENSION WITHOUT SIGNIFICANT PROTEINURIA DURING PREGNANCY IN THIRD TRIMESTER, ANTEPARTUM: ICD-10-CM

## 2019-01-29 DIAGNOSIS — Z3A.37 37 WEEKS GESTATION OF PREGNANCY: Primary | ICD-10-CM

## 2019-01-29 PROCEDURE — 99213 OFFICE O/P EST LOW 20 MIN: CPT | Performed by: OBSTETRICS & GYNECOLOGY

## 2019-01-29 RX ORDER — CARBOPROST TROMETHAMINE 250 UG/ML
250 INJECTION, SOLUTION INTRAMUSCULAR AS NEEDED
Status: CANCELLED | OUTPATIENT
Start: 2019-01-29

## 2019-01-29 RX ORDER — METHYLERGONOVINE MALEATE 0.2 MG/ML
200 INJECTION INTRAVENOUS ONCE AS NEEDED
Status: CANCELLED | OUTPATIENT
Start: 2019-01-29

## 2019-01-29 RX ORDER — SODIUM CHLORIDE, SODIUM LACTATE, POTASSIUM CHLORIDE, CALCIUM CHLORIDE 600; 310; 30; 20 MG/100ML; MG/100ML; MG/100ML; MG/100ML
125 INJECTION, SOLUTION INTRAVENOUS CONTINUOUS
Status: CANCELLED | OUTPATIENT
Start: 2019-01-29

## 2019-01-29 RX ORDER — OXYTOCIN/0.9 % SODIUM CHLORIDE 30/500 ML
2-30 PLASTIC BAG, INJECTION (ML) INTRAVENOUS
Status: CANCELLED | OUTPATIENT
Start: 2019-01-29

## 2019-01-29 RX ORDER — MISOPROSTOL 100 UG/1
25 TABLET ORAL ONCE
Status: CANCELLED | OUTPATIENT
Start: 2019-01-29 | End: 2019-01-29

## 2019-01-29 RX ORDER — SODIUM CHLORIDE 0.9 % (FLUSH) 0.9 %
3-10 SYRINGE (ML) INJECTION AS NEEDED
Status: CANCELLED | OUTPATIENT
Start: 2019-01-29

## 2019-01-29 RX ORDER — MISOPROSTOL 100 UG/1
800 TABLET ORAL AS NEEDED
Status: CANCELLED | OUTPATIENT
Start: 2019-01-29

## 2019-01-29 RX ORDER — LIDOCAINE HYDROCHLORIDE 10 MG/ML
5 INJECTION, SOLUTION EPIDURAL; INFILTRATION; INTRACAUDAL; PERINEURAL AS NEEDED
Status: CANCELLED | OUTPATIENT
Start: 2019-01-29

## 2019-01-29 RX ORDER — SODIUM CHLORIDE 0.9 % (FLUSH) 0.9 %
3 SYRINGE (ML) INJECTION EVERY 12 HOURS SCHEDULED
Status: CANCELLED | OUTPATIENT
Start: 2019-01-29

## 2019-01-30 NOTE — H&P
Obstetric History and Physical    Chief Complaint   Patient presents with   • High Risk Gestation     Marychuy Aburto is a 22 y.o. year old .  Patient's last menstrual period was 2018 (exact date).      She has a history of one previous miscarriage in .  She has a history of hemangiomas.  One is on the liver.     She lives in [place]. 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     Smoking status: Never Smoker                                                             Smokeless tobacco: Never Used                            Imaging   Transvaginal ultrasound performed 2018 shows single intrauterine pregnancy consistent with 5 weeks and 5 days with fetal heart rate 99 bpm and NICKIE by ultrasound 2019.     Limited ultrasound performed 2018 because of inability to hear heart tones shows single intrauterine pregnancy with fetal heart rate 166 bpm and crown-rump length consistent with 10 weeks and 6 days.     GERD is much improved.     She is having a boy named Deanna.     She declines genetic testing.     Ultrasound 2018 shows single intrauterine pregnancy consistent with 21 weeks and 4 days.  No abnormalities noted.  Three-vessel umbilical cord.  Fetal heart rate 149 bpm.  Cervix 4.4 cm long.  [facility] recommended repeat ultrasound for fetal spine in 8-10 weeks.     The patient complains of the following: Left sciatica.  We'll schedule physical therapy with [facility].     2018- MONICA visit today; feeling well, no complaints. Has had one appt with [facility] for PT and states that she is doing her recommended stretches at home. She is still having some sciatic pain on the left side but is managing that better now. Still taking prilosec for heartburn and feels that it is well controlled. Denies dysuria, vb, LOF, constipation or headaches. Needs 1 hour glucose test in 3 weeks; drink and instructions  given. F/U for MONICA visit in 3 weeks or sooner PRN.     2018 1 hour Glucola 146.  Hemoglobin 11.2 and hematocrit 32.9 with MCV 91.4.  We'll place on iron.  Schedule 3 hour GTT.  She has restless leg syndrome that 2 Tylenol at bedtime seemed to help.  Follow-up ultrasound in 2 weeks.     2018 3 hour glucose test normal at 85/140/150/115.  Ultrasound shows single intrauterine pregnancy in the vertex position.  Fetal heart rate 141 bpm.  Estimated fetal weight 3 lbs. 9 oz. or 54th percentile.  Three-vessel umbilical cord.  HOMERO normal at 14.8     January 3, 2019 and a has a long history of depression and anxiety.  Anxiety is getting significantly worse now.  I'm placing her on Celexa 20 mg a day.  Also recommended vitamin D3 and B12 supplementation.  GBS swab performed today.     GBS negative.     January 15, 2019 ultrasound shows single intrauterine pregnancy in the cephalic position HOMERO 14.88 biophysical profile 8 out of 8 blood pressures are normal.  Her 24-hour urine protein on 2019 was normal at 55.2.     2019, no new complaints.  Fetal kick counts and  labor precautions.    2019, 37 weeks and 3 days.  Blood pressure 143/90.  Pulse 96.  No headaches.  No visual changes.  Cervix 2/70%/-2.  Plan for labor induction on Thursday, 2019 at 0200.     Obstetric History   #: 1, Date: , Sex: None, Weight: None, GA: None, Delivery: None, Apgar1: None, Apgar5: None, Living: None, Birth Comments: None    #: 2, Date: None, Sex: None, Weight: None, GA: None, Delivery: None, Apgar1: None, Apgar5: None, Living: None, Birth Comments: None      The following portions of the patients history were reviewed and updated as appropriate: current medications, allergies, past medical history, past surgical history, past family history, past social history and problem list .       Prenatal Information:  Prenatal Results     Initial Prenatal Labs     Test Value  Reference Range Date Time    Hemoglobin 12.9 g/dL 12.0 - 15.5 g/dL 07/26/18 1054    Hematocrit 38.2 % 35.0 - 45.0 % 07/26/18 1054    Platelets 271 10*3/mm3 150 - 450 10*3/mm3 01/06/19 1207    Rubella IgG 236.0 IU/mL 0.0 - 9.9 IU/mL 07/26/18 1054      Immune  Immune 07/26/18 1054    Hepatitis B SAg Negative  Negative 07/26/18 1054    Hepatitis C Ab Negative  Negative 07/26/18 1054    RPR Non-Reactive  Non-Reactive 07/26/18 1054    ABO O   07/26/18 1054    Rh Positive   07/26/18 1054    Antibody Screen Negative   07/26/18 1054    HIV Negative  Negative 07/26/18 1054    Urine Culture No growth at 24 hours   07/26/18 1054    Gonorrhea        Chlamydia        TSH 1.150 mIU/mL 0.460 - 4.680 mIU/mL 07/26/18 0841          2nd and 3rd Trimester     Test Value Reference Range Date Time    Hemoglobin (repeated) 11.5 g/dL 12.0 - 15.5 g/dL 01/06/19 1207    Hematocrit (repeated) 33.9 % 35.0 - 45.0 % 01/06/19 1207     mg/dL 60 - 190 mg/dL 12/11/18 0848    Antibody Screen (repeated)        GTT Fasting        GTT 1 Hr        GTT 2 Hr        GTT 3 Hr        Group B Strep Negative  Negative 01/03/19 0905          Drug Screening     Test Value Reference Range Date Time    Amphetamine Screen Negative  Negative 07/26/18 1054    Barbiturate Screen Negative  Negative 07/26/18 1054    Benzodiazepine Screen Negative  Negative 07/26/18 1054    Methadone Screen Negative  Negative 07/26/18 1054    Phencyclidine Screen        Opiates Screen Negative  Negative 07/26/18 1054    THC Screen Negative  Negative 07/26/18 1054    Cocaine Screen Negative  Negative 07/26/18 1054    Propoxyphene Screen        Buprenorphine Screen        Methamphetamine Screen        Oxycodone Screen Negative  Negative 07/26/18 1054    Tricyclic Antidepressants Screen              Other (Risk screening)     Test Value Reference Range Date Time    Varicella IgG        Parvovirus IgG        CMV IgG        Cystic Fibrosis        Hemoglobin electrophoresis        NIPT         MSAFP-4        AFP (for NTD only)                  External Prenatal Results     Pregnancy Outside Results - Transcribed From Office Records - See Scanned Records For Details     Test Value Date Time    Hgb 11.5 g/dL 19 1207    Hct 33.9 % 19 1207    ABO O  18 1054    Rh Positive  18 1054    Antibody Screen Negative  18 1054    Glucose Fasting GTT       Glucose Tolerance Test 1 hour       Glucose Tolerance Test 3 hour       Gonorrhea (discrete)       Chlamydia (discrete)       RPR Non-Reactive  18 1054    VDRL       Syphilis Antibody       Rubella 236.0 IU/mL 18 1054      Immune  18 1054    HBsAg Negative  18 1054    Herpes Simplex Virus PCR       Herpes Simplex VIrus Culture       HIV Negative  18 1054    Hep C RNA Quant PCR       Hep C Antibody Negative  18 1054    AFP       Group B Strep Negative  19 0905    GBS Susceptibility to Clindamycin       GBS Susceptibility to Erythromycin       Fetal Fibronectin       Genetic Testing, Maternal Blood             Drug Screening     Test Value Date Time    Urine Drug Screen       Amphetamine Screen Negative  18 1054    Barbiturate Screen Negative  18 1054    Benzodiazepine Screen Negative  18 1054    Methadone Screen Negative  18 1054    Phencyclidine Screen       Opiates Screen Negative  18 1054    THC Screen Negative  18 1054    Cocaine Screen       Propoxyphene Screen       Buprenorphine Screen       Methamphetamine Screen       Oxycodone Screen Negative  18 1054    Tricyclic Antidepressants Screen                    Past OB History:     Obstetric History       T0      L0     SAB1   TAB0   Ectopic0   Molar0   Multiple0   Live Births0       # Outcome Date GA Lbr Leland/2nd Weight Sex Delivery Anes PTL Lv   2 Current            1 2012                   ALLERGIES:     Allergies   Allergen Reactions   • Lorabid [Loracarbef] Rash        Home  Medications:     Prior to Admission medications    Medication Sig Start Date End Date Taking? Authorizing Provider   citalopram (CeleXA) 40 MG tablet Take 1 tablet by mouth Daily. 1/3/19  Yes Favio Sims MD   Cyanocobalamin (B-12 PO) Take  by mouth.   Yes Emergency, Nurse Cathie, RN   ferrous sulfate 325 (65 FE) MG tablet Take 1 tablet by mouth 3 (Three) Times a Day With Meals. 18  Yes Favio Sims MD   loratadine (CLARITIN) 10 MG tablet Take 1 tablet by mouth Daily. 19  Yes Jd Higginbotham MD   Prenatal Multivit-Min-Fe-FA (PRE-NERY PO) Take  by mouth Daily.   Yes Emergency, Nurse Epic, RN       Past Medical History: Past Medical History:   Diagnosis Date   • Acute sinusitis    • Agoraphobia with panic attacks    • Chronic depression    • Corneal abrasion    • Dysfunction of eustachian tube    • Encounter for general counseling on prescription of oral contraceptives    • Generalized anxiety disorder    • Hemangioma     mediastinal cavernous [id number] stable      • Herpes simplex    • Iron deficiency anemia    • Laryngitis    • Malaise and fatigue    • Moderate Vaginal discharge     reports frequent yeast infections      • Otitis media    • Ovarian cyst    • PONV (postoperative nausea and vomiting)    • Streptococcal pharyngitis    • Syncope    • Tracheitis    • Verruca plantaris       Past Surgical History Past Surgical History:   Procedure Laterality Date   • KNEE ARTHROSCOPY  2009    x2   • MEDIASTINOSCOPY  2011    Mediastinoscopy. Cavernous hemangioma. Right paratracheal mass.   • WISDOM TOOTH EXTRACTION        Family History: Family History   Problem Relation Age of Onset   • Cancer Other    • Diabetes Other    • Heart disease Other    • Hypertension Other    • Hypertension Paternal Grandmother    • Diabetes Paternal Grandmother    • Breast cancer Paternal Grandmother       Social History:  reports that  has never smoked. she has never used smokeless tobacco.   reports that she does not drink  alcohol.   reports that she does not use drugs.     Review of Systems   Constitutional: Negative for activity change, appetite change, chills, diaphoresis, fatigue, fever and unexpected weight change.   Gastrointestinal: Negative for abdominal pain, constipation, diarrhea and nausea.   Genitourinary: Negative for difficulty urinating, dyspareunia, dysuria, menstrual problem, pelvic pain, urgency, vaginal bleeding, vaginal discharge and vaginal pain.   Neurological: Negative for headaches.   Psychiatric/Behavioral: Negative for dysphoric mood. The patient is not nervous/anxious.    All other systems reviewed and are negative.        Objective       Vital Signs Range for the last 24 hours  Temperature:     Temp Source:     BP: BP: (143)/(90) 143/90   Pulse:     Respirations:     SPO2:     O2 Amount (l/min):     O2 Devices     Weight: Weight:  [74.4 kg (164 lb)] 74.4 kg (164 lb)       OBGyn Exam  Physical Examination: General appearance - alert, well appearing, and in no distress and oriented to person, place, and time  Mental status - alert, oriented to person, place, and time, normal mood, behavior, speech, dress, motor activity, and thought processes  Neck - supple, no significant adenopathy  Chest - clear to auscultation, no wheezes, rales or rhonchi, symmetric air entry, no tachypnea, retractions or cyanosis  Heart - normal rate, regular rhythm, normal S1, S2, no murmurs, rubs, clicks or gallops  Abdomen - Gravid and nontender  Extremities - peripheral pulses normal, no pedal edema, no clubbing or cyanosis        Assessment:  1. Intrauterine pregnancy at 37 weeks and 5 days on January 31, 2019 with gestational hypertension and a favorable cervix.    GBS status: Results in Past 30 Days  Result Component Current Result Ref Range Previous Result Ref Range   Group B Strep, DNA Negative (1/3/2019) Negative Not in Time Range        Plan:  1. Admit to labor and delivery on January 31, 2019 for labor induction.  2. Plan  of care has been reviewed with staff and patient.  3. Risks, benefits of treatment plan have been discussed.  4. All questions have been answered.      Favio Sims MD  1/29/2019  8:48 PM

## 2019-01-30 NOTE — PROGRESS NOTES
Chief Complaint   Patient presents with   • High Risk Gestation     Marychuy Aburto is a 22 y.o. year old .  Patient's last menstrual period was 2018 (exact date).      She has a history of one previous miscarriage in .  She has a history of hemangiomas.  One is on the liver.     She lives in [place]. 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     Smoking status: Never Smoker                                                             Smokeless tobacco: Never Used                            Imaging   Transvaginal ultrasound performed 2018 shows single intrauterine pregnancy consistent with 5 weeks and 5 days with fetal heart rate 99 bpm and NICKIE by ultrasound 2019.     Limited ultrasound performed 2018 because of inability to hear heart tones shows single intrauterine pregnancy with fetal heart rate 166 bpm and crown-rump length consistent with 10 weeks and 6 days.     GERD is much improved.     She is having a boy named Deanna.     She declines genetic testing.     Ultrasound 2018 shows single intrauterine pregnancy consistent with 21 weeks and 4 days.  No abnormalities noted.  Three-vessel umbilical cord.  Fetal heart rate 149 bpm.  Cervix 4.4 cm long.  [facility] recommended repeat ultrasound for fetal spine in 8-10 weeks.     The patient complains of the following: Left sciatica.  We'll schedule physical therapy with [facility].     2018- MONICA visit today; feeling well, no complaints. Has had one appt with [facility] for PT and states that she is doing her recommended stretches at home. She is still having some sciatic pain on the left side but is managing that better now. Still taking prilosec for heartburn and feels that it is well controlled. Denies dysuria, vb, LOF, constipation or headaches. Needs 1 hour glucose test in 3 weeks; drink and instructions given. F/U for MONICA visit in 3 weeks  or sooner PRN.     2018 1 hour Glucola 146.  Hemoglobin 11.2 and hematocrit 32.9 with MCV 91.4.  We'll place on iron.  Schedule 3 hour GTT.  She has restless leg syndrome that 2 Tylenol at bedtime seemed to help.  Follow-up ultrasound in 2 weeks.     2018 3 hour glucose test normal at 85/140/150/115.  Ultrasound shows single intrauterine pregnancy in the vertex position.  Fetal heart rate 141 bpm.  Estimated fetal weight 3 lbs. 9 oz. or 54th percentile.  Three-vessel umbilical cord.  HOMERO normal at 14.8     January 3, 2019 and a has a long history of depression and anxiety.  Anxiety is getting significantly worse now.  I'm placing her on Celexa 20 mg a day.  Also recommended vitamin D3 and B12 supplementation.  GBS swab performed today.     GBS negative.     January 15, 2019 ultrasound shows single intrauterine pregnancy in the cephalic position HOMERO 14.88 biophysical profile 8 out of 8 blood pressures are normal.  Her 24-hour urine protein on 2019 was normal at 55.2.     2019, no new complaints.  Fetal kick counts and  labor precautions.    2019, 37 weeks and 3 days.  Blood pressure 143/90.  Pulse 96.  No headaches.  No visual changes.  Cervix 2/70%/-2.  Plan for labor induction on Thursday, 2019 at 0200.    Obstetric History  #: 1, Date: , Sex: None, Weight: None, GA: None, Delivery: None, Apgar1: None, Apgar5: None, Living: None, Birth Comments: None    #: 2, Date: None, Sex: None, Weight: None, GA: None, Delivery: None, Apgar1: None, Apgar5: None, Living: None, Birth Comments: None      The patient complains of the following: No new complaints    ROS  Headache: No   Visual changes: No   Swelling in legs: No   Nausea: No   Constipation: No   Diarrhea: No   Contractions: No   Leaking fluid: No   Vaginal bleeding: No   Other:      Specific topics discussed at today's visit: Labor induction  Tests done today: none    Marychuy was seen  today for high risk gestation.    Diagnoses and all orders for this visit:    37 weeks gestation of pregnancy    Gestational hypertension without significant proteinuria during pregnancy in third trimester, antepartum    Anemia during pregnancy in third trimester    Anxiety in pregnancy in third trimester, antepartum

## 2019-01-31 ENCOUNTER — HOSPITAL ENCOUNTER (INPATIENT)
Facility: HOSPITAL | Age: 24
LOS: 2 days | Discharge: HOME | End: 2019-02-02
Attending: OBSTETRICS & GYNECOLOGY | Admitting: OBSTETRICS & GYNECOLOGY

## 2019-01-31 ENCOUNTER — ANESTHESIA (OUTPATIENT)
Dept: LABOR AND DELIVERY | Facility: HOSPITAL | Age: 24
End: 2019-01-31

## 2019-01-31 ENCOUNTER — ANESTHESIA EVENT (OUTPATIENT)
Dept: LABOR AND DELIVERY | Facility: HOSPITAL | Age: 24
End: 2019-01-31

## 2019-01-31 DIAGNOSIS — Z3A.37 37 WEEKS GESTATION OF PREGNANCY: ICD-10-CM

## 2019-01-31 DIAGNOSIS — O99.013 ANEMIA DURING PREGNANCY IN THIRD TRIMESTER: ICD-10-CM

## 2019-01-31 DIAGNOSIS — O99.343 ANXIETY IN PREGNANCY IN THIRD TRIMESTER, ANTEPARTUM: ICD-10-CM

## 2019-01-31 DIAGNOSIS — O13.3 GESTATIONAL HYPERTENSION WITHOUT SIGNIFICANT PROTEINURIA DURING PREGNANCY IN THIRD TRIMESTER, ANTEPARTUM: ICD-10-CM

## 2019-01-31 DIAGNOSIS — F41.9 ANXIETY IN PREGNANCY IN THIRD TRIMESTER, ANTEPARTUM: ICD-10-CM

## 2019-01-31 PROBLEM — O13.9 GESTATIONAL HYPERTENSION: Status: ACTIVE | Noted: 2019-01-31

## 2019-01-31 LAB
ABO GROUP BLD: NORMAL
AMPHET+METHAMPHET UR QL: NEGATIVE
BARBITURATES UR QL SCN: NEGATIVE
BENZODIAZ UR QL SCN: NEGATIVE
BLD GP AB SCN SERPL QL: NEGATIVE
CANNABINOIDS SERPL QL: NEGATIVE
COCAINE UR QL: NEGATIVE
DEPRECATED RDW RBC AUTO: 47.2 FL (ref 36.4–46.3)
ERYTHROCYTE [DISTWIDTH] IN BLOOD BY AUTOMATED COUNT: 14.5 % (ref 11.5–14.5)
HCT VFR BLD AUTO: 34.4 % (ref 35–45)
HGB BLD-MCNC: 11.7 G/DL (ref 12–15.5)
Lab: NORMAL
MCH RBC QN AUTO: 30.2 PG (ref 26.5–34)
MCHC RBC AUTO-ENTMCNC: 34 G/DL (ref 31.4–36)
MCV RBC AUTO: 88.9 FL (ref 80–98)
METHADONE UR QL SCN: NEGATIVE
OPIATES UR QL: NEGATIVE
OXYCODONE UR QL SCN: NEGATIVE
PLATELET # BLD AUTO: 283 10*3/MM3 (ref 150–450)
PMV BLD AUTO: 11.3 FL (ref 8–12)
RBC # BLD AUTO: 3.87 10*6/MM3 (ref 3.77–5.16)
RH BLD: POSITIVE
T&S EXPIRATION DATE: NORMAL
WBC NRBC COR # BLD: 13.42 10*3/MM3 (ref 3.2–9.8)

## 2019-01-31 PROCEDURE — 80307 DRUG TEST PRSMV CHEM ANLYZR: CPT | Performed by: OBSTETRICS & GYNECOLOGY

## 2019-01-31 PROCEDURE — 85027 COMPLETE CBC AUTOMATED: CPT | Performed by: OBSTETRICS & GYNECOLOGY

## 2019-01-31 PROCEDURE — 90471 IMMUNIZATION ADMIN: CPT | Performed by: OBSTETRICS & GYNECOLOGY

## 2019-01-31 PROCEDURE — 25010000002 TDAP 5-2.5-18.5 LF-MCG/0.5 SUSPENSION: Performed by: OBSTETRICS & GYNECOLOGY

## 2019-01-31 PROCEDURE — 90715 TDAP VACCINE 7 YRS/> IM: CPT | Performed by: OBSTETRICS & GYNECOLOGY

## 2019-01-31 PROCEDURE — 59410 OBSTETRICAL CARE: CPT | Performed by: OBSTETRICS & GYNECOLOGY

## 2019-01-31 PROCEDURE — 51703 INSERT BLADDER CATH COMPLEX: CPT

## 2019-01-31 PROCEDURE — 86901 BLOOD TYPING SEROLOGIC RH(D): CPT | Performed by: OBSTETRICS & GYNECOLOGY

## 2019-01-31 PROCEDURE — 3E033VJ INTRODUCTION OF OTHER HORMONE INTO PERIPHERAL VEIN, PERCUTANEOUS APPROACH: ICD-10-PCS | Performed by: OBSTETRICS & GYNECOLOGY

## 2019-01-31 PROCEDURE — 86850 RBC ANTIBODY SCREEN: CPT | Performed by: OBSTETRICS & GYNECOLOGY

## 2019-01-31 PROCEDURE — 86900 BLOOD TYPING SEROLOGIC ABO: CPT | Performed by: OBSTETRICS & GYNECOLOGY

## 2019-01-31 PROCEDURE — 0KQM0ZZ REPAIR PERINEUM MUSCLE, OPEN APPROACH: ICD-10-PCS | Performed by: OBSTETRICS & GYNECOLOGY

## 2019-01-31 PROCEDURE — C1755 CATHETER, INTRASPINAL: HCPCS | Performed by: NURSE ANESTHETIST, CERTIFIED REGISTERED

## 2019-01-31 RX ORDER — SODIUM CHLORIDE, SODIUM LACTATE, POTASSIUM CHLORIDE, CALCIUM CHLORIDE 600; 310; 30; 20 MG/100ML; MG/100ML; MG/100ML; MG/100ML
INJECTION, SOLUTION INTRAVENOUS
Status: COMPLETED
Start: 2019-01-31 | End: 2019-01-31

## 2019-01-31 RX ORDER — IBUPROFEN 800 MG/1
TABLET ORAL
Status: COMPLETED
Start: 2019-01-31 | End: 2019-01-31

## 2019-01-31 RX ORDER — ACETAMINOPHEN 325 MG/1
650 TABLET ORAL EVERY 4 HOURS PRN
Status: DISCONTINUED | OUTPATIENT
Start: 2019-01-31 | End: 2019-02-02 | Stop reason: HOSPADM

## 2019-01-31 RX ORDER — ONDANSETRON 2 MG/ML
4 INJECTION INTRAMUSCULAR; INTRAVENOUS EVERY 6 HOURS PRN
Status: DISCONTINUED | OUTPATIENT
Start: 2019-01-31 | End: 2019-01-31 | Stop reason: HOSPADM

## 2019-01-31 RX ORDER — CALCIUM CARBONATE 200(500)MG
2 TABLET,CHEWABLE ORAL 3 TIMES DAILY PRN
Status: DISCONTINUED | OUTPATIENT
Start: 2019-01-31 | End: 2019-02-02 | Stop reason: HOSPADM

## 2019-01-31 RX ORDER — LANOLIN 100 %
OINTMENT (GRAM) TOPICAL
Status: DISCONTINUED | OUTPATIENT
Start: 2019-01-31 | End: 2019-02-02 | Stop reason: HOSPADM

## 2019-01-31 RX ORDER — BUPIVACAINE HYDROCHLORIDE 2.5 MG/ML
INJECTION, SOLUTION EPIDURAL; INFILTRATION; INTRACAUDAL AS NEEDED
Status: DISCONTINUED | OUTPATIENT
Start: 2019-01-31 | End: 2019-01-31 | Stop reason: SURG

## 2019-01-31 RX ORDER — METHYLERGONOVINE MALEATE 0.2 MG/ML
200 INJECTION INTRAVENOUS ONCE AS NEEDED
Status: DISCONTINUED | OUTPATIENT
Start: 2019-01-31 | End: 2019-01-31 | Stop reason: HOSPADM

## 2019-01-31 RX ORDER — ONDANSETRON 4 MG/1
4 TABLET, ORALLY DISINTEGRATING ORAL EVERY 6 HOURS PRN
Status: DISCONTINUED | OUTPATIENT
Start: 2019-01-31 | End: 2019-01-31 | Stop reason: HOSPADM

## 2019-01-31 RX ORDER — ACETAMINOPHEN 325 MG/1
TABLET ORAL
Status: COMPLETED
Start: 2019-01-31 | End: 2019-01-31

## 2019-01-31 RX ORDER — DOCUSATE SODIUM 100 MG/1
100 CAPSULE, LIQUID FILLED ORAL 3 TIMES DAILY
Status: DISCONTINUED | OUTPATIENT
Start: 2019-01-31 | End: 2019-02-02 | Stop reason: HOSPADM

## 2019-01-31 RX ORDER — LIDOCAINE HYDROCHLORIDE 20 MG/ML
INJECTION, SOLUTION INFILTRATION; PERINEURAL AS NEEDED
Status: DISCONTINUED | OUTPATIENT
Start: 2019-01-31 | End: 2019-01-31 | Stop reason: SURG

## 2019-01-31 RX ORDER — ACETAMINOPHEN 325 MG/1
650 TABLET ORAL EVERY 4 HOURS PRN
Status: DISCONTINUED | OUTPATIENT
Start: 2019-01-31 | End: 2019-01-31 | Stop reason: HOSPADM

## 2019-01-31 RX ORDER — LIDOCAINE HYDROCHLORIDE AND EPINEPHRINE 15; 5 MG/ML; UG/ML
INJECTION, SOLUTION EPIDURAL AS NEEDED
Status: DISCONTINUED | OUTPATIENT
Start: 2019-01-31 | End: 2019-01-31 | Stop reason: SURG

## 2019-01-31 RX ORDER — SODIUM CHLORIDE 0.9 % (FLUSH) 0.9 %
3-10 SYRINGE (ML) INJECTION AS NEEDED
Status: DISCONTINUED | OUTPATIENT
Start: 2019-01-31 | End: 2019-01-31 | Stop reason: HOSPADM

## 2019-01-31 RX ORDER — IBUPROFEN 800 MG/1
800 TABLET ORAL ONCE
Status: COMPLETED | OUTPATIENT
Start: 2019-01-31 | End: 2019-01-31

## 2019-01-31 RX ORDER — ONDANSETRON 4 MG/1
4 TABLET, FILM COATED ORAL EVERY 6 HOURS PRN
Status: DISCONTINUED | OUTPATIENT
Start: 2019-01-31 | End: 2019-01-31 | Stop reason: HOSPADM

## 2019-01-31 RX ORDER — IBUPROFEN 600 MG/1
600 TABLET ORAL EVERY 6 HOURS PRN
Status: DISCONTINUED | OUTPATIENT
Start: 2019-01-31 | End: 2019-01-31 | Stop reason: HOSPADM

## 2019-01-31 RX ORDER — SODIUM CHLORIDE, SODIUM LACTATE, POTASSIUM CHLORIDE, CALCIUM CHLORIDE 600; 310; 30; 20 MG/100ML; MG/100ML; MG/100ML; MG/100ML
125 INJECTION, SOLUTION INTRAVENOUS CONTINUOUS
Status: DISCONTINUED | OUTPATIENT
Start: 2019-01-31 | End: 2019-02-02 | Stop reason: HOSPADM

## 2019-01-31 RX ORDER — PRENATAL VIT/IRON FUM/FOLIC AC 27MG-0.8MG
1 TABLET ORAL DAILY
Status: DISCONTINUED | OUTPATIENT
Start: 2019-01-31 | End: 2019-02-02 | Stop reason: HOSPADM

## 2019-01-31 RX ORDER — CETIRIZINE HYDROCHLORIDE 10 MG/1
10 TABLET ORAL DAILY
Status: DISCONTINUED | OUTPATIENT
Start: 2019-01-31 | End: 2019-02-02 | Stop reason: HOSPADM

## 2019-01-31 RX ORDER — OXYTOCIN/RINGER'S LACTATE 20/1000 ML
PLASTIC BAG, INJECTION (ML) INTRAVENOUS
Status: COMPLETED
Start: 2019-01-31 | End: 2019-01-31

## 2019-01-31 RX ORDER — DIPHENHYDRAMINE HYDROCHLORIDE 50 MG/ML
25 INJECTION INTRAMUSCULAR; INTRAVENOUS NIGHTLY PRN
Status: DISCONTINUED | OUTPATIENT
Start: 2019-01-31 | End: 2019-01-31 | Stop reason: HOSPADM

## 2019-01-31 RX ORDER — SODIUM CHLORIDE 0.9 % (FLUSH) 0.9 %
3 SYRINGE (ML) INJECTION EVERY 12 HOURS SCHEDULED
Status: DISCONTINUED | OUTPATIENT
Start: 2019-01-31 | End: 2019-01-31 | Stop reason: HOSPADM

## 2019-01-31 RX ORDER — CALCIUM CARBONATE 200(500)MG
TABLET,CHEWABLE ORAL
Status: COMPLETED
Start: 2019-01-31 | End: 2019-01-31

## 2019-01-31 RX ORDER — MISOPROSTOL 100 MCG
25 TABLET ORAL ONCE
Status: DISCONTINUED | OUTPATIENT
Start: 2019-01-31 | End: 2019-01-31 | Stop reason: HOSPADM

## 2019-01-31 RX ORDER — PANTOPRAZOLE SODIUM 40 MG/1
40 TABLET, DELAYED RELEASE ORAL EVERY MORNING
Status: DISCONTINUED | OUTPATIENT
Start: 2019-01-31 | End: 2019-02-02 | Stop reason: HOSPADM

## 2019-01-31 RX ORDER — DOCUSATE SODIUM 100 MG/1
100 CAPSULE, LIQUID FILLED ORAL 2 TIMES DAILY
Status: DISCONTINUED | OUTPATIENT
Start: 2019-01-31 | End: 2019-02-01

## 2019-01-31 RX ORDER — CARBOPROST TROMETHAMINE 250 UG/ML
250 INJECTION, SOLUTION INTRAMUSCULAR AS NEEDED
Status: DISCONTINUED | OUTPATIENT
Start: 2019-01-31 | End: 2019-01-31 | Stop reason: HOSPADM

## 2019-01-31 RX ORDER — DIPHENHYDRAMINE HCL 25 MG
25 CAPSULE ORAL NIGHTLY PRN
Status: DISCONTINUED | OUTPATIENT
Start: 2019-01-31 | End: 2019-01-31 | Stop reason: HOSPADM

## 2019-01-31 RX ORDER — MISOPROSTOL 200 UG/1
800 TABLET ORAL AS NEEDED
Status: DISCONTINUED | OUTPATIENT
Start: 2019-01-31 | End: 2019-01-31 | Stop reason: HOSPADM

## 2019-01-31 RX ORDER — CITALOPRAM 40 MG/1
40 TABLET ORAL DAILY
Status: DISCONTINUED | OUTPATIENT
Start: 2019-02-01 | End: 2019-02-02 | Stop reason: HOSPADM

## 2019-01-31 RX ORDER — SODIUM CHLORIDE 0.9 % (FLUSH) 0.9 %
1-10 SYRINGE (ML) INJECTION AS NEEDED
Status: DISCONTINUED | OUTPATIENT
Start: 2019-01-31 | End: 2019-02-02 | Stop reason: HOSPADM

## 2019-01-31 RX ORDER — OXYTOCIN/RINGER'S LACTATE 20/1000 ML
125 PLASTIC BAG, INJECTION (ML) INTRAVENOUS CONTINUOUS PRN
Status: DISCONTINUED | OUTPATIENT
Start: 2019-01-31 | End: 2019-02-02 | Stop reason: HOSPADM

## 2019-01-31 RX ORDER — FERROUS SULFATE TAB EC 324 MG (65 MG FE EQUIVALENT) 324 (65 FE) MG
325 TABLET DELAYED RESPONSE ORAL
Status: DISCONTINUED | OUTPATIENT
Start: 2019-01-31 | End: 2019-02-02 | Stop reason: HOSPADM

## 2019-01-31 RX ORDER — OXYTOCIN/0.9 % SODIUM CHLORIDE 30/500 ML
2-30 PLASTIC BAG, INJECTION (ML) INTRAVENOUS
Status: DISCONTINUED | OUTPATIENT
Start: 2019-01-31 | End: 2019-01-31 | Stop reason: HOSPADM

## 2019-01-31 RX ORDER — BISACODYL 10 MG
10 SUPPOSITORY, RECTAL RECTAL DAILY PRN
Status: DISCONTINUED | OUTPATIENT
Start: 2019-02-01 | End: 2019-02-02 | Stop reason: HOSPADM

## 2019-01-31 RX ORDER — LIDOCAINE HYDROCHLORIDE 10 MG/ML
5 INJECTION, SOLUTION EPIDURAL; INFILTRATION; INTRACAUDAL; PERINEURAL AS NEEDED
Status: DISCONTINUED | OUTPATIENT
Start: 2019-01-31 | End: 2019-01-31 | Stop reason: HOSPADM

## 2019-01-31 RX ADMIN — ACETAMINOPHEN 650 MG: 325 TABLET ORAL at 15:22

## 2019-01-31 RX ADMIN — SODIUM CHLORIDE, POTASSIUM CHLORIDE, SODIUM LACTATE AND CALCIUM CHLORIDE 1000 ML: 600; 310; 30; 20 INJECTION, SOLUTION INTRAVENOUS at 07:19

## 2019-01-31 RX ADMIN — SODIUM CHLORIDE, SODIUM LACTATE, POTASSIUM CHLORIDE, CALCIUM CHLORIDE 125 ML/HR: 600; 310; 30; 20 INJECTION, SOLUTION INTRAVENOUS at 03:35

## 2019-01-31 RX ADMIN — Medication 2 TABLET: at 05:17

## 2019-01-31 RX ADMIN — TETANUS TOXOID, REDUCED DIPHTHERIA TOXOID AND ACELLULAR PERTUSSIS VACCINE, ADSORBED 0.5 ML: 5; 2.5; 8; 8; 2.5 SUSPENSION INTRAMUSCULAR at 20:47

## 2019-01-31 RX ADMIN — SODIUM CHLORIDE, SODIUM LACTATE, POTASSIUM CHLORIDE, CALCIUM CHLORIDE 1000 ML: 600; 310; 30; 20 INJECTION, SOLUTION INTRAVENOUS at 07:19

## 2019-01-31 RX ADMIN — DOCUSATE SODIUM 100 MG: 100 CAPSULE, LIQUID FILLED ORAL at 20:47

## 2019-01-31 RX ADMIN — OXYTOCIN-SODIUM CHLORIDE 0.9% IV SOLN 30 UNIT/500ML 2 MILLI-UNITS/MIN: 30-0.9/5 SOLUTION at 03:33

## 2019-01-31 RX ADMIN — Medication 20 UNITS: at 12:12

## 2019-01-31 RX ADMIN — SODIUM CHLORIDE, POTASSIUM CHLORIDE, SODIUM LACTATE AND CALCIUM CHLORIDE 125 ML/HR: 600; 310; 30; 20 INJECTION, SOLUTION INTRAVENOUS at 03:35

## 2019-01-31 RX ADMIN — IBUPROFEN 800 MG: 800 TABLET ORAL at 20:47

## 2019-01-31 RX ADMIN — PANTOPRAZOLE SODIUM 40 MG: 40 TABLET, DELAYED RELEASE ORAL at 07:19

## 2019-01-31 RX ADMIN — IBUPROFEN 800 MG: 800 TABLET ORAL at 15:21

## 2019-01-31 RX ADMIN — Medication 10 ML/HR: at 07:33

## 2019-01-31 RX ADMIN — LIDOCAINE HYDROCHLORIDE 5 ML: 20 INJECTION, SOLUTION INFILTRATION; PERINEURAL at 07:20

## 2019-01-31 RX ADMIN — IBUPROFEN 800 MG: 600 TABLET ORAL at 15:21

## 2019-01-31 RX ADMIN — CALCIUM CARBONATE (ANTACID) CHEW TAB 500 MG 2 TABLET: 500 CHEW TAB at 05:17

## 2019-01-31 RX ADMIN — SODIUM CHLORIDE, POTASSIUM CHLORIDE, SODIUM LACTATE AND CALCIUM CHLORIDE 1000 ML: 600; 310; 30; 20 INJECTION, SOLUTION INTRAVENOUS at 02:31

## 2019-01-31 RX ADMIN — OXYTOCIN 20 UNITS: 10 INJECTION, SOLUTION INTRAMUSCULAR; INTRAVENOUS at 12:12

## 2019-01-31 RX ADMIN — Medication 3 ML: at 12:09

## 2019-01-31 RX ADMIN — BUPIVACAINE HYDROCHLORIDE 10 ML: 2.5 INJECTION, SOLUTION EPIDURAL; INFILTRATION; INTRACAUDAL; PERINEURAL at 07:30

## 2019-01-31 RX ADMIN — LIDOCAINE HYDROCHLORIDE AND EPINEPHRINE 3 ML: 15; 5 INJECTION, SOLUTION EPIDURAL at 07:25

## 2019-01-31 NOTE — ANESTHESIA PREPROCEDURE EVALUATION
Anesthesia Evaluation     Patient summary reviewed   no history of anesthetic complications:  NPO Solid Status: > 8 hours             Airway   Mallampati: II  TM distance: >3 FB  Neck ROM: full  No difficulty expected  Dental - normal exam     Pulmonary - negative pulmonary ROS and normal exam   (-) not a smoker  Cardiovascular - normal exam    (+) hypertension,       Neuro/Psych  (+) syncope, psychiatric history Anxiety and Depression,     GI/Hepatic/Renal/Endo    (+)  GERD,      Musculoskeletal (-) negative ROS    Abdominal  - normal exam   Substance History - negative use     OB/GYN    (+) Pregnant,         Other - negative ROS                       Anesthesia Plan    ASA 2     epidural     Anesthetic plan, all risks, benefits, and alternatives have been provided, discussed and informed consent has been obtained with: patient and spouse/significant other.    Plan discussed with CRNA.

## 2019-01-31 NOTE — ANESTHESIA POSTPROCEDURE EVALUATION
Patient: Marychuy Aburto    Procedure Summary     Date:  01/31/19 Room / Location:      Anesthesia Start:  0716 Anesthesia Stop:  1226    Procedure:  LABOR ANALGESIA Diagnosis:      Scheduled Providers:   Provider:  Harriet Rivers CRNA    Anesthesia Type:  epidural ASA Status:  2          Anesthesia Type: epidural  Last vitals  BP   133/85 (01/31/19 1513)   Temp   97.6 °F (36.4 °C) (01/31/19 1222)   Pulse   116 (01/31/19 1513)   Resp   18 (01/31/19 1238)     SpO2   98 % (01/31/19 1222)     Post Anesthesia Care and Evaluation    Patient location during evaluation: bedside  Patient participation: complete - patient participated  Level of consciousness: awake and alert  Pain score: 0  Pain management: adequate  Airway patency: patent  Anesthetic complications: No anesthetic complications  PONV Status: none  Cardiovascular status: acceptable  Respiratory status: acceptable  Hydration status: acceptable  Post Neuraxial Block status: No signs or symptoms of PDPH

## 2019-01-31 NOTE — ANESTHESIA PROCEDURE NOTES
Labor Epidural      Patient reassessed immediately prior to procedure    Patient location during procedure: OB  Start Time: 1/31/2019 7:16 AM  Performed By  CRNA: Harriet Rivers CRNA  Preanesthetic Checklist  Completed: patient identified, site marked, surgical consent, pre-op evaluation, timeout performed, IV checked, risks and benefits discussed and monitors and equipment checked  Prep:  Pt Position:sitting  Sterile Tech:cap, gloves, mask and sterile barrier  Prep:povidone-iodine 7.5% surgical scrub  Monitoring:blood pressure monitoring and continuous pulse oximetry  Epidural Block Procedure:  Approach:midline  Guidance:landmark technique and palpation technique  Location:L3-L4  Needle Type:Tuohy  Needle Gauge:17 G  Loss of Resistance Medium: saline  Loss of Resistance: 8cm  Cath Depth at skin:12 cm  Paresthesia: none  Aspiration:negative  Test Dose:negative  Number of Attempts: 1  Post Assessment:  Dressing:occlusive dressing applied and secured with tape  Pt Tolerance:patient tolerated the procedure well with no apparent complications  Complications:no

## 2019-02-01 LAB
BASOPHILS # BLD AUTO: 0.02 10*3/MM3 (ref 0–0.2)
BASOPHILS NFR BLD AUTO: 0.1 % (ref 0–2)
DEPRECATED RDW RBC AUTO: 49.5 FL (ref 36.4–46.3)
EOSINOPHIL # BLD AUTO: 0.17 10*3/MM3 (ref 0–0.7)
EOSINOPHIL NFR BLD AUTO: 1.1 % (ref 0–7)
ERYTHROCYTE [DISTWIDTH] IN BLOOD BY AUTOMATED COUNT: 14.9 % (ref 11.5–14.5)
HCT VFR BLD AUTO: 24.3 % (ref 35–45)
HGB BLD-MCNC: 8 G/DL (ref 12–15.5)
IMM GRANULOCYTES # BLD AUTO: 0.06 10*3/MM3 (ref 0–0.02)
IMM GRANULOCYTES NFR BLD AUTO: 0.4 % (ref 0–0.5)
LYMPHOCYTES # BLD AUTO: 2.57 10*3/MM3 (ref 0.6–4.2)
LYMPHOCYTES NFR BLD AUTO: 17 % (ref 10–50)
MCH RBC QN AUTO: 30.1 PG (ref 26.5–34)
MCHC RBC AUTO-ENTMCNC: 32.9 G/DL (ref 31.4–36)
MCV RBC AUTO: 91.4 FL (ref 80–98)
MONOCYTES # BLD AUTO: 1.19 10*3/MM3 (ref 0–0.9)
MONOCYTES NFR BLD AUTO: 7.9 % (ref 0–12)
NEUTROPHILS # BLD AUTO: 11.07 10*3/MM3 (ref 2–8.6)
NEUTROPHILS NFR BLD AUTO: 73.5 % (ref 37–80)
PLATELET # BLD AUTO: 234 10*3/MM3 (ref 150–450)
PMV BLD AUTO: 11.1 FL (ref 8–12)
RBC # BLD AUTO: 2.66 10*6/MM3 (ref 3.77–5.16)
WBC NRBC COR # BLD: 15.08 10*3/MM3 (ref 3.2–9.8)

## 2019-02-01 PROCEDURE — 85025 COMPLETE CBC W/AUTO DIFF WBC: CPT | Performed by: STUDENT IN AN ORGANIZED HEALTH CARE EDUCATION/TRAINING PROGRAM

## 2019-02-01 RX ORDER — IBUPROFEN 800 MG/1
800 TABLET ORAL EVERY 6 HOURS PRN
Status: DISCONTINUED | OUTPATIENT
Start: 2019-02-01 | End: 2019-02-02 | Stop reason: HOSPADM

## 2019-02-01 RX ADMIN — DOCUSATE SODIUM 100 MG: 100 CAPSULE, LIQUID FILLED ORAL at 09:58

## 2019-02-01 RX ADMIN — PRENATAL VIT W/ FE FUMARATE-FA TAB 27-0.8 MG 1 TABLET: 27-0.8 TAB at 09:58

## 2019-02-01 RX ADMIN — FERROUS SULFATE TAB EC 324 MG (65 MG FE EQUIVALENT) 324 MG: 324 (65 FE) TABLET DELAYED RESPONSE at 07:33

## 2019-02-01 RX ADMIN — DOCUSATE SODIUM 100 MG: 100 CAPSULE, LIQUID FILLED ORAL at 16:38

## 2019-02-01 RX ADMIN — FERROUS SULFATE TAB EC 324 MG (65 MG FE EQUIVALENT) 324 MG: 324 (65 FE) TABLET DELAYED RESPONSE at 13:18

## 2019-02-01 RX ADMIN — IBUPROFEN 800 MG: 800 TABLET ORAL at 07:33

## 2019-02-01 RX ADMIN — IBUPROFEN 800 MG: 800 TABLET ORAL at 23:20

## 2019-02-01 RX ADMIN — CITALOPRAM HYDROBROMIDE 40 MG: 40 TABLET ORAL at 09:58

## 2019-02-01 RX ADMIN — PANTOPRAZOLE SODIUM 40 MG: 40 TABLET, DELAYED RELEASE ORAL at 07:34

## 2019-02-01 RX ADMIN — FERROUS SULFATE TAB EC 324 MG (65 MG FE EQUIVALENT) 325 MG: 324 (65 FE) TABLET DELAYED RESPONSE at 20:08

## 2019-02-01 RX ADMIN — IBUPROFEN 800 MG: 800 TABLET ORAL at 17:30

## 2019-02-01 RX ADMIN — CETIRIZINE HYDROCHLORIDE 10 MG: 10 TABLET, FILM COATED ORAL at 09:58

## 2019-02-01 RX ADMIN — DOCUSATE SODIUM 100 MG: 100 CAPSULE, LIQUID FILLED ORAL at 20:51

## 2019-02-01 NOTE — PROGRESS NOTES
.      POSTPARTUM PROGRESS NOTE  NAME: Marychuy Aburto  : 1995  MRN: [id number]      LOS: 1 day     Chief Complaint: No complaints    Subjective:     Interval History:  Pain well controlled without medications, (+) Flatus, (-) BM, lochia minimal, (+) voiding, (+) ambulation.  Desires OCPs for postpartum contraception. Bottle feeding.       Objective:     Vital Signs  Temp:  [97.3 °F (36.3 °C)-98.2 °F (36.8 °C)] 98.1 °F (36.7 °C)  Heart Rate:  [] 105  Resp:  [18] 18  BP: (0-163)/(0-95) 122/73    Physical Exam  GEN: A&O x3, NAD  CVS: RRR, S1/S2, no m/g/r  LUNGS: CTAB, no wheezes, no rhonchi  ABD: Soft, Nontender;  Fundus: firm below umbilicus  EXT: no edema, no calf tenderness bilaterally.    Medication Review    Current Facility-Administered Medications:   •  acetaminophen (TYLENOL) tablet 650 mg, 650 mg, Oral, Q4H PRN, Joseline Benavidez MD  •  benzocaine (AMERICAINE) 20 % rectal ointment 1 application, 1 application, Rectal, PRN, Joseline Benavidez MD  •  benzocaine-lanolin-aloe vera (DERMOPLAST) 20-0.5 % topical spray, , Topical, PRN, Joseline Benavidez MD  •  bisacodyl (DULCOLAX) suppository 10 mg, 10 mg, Rectal, Daily PRN, Joseline Benavidez MD  •  calcium carbonate (TUMS) chewable tablet 500 mg (200 mg elemental), 2 tablet, Oral, TID PRN, Hakan Wing MD, 2 tablet at 19 0517  •  cetirizine (zyrTEC) tablet 10 mg, 10 mg, Oral, Daily, Joseline Benavidez MD  •  citalopram (CeleXA) tablet 40 mg, 40 mg, Oral, Daily, Joseline Benavidez MD  •  docusate sodium (COLACE) capsule 100 mg, 100 mg, Oral, BID, Joseline Benavidez MD, 100 mg at 19  •  docusate sodium (COLACE) capsule 100 mg, 100 mg, Oral, TID, Joseline Benavidez MD  •  ferrous sulfate EC tablet 325 mg, 325 mg, Oral, TID With Meals, Joseline Benavidez MD  •  hydrocortisone (ANUSOL-HC) 2.5 % rectal cream 1 application, 1 application, Rectal, PRN, Joseline Benavidez MD  •  lactated ringers infusion, 125 mL/hr, Intravenous, Continuous, Favio Sims MD, Last Rate:  125 mL/hr at 19, 1,000 mL at 19  •  lanolin ointment, , Topical, Q1H PRN, Joseline Benavidez MD  •  magnesium hydroxide (MILK OF MAGNESIA) suspension 2400 mg/10mL 10 mL, 10 mL, Oral, Daily PRN, Joseline Benavidez MD  •  Oxytocin-Lactated Ringers (PITOCIN) 20 UNIT/L in lactated Ringer's 1000 mL IVPB, 125 mL/hr, Intravenous, Continuous PRN, Favio Sims MD, Last Rate: 125 mL/hr at 19, 20 Units at 19  •  pantoprazole (PROTONIX) EC tablet 40 mg, 40 mg, Oral, QAM, Favio Sims MD, 40 mg at 19  •  pramoxine-hydrocortisone 1-1 % foam, , Topical, PRN, Joseline Benavidez MD  •  prenatal vitamin 27-0.8 tablet 1 tablet, 1 tablet, Oral, Daily, Joseline Benavidez MD  •  sodium chloride 0.9 % flush 1-10 mL, 1-10 mL, Intravenous, PRN, Joseline Benavidez MD     Diagnostic Data    Lab Results (last 24 hours)     Procedure Component Value Units Date/Time    CBC & Differential [[id number]] Collected:  19    Specimen:  Blood Updated:  19    Narrative:       The following orders were created for panel order CBC & Differential.  Procedure                               Abnormality         Status                     ---------                               -----------         ------                     CBC Auto Differential[[id number]]                            In process                   Please view results for these tests on the individual orders.    CBC Auto Differential [[id number]] Collected:  19    Specimen:  Blood Updated:  19          I reviewed the patient's new clinical results.    Assessment/Plan:     Marychuy Wallace Criselda 23 y.o.  PPD#1 s/p   - Continue routine postpartum care  - Encourage ambulation  - Advance diet as tolerated    Plan for disposition: Discharge home in 1-2 days            This document has been electronically signed by Favio Sims MD on 2019 11:43 AM

## 2019-02-01 NOTE — PAYOR COMM NOTE
"Marychuy Love (23 y.o. Female)     Date of Birth Social Security Number Address Home Phone MRN    1995  [id number] [facility] [address] [phone] [id number]    Christianity Marital Status          None Single       Admission Date Admission Type Admitting Provider Attending Provider Department, Room/Bed    1/31/19 Elective Favio Sims MD Stitt, John C, MD [facility] MOTHER BABY, M756/1    Discharge Date Discharge Disposition Discharge Destination                       Attending Provider:  Favio Sims MD    Allergies:  Lorabid [Loracarbef]    Isolation:  None   Infection:  None   Code Status:  CPR    Ht:  165.1 cm (65\")   Wt:  74.4 kg (164 lb)    Admission Cmt:  None   Principal Problem:  O80                Active Insurance as of 1/31/2019     Primary Coverage     Payor Plan Insurance Group Employer/Plan Group    [employer] [place] [employer] [place]ICA[place]      Payor Plan Address Payor Plan Phone Number Payor Plan Fax Number Effective Dates    [address] [address] [phone]  8/24/2016 - None Entered    [facility] [id number]       Subscriber Name Subscriber Birth Date Member ID       MARYCHUY LOVE 1995 [id number]                 Emergency Contacts      (Rel.) Home Phone Work Phone Mobile Phone    Lauren Meade (Mother) [phone] -- --    [email or web address] price (Friend) -- -- [phone]        [facility]  P:[phone]  F:[phone]       History & Physical      Joseline Benavidez MD at 1/31/2019 10:24 AM     Attestation signed by Favio Sims MD at 1/31/2019 10:44 AM    I have seen and evaluated the patient.  I have discussed the case with the resident. I have reviewed the notes, assessment and plan, and/or procedures performed by the resident. I concur with the resident’s documentation.          This document has been electronically signed by Favio Sims MD on January 31, 2019 10:44 AM                    H&P Update    23 " y.o.  at 37w5d admitted for IOL for gestational hypertension.   Reviewed history.   Blood pressures mild range to normotensive.   SVE this morning by Dr. Sims 50/-1 and AROM with copious fluid per report.   ReSVE at approximately 0945 by MS4 Kana C/C/0.   Cat I tracing with mod andre +accel -decel  Ctx q2-3 mins on pit  Cont pitocin per protocol  Start pushing efforts in 1-2 hrs  Anticipate   LChoi, R3  Discussed with Dr. Sims    Electronically signed by Favio Sims MD at 2019 10:44 AM   Source Note               Obstetric History and Physical    Chief Complaint   Patient presents with   • High Risk Gestation     Marychuy Aburto is a 22 y.o. year old .  Patient's last menstrual period was 2018 (exact date).      She has a history of one previous miscarriage in .  She has a history of hemangiomas.  One is on the liver.     She lives in [place]. 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     Smoking status: Never Smoker                                                             Smokeless tobacco: Never Used                            Imaging   Transvaginal ultrasound performed 2018 shows single intrauterine pregnancy consistent with 5 weeks and 5 days with fetal heart rate 99 bpm and NICKIE by ultrasound 2019.     Limited ultrasound performed 2018 because of inability to hear heart tones shows single intrauterine pregnancy with fetal heart rate 166 bpm and crown-rump length consistent with 10 weeks and 6 days.     GERD is much improved.     She is having a boy named Deanna.     She declines genetic testing.     Ultrasound 2018 shows single intrauterine pregnancy consistent with 21 weeks and 4 days.  No abnormalities noted.  Three-vessel umbilical cord.  Fetal heart rate 149 bpm.  Cervix 4.4 cm long.  [facility] recommended repeat ultrasound for fetal spine in -10  weeks.     The patient complains of the following: Left sciatica.  We'll schedule physical therapy with [facility].     2018- MONICA visit today; feeling well, no complaints. Has had one appt with [facility] for PT and states that she is doing her recommended stretches at home. She is still having some sciatic pain on the left side but is managing that better now. Still taking prilosec for heartburn and feels that it is well controlled. Denies dysuria, vb, LOF, constipation or headaches. Needs 1 hour glucose test in 3 weeks; drink and instructions given. F/U for MONICA visit in 3 weeks or sooner PRN.     2018 1 hour Glucola 146.  Hemoglobin 11.2 and hematocrit 32.9 with MCV 91.4.  We'll place on iron.  Schedule 3 hour GTT.  She has restless leg syndrome that 2 Tylenol at bedtime seemed to help.  Follow-up ultrasound in 2 weeks.     2018 3 hour glucose test normal at 85/140/150/115.  Ultrasound shows single intrauterine pregnancy in the vertex position.  Fetal heart rate 141 bpm.  Estimated fetal weight 3 lbs. 9 oz. or 54th percentile.  Three-vessel umbilical cord.  HOMERO normal at 14.8     January 3, 2019 and a has a long history of depression and anxiety.  Anxiety is getting significantly worse now.  I'm placing her on Celexa 20 mg a day.  Also recommended vitamin D3 and B12 supplementation.  GBS swab performed today.     GBS negative.     January 15, 2019 ultrasound shows single intrauterine pregnancy in the cephalic position HOMERO 14.88 biophysical profile 8 out of 8 blood pressures are normal.  Her 24-hour urine protein on 2019 was normal at 55.2.     2019, no new complaints.  Fetal kick counts and  labor precautions.    2019, 37 weeks and 3 days.  Blood pressure 143/90.  Pulse 96.  No headaches.  No visual changes.  Cervix 2/70%/-2.  Plan for labor induction on Thursday, 2019 at 0200.     Obstetric History   #: 1, Date: , Sex: None,  Weight: None, GA: None, Delivery: None, Apgar1: None, Apgar5: None, Living: None, Birth Comments: None    #: 2, Date: None, Sex: None, Weight: None, GA: None, Delivery: None, Apgar1: None, Apgar5: None, Living: None, Birth Comments: None      The following portions of the patients history were reviewed and updated as appropriate: current medications, allergies, past medical history, past surgical history, past family history, past social history and problem list .       Prenatal Information:  Prenatal Results     Initial Prenatal Labs     Test Value Reference Range Date Time    Hemoglobin 12.9 g/dL 12.0 - 15.5 g/dL 07/26/18 1054    Hematocrit 38.2 % 35.0 - 45.0 % 07/26/18 1054    Platelets 271 10*3/mm3 150 - 450 10*3/mm3 01/06/19 1207    Rubella IgG 236.0 IU/mL 0.0 - 9.9 IU/mL 07/26/18 1054      Immune  Immune 07/26/18 1054    Hepatitis B SAg Negative  Negative 07/26/18 1054    Hepatitis C Ab Negative  Negative 07/26/18 1054    RPR Non-Reactive  Non-Reactive 07/26/18 1054    ABO O   07/26/18 1054    Rh Positive   07/26/18 1054    Antibody Screen Negative   07/26/18 1054    HIV Negative  Negative 07/26/18 1054    Urine Culture No growth at 24 hours   07/26/18 1054    Gonorrhea        Chlamydia        TSH 1.150 mIU/mL 0.460 - 4.680 mIU/mL 07/26/18 0841          2nd and 3rd Trimester     Test Value Reference Range Date Time    Hemoglobin (repeated) 11.5 g/dL 12.0 - 15.5 g/dL 01/06/19 1207    Hematocrit (repeated) 33.9 % 35.0 - 45.0 % 01/06/19 1207     mg/dL 60 - 190 mg/dL 12/11/18 0848    Antibody Screen (repeated)        GTT Fasting        GTT 1 Hr        GTT 2 Hr        GTT 3 Hr        Group B Strep Negative  Negative 01/03/19 0905          Drug Screening     Test Value Reference Range Date Time    Amphetamine Screen Negative  Negative 07/26/18 1054    Barbiturate Screen Negative  Negative 07/26/18 1054    Benzodiazepine Screen Negative  Negative 07/26/18 1054    Methadone Screen Negative  Negative 07/26/18  1054    Phencyclidine Screen        Opiates Screen Negative  Negative 07/26/18 1054    THC Screen Negative  Negative 07/26/18 1054    Cocaine Screen Negative  Negative 07/26/18 1054    Propoxyphene Screen        Buprenorphine Screen        Methamphetamine Screen        Oxycodone Screen Negative  Negative 07/26/18 1054    Tricyclic Antidepressants Screen              Other (Risk screening)     Test Value Reference Range Date Time    Varicella IgG        Parvovirus IgG        CMV IgG        Cystic Fibrosis        Hemoglobin electrophoresis        NIPT        MSAFP-4        AFP (for NTD only)                  External Prenatal Results     Pregnancy Outside Results - Transcribed From Office Records - See Scanned Records For Details     Test Value Date Time    Hgb 11.5 g/dL 01/06/19 1207    Hct 33.9 % 01/06/19 1207    ABO O  07/26/18 1054    Rh Positive  07/26/18 1054    Antibody Screen Negative  07/26/18 1054    Glucose Fasting GTT       Glucose Tolerance Test 1 hour       Glucose Tolerance Test 3 hour       Gonorrhea (discrete)       Chlamydia (discrete)       RPR Non-Reactive  07/26/18 1054    VDRL       Syphilis Antibody       Rubella 236.0 IU/mL 07/26/18 1054      Immune  07/26/18 1054    HBsAg Negative  07/26/18 1054    Herpes Simplex Virus PCR       Herpes Simplex VIrus Culture       HIV Negative  07/26/18 1054    Hep C RNA Quant PCR       Hep C Antibody Negative  07/26/18 1054    AFP       Group B Strep Negative  01/03/19 0905    GBS Susceptibility to Clindamycin       GBS Susceptibility to Erythromycin       Fetal Fibronectin       Genetic Testing, Maternal Blood             Drug Screening     Test Value Date Time    Urine Drug Screen       Amphetamine Screen Negative  07/26/18 1054    Barbiturate Screen Negative  07/26/18 1054    Benzodiazepine Screen Negative  07/26/18 1054    Methadone Screen Negative  07/26/18 1054    Phencyclidine Screen       Opiates Screen Negative  07/26/18 1054    THC Screen Negative   18 1054    Cocaine Screen       Propoxyphene Screen       Buprenorphine Screen       Methamphetamine Screen       Oxycodone Screen Negative  18 1054    Tricyclic Antidepressants Screen                    Past OB History:     Obstetric History       T0      L0     SAB1   TAB0   Ectopic0   Molar0   Multiple0   Live Births0       # Outcome Date GA Lbr Leland/2nd Weight Sex Delivery Anes PTL Lv   2 Current            2012                   ALLERGIES:     Allergies   Allergen Reactions   • Lorabid [Loracarbef] Rash        Home Medications:     Prior to Admission medications    Medication Sig Start Date End Date Taking? Authorizing Provider   citalopram (CeleXA) 40 MG tablet Take 1 tablet by mouth Daily. 1/3/19  Yes Favio Sims MD   Cyanocobalamin (B-12 PO) Take  by mouth.   Yes Emergency, Nurse Cathie RN   ferrous sulfate 325 (65 FE) MG tablet Take 1 tablet by mouth 3 (Three) Times a Day With Meals. 18  Yes Favio Sims MD   loratadine (CLARITIN) 10 MG tablet Take 1 tablet by mouth Daily. 19  Yes Jd Higginbotham MD   Prenatal Multivit-Min-Fe-FA (PRE-NERY PO) Take  by mouth Daily.   Yes Emergency, Nurse Epic, RN       Past Medical History: Past Medical History:   Diagnosis Date   • Acute sinusitis    • Agoraphobia with panic attacks    • Chronic depression    • Corneal abrasion    • Dysfunction of eustachian tube    • Encounter for general counseling on prescription of oral contraceptives    • Generalized anxiety disorder    • Hemangioma     mediastinal cavernous [id number] stable      • Herpes simplex    • Iron deficiency anemia    • Laryngitis    • Malaise and fatigue    • Moderate Vaginal discharge     reports frequent yeast infections      • Otitis media    • Ovarian cyst    • PONV (postoperative nausea and vomiting)    • Streptococcal pharyngitis    • Syncope    • Tracheitis    • Verruca plantaris       Past Surgical History Past Surgical History:   Procedure Laterality Date    • KNEE ARTHROSCOPY  2009    x2   • MEDIASTINOSCOPY  04/21/2011    Mediastinoscopy. Cavernous hemangioma. Right paratracheal mass.   • WISDOM TOOTH EXTRACTION        Family History: Family History   Problem Relation Age of Onset   • Cancer Other    • Diabetes Other    • Heart disease Other    • Hypertension Other    • Hypertension Paternal Grandmother    • Diabetes Paternal Grandmother    • Breast cancer Paternal Grandmother       Social History:  reports that  has never smoked. she has never used smokeless tobacco.   reports that she does not drink alcohol.   reports that she does not use drugs.     Review of Systems   Constitutional: Negative for activity change, appetite change, chills, diaphoresis, fatigue, fever and unexpected weight change.   Gastrointestinal: Negative for abdominal pain, constipation, diarrhea and nausea.   Genitourinary: Negative for difficulty urinating, dyspareunia, dysuria, menstrual problem, pelvic pain, urgency, vaginal bleeding, vaginal discharge and vaginal pain.   Neurological: Negative for headaches.   Psychiatric/Behavioral: Negative for dysphoric mood. The patient is not nervous/anxious.    All other systems reviewed and are negative.        Objective       Vital Signs Range for the last 24 hours  Temperature:     Temp Source:     BP: BP: (143)/(90) 143/90   Pulse:     Respirations:     SPO2:     O2 Amount (l/min):     O2 Devices     Weight: Weight:  [74.4 kg (164 lb)] 74.4 kg (164 lb)       OBGyn Exam  Physical Examination: General appearance - alert, well appearing, and in no distress and oriented to person, place, and time  Mental status - alert, oriented to person, place, and time, normal mood, behavior, speech, dress, motor activity, and thought processes  Neck - supple, no significant adenopathy  Chest - clear to auscultation, no wheezes, rales or rhonchi, symmetric air entry, no tachypnea, retractions or cyanosis  Heart - normal rate, regular rhythm, normal S1, S2, no  murmurs, rubs, clicks or gallops  Abdomen - Gravid and nontender  Extremities - peripheral pulses normal, no pedal edema, no clubbing or cyanosis        Assessment:  1. Intrauterine pregnancy at 37 weeks and 5 days on 2019 with gestational hypertension and a favorable cervix.    GBS status: Results in Past 30 Days  Result Component Current Result Ref Range Previous Result Ref Range   Group B Strep, DNA Negative (1/3/2019) Negative Not in Time Range        Plan:  1. Admit to labor and delivery on 2019 for labor induction.  2. Plan of care has been reviewed with staff and patient.  3. Risks, benefits of treatment plan have been discussed.  4. All questions have been answered.      Favio Sims MD  2019  8:48 PM       Electronically signed by Favio Sims MD at 2019  8:51 PM             Favio Sims MD at 2019 11:00 AM            Obstetric History and Physical    Chief Complaint   Patient presents with   • High Risk Gestation     Marychuy Aburto is a 22 y.o. year old .  Patient's last menstrual period was 2018 (exact date).      She has a history of one previous miscarriage in .  She has a history of hemangiomas.  One is on the liver.     She lives in [place]. 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     Smoking status: Never Smoker                                                             Smokeless tobacco: Never Used                            Imaging   Transvaginal ultrasound performed 2018 shows single intrauterine pregnancy consistent with 5 weeks and 5 days with fetal heart rate 99 bpm and NICKIE by ultrasound 2019.     Limited ultrasound performed 2018 because of inability to hear heart tones shows single intrauterine pregnancy with fetal heart rate 166 bpm and crown-rump length consistent with 10 weeks and 6 days.     GERD is much improved.     She  is having a boy named Deanna.     She declines genetic testing.     Ultrasound October 9, 2018 shows single intrauterine pregnancy consistent with 21 weeks and 4 days.  No abnormalities noted.  Three-vessel umbilical cord.  Fetal heart rate 149 bpm.  Cervix 4.4 cm long.  [facility] recommended repeat ultrasound for fetal spine in 8-10 weeks.     The patient complains of the following: Left sciatica.  We'll schedule physical therapy with [facility].     November 7, 2018- MONICA visit today; feeling well, no complaints. Has had one appt with [facility] for PT and states that she is doing her recommended stretches at home. She is still having some sciatic pain on the left side but is managing that better now. Still taking prilosec for heartburn and feels that it is well controlled. Denies dysuria, vb, LOF, constipation or headaches. Needs 1 hour glucose test in 3 weeks; drink and instructions given. F/U for MONICA visit in 3 weeks or sooner PRN.     November 27, 2018 1 hour Glucola 146.  Hemoglobin 11.2 and hematocrit 32.9 with MCV 91.4.  We'll place on iron.  Schedule 3 hour GTT.  She has restless leg syndrome that 2 Tylenol at bedtime seemed to help.  Follow-up ultrasound in 2 weeks.     December 11, 2018 3 hour glucose test normal at 85/140/150/115.  Ultrasound shows single intrauterine pregnancy in the vertex position.  Fetal heart rate 141 bpm.  Estimated fetal weight 3 lbs. 9 oz. or 54th percentile.  Three-vessel umbilical cord.  HOMERO normal at 14.8     January 3, 2019 and a has a long history of depression and anxiety.  Anxiety is getting significantly worse now.  I'm placing her on Celexa 20 mg a day.  Also recommended vitamin D3 and B12 supplementation.  GBS swab performed today.     GBS negative.     January 15, 2019 ultrasound shows single intrauterine pregnancy in the cephalic position HOMERO 14.88 biophysical profile 8 out of 8 blood pressures are normal.  Her 24-hour urine protein on January 7, 2019 was normal at 55.2.     January  2019, no new complaints.  Fetal kick counts and  labor precautions.    2019, 37 weeks and 3 days.  Blood pressure 143/90.  Pulse 96.  No headaches.  No visual changes.  Cervix 2/70%/-2.  Plan for labor induction on Thursday, 2019 at 0200.     Obstetric History   #: 1, Date: , Sex: None, Weight: None, GA: None, Delivery: None, Apgar1: None, Apgar5: None, Living: None, Birth Comments: None    #: 2, Date: None, Sex: None, Weight: None, GA: None, Delivery: None, Apgar1: None, Apgar5: None, Living: None, Birth Comments: None      The following portions of the patients history were reviewed and updated as appropriate: current medications, allergies, past medical history, past surgical history, past family history, past social history and problem list .       Prenatal Information:  Prenatal Results     Initial Prenatal Labs     Test Value Reference Range Date Time    Hemoglobin 12.9 g/dL 12.0 - 15.5 g/dL 18 1054    Hematocrit 38.2 % 35.0 - 45.0 % 18 1054    Platelets 271 10*3/mm3 150 - 450 10*3/mm3 19 1207    Rubella IgG 236.0 IU/mL 0.0 - 9.9 IU/mL 18 1054      Immune  Immune 18 1054    Hepatitis B SAg Negative  Negative 18 1054    Hepatitis C Ab Negative  Negative 18 1054    RPR Non-Reactive  Non-Reactive 18 1054    ABO O   18 1054    Rh Positive   18 1054    Antibody Screen Negative   18 1054    HIV Negative  Negative 18 1054    Urine Culture No growth at 24 hours   18 1054    Gonorrhea        Chlamydia        TSH 1.150 mIU/mL 0.460 - 4.680 mIU/mL 18 0841          2nd and 3rd Trimester     Test Value Reference Range Date Time    Hemoglobin (repeated) 11.5 g/dL 12.0 - 15.5 g/dL 19 1207    Hematocrit (repeated) 33.9 % 35.0 - 45.0 % 19 1207     mg/dL 60 - 190 mg/dL 18 0848    Antibody Screen (repeated)        GTT Fasting        GTT 1 Hr        GTT 2 Hr        GTT 3 Hr         Group B Strep Negative  Negative 01/03/19 0905          Drug Screening     Test Value Reference Range Date Time    Amphetamine Screen Negative  Negative 07/26/18 1054    Barbiturate Screen Negative  Negative 07/26/18 1054    Benzodiazepine Screen Negative  Negative 07/26/18 1054    Methadone Screen Negative  Negative 07/26/18 1054    Phencyclidine Screen        Opiates Screen Negative  Negative 07/26/18 1054    THC Screen Negative  Negative 07/26/18 1054    Cocaine Screen Negative  Negative 07/26/18 1054    Propoxyphene Screen        Buprenorphine Screen        Methamphetamine Screen        Oxycodone Screen Negative  Negative 07/26/18 1054    Tricyclic Antidepressants Screen              Other (Risk screening)     Test Value Reference Range Date Time    Varicella IgG        Parvovirus IgG        CMV IgG        Cystic Fibrosis        Hemoglobin electrophoresis        NIPT        MSAFP-4        AFP (for NTD only)                  External Prenatal Results     Pregnancy Outside Results - Transcribed From Office Records - See Scanned Records For Details     Test Value Date Time    Hgb 11.5 g/dL 01/06/19 1207    Hct 33.9 % 01/06/19 1207    ABO O  07/26/18 1054    Rh Positive  07/26/18 1054    Antibody Screen Negative  07/26/18 1054    Glucose Fasting GTT       Glucose Tolerance Test 1 hour       Glucose Tolerance Test 3 hour       Gonorrhea (discrete)       Chlamydia (discrete)       RPR Non-Reactive  07/26/18 1054    VDRL       Syphilis Antibody       Rubella 236.0 IU/mL 07/26/18 1054      Immune  07/26/18 1054    HBsAg Negative  07/26/18 1054    Herpes Simplex Virus PCR       Herpes Simplex VIrus Culture       HIV Negative  07/26/18 1054    Hep C RNA Quant PCR       Hep C Antibody Negative  07/26/18 1054    AFP       Group B Strep Negative  01/03/19 0905    GBS Susceptibility to Clindamycin       GBS Susceptibility to Erythromycin       Fetal Fibronectin       Genetic Testing, Maternal Blood             Drug Screening      Test Value Date Time    Urine Drug Screen       Amphetamine Screen Negative  18 1054    Barbiturate Screen Negative  18 1054    Benzodiazepine Screen Negative  18 1054    Methadone Screen Negative  18 1054    Phencyclidine Screen       Opiates Screen Negative  18 1054    THC Screen Negative  18 1054    Cocaine Screen       Propoxyphene Screen       Buprenorphine Screen       Methamphetamine Screen       Oxycodone Screen Negative  18 1054    Tricyclic Antidepressants Screen                    Past OB History:     Obstetric History       T0      L0     SAB1   TAB0   Ectopic0   Molar0   Multiple0   Live Births0       # Outcome Date GA Lbr Leland/2nd Weight Sex Delivery Anes PTL Lv   2 Current            2012                   ALLERGIES:     Allergies   Allergen Reactions   • Lorabid [Loracarbef] Rash        Home Medications:     Prior to Admission medications    Medication Sig Start Date End Date Taking? Authorizing Provider   citalopram (CeleXA) 40 MG tablet Take 1 tablet by mouth Daily. 1/3/19  Yes Favio Sims MD   Cyanocobalamin (B-12 PO) Take  by mouth.   Yes Emergency, Nurse DOM Brand   ferrous sulfate 325 (65 FE) MG tablet Take 1 tablet by mouth 3 (Three) Times a Day With Meals. 18  Yes Favio Sims MD   loratadine (CLARITIN) 10 MG tablet Take 1 tablet by mouth Daily. 19  Yes Jd Higginbotham MD   Prenatal Multivit-Min-Fe-FA (PRE-NERY PO) Take  by mouth Daily.   Yes Emergency, Nurse Epic, RN       Past Medical History: Past Medical History:   Diagnosis Date   • Acute sinusitis    • Agoraphobia with panic attacks    • Chronic depression    • Corneal abrasion    • Dysfunction of eustachian tube    • Encounter for general counseling on prescription of oral contraceptives    • Generalized anxiety disorder    • Hemangioma     mediastinal cavernous [id number] stable      • Herpes simplex    • Iron deficiency anemia    • Laryngitis    • Malaise and  fatigue    • Moderate Vaginal discharge     reports frequent yeast infections      • Otitis media    • Ovarian cyst    • PONV (postoperative nausea and vomiting)    • Streptococcal pharyngitis    • Syncope    • Tracheitis    • Verruca plantaris       Past Surgical History Past Surgical History:   Procedure Laterality Date   • KNEE ARTHROSCOPY  2009    x2   • MEDIASTINOSCOPY  04/21/2011    Mediastinoscopy. Cavernous hemangioma. Right paratracheal mass.   • WISDOM TOOTH EXTRACTION        Family History: Family History   Problem Relation Age of Onset   • Cancer Other    • Diabetes Other    • Heart disease Other    • Hypertension Other    • Hypertension Paternal Grandmother    • Diabetes Paternal Grandmother    • Breast cancer Paternal Grandmother       Social History:  reports that  has never smoked. she has never used smokeless tobacco.   reports that she does not drink alcohol.   reports that she does not use drugs.     Review of Systems   Constitutional: Negative for activity change, appetite change, chills, diaphoresis, fatigue, fever and unexpected weight change.   Gastrointestinal: Negative for abdominal pain, constipation, diarrhea and nausea.   Genitourinary: Negative for difficulty urinating, dyspareunia, dysuria, menstrual problem, pelvic pain, urgency, vaginal bleeding, vaginal discharge and vaginal pain.   Neurological: Negative for headaches.   Psychiatric/Behavioral: Negative for dysphoric mood. The patient is not nervous/anxious.    All other systems reviewed and are negative.        Objective       Vital Signs Range for the last 24 hours  Temperature:     Temp Source:     BP: BP: (143)/(90) 143/90   Pulse:     Respirations:     SPO2:     O2 Amount (l/min):     O2 Devices     Weight: Weight:  [74.4 kg (164 lb)] 74.4 kg (164 lb)       OBGyn Exam  Physical Examination: General appearance - alert, well appearing, and in no distress and oriented to person, place, and time  Mental status - alert, oriented to  person, place, and time, normal mood, behavior, speech, dress, motor activity, and thought processes  Neck - supple, no significant adenopathy  Chest - clear to auscultation, no wheezes, rales or rhonchi, symmetric air entry, no tachypnea, retractions or cyanosis  Heart - normal rate, regular rhythm, normal S1, S2, no murmurs, rubs, clicks or gallops  Abdomen - Gravid and nontender  Extremities - peripheral pulses normal, no pedal edema, no clubbing or cyanosis        Assessment:  2. Intrauterine pregnancy at 37 weeks and 5 days on January 31, 2019 with gestational hypertension and a favorable cervix.    GBS status: Results in Past 30 Days  Result Component Current Result Ref Range Previous Result Ref Range   Group B Strep, DNA Negative (1/3/2019) Negative Not in Time Range        Plan:  5. Admit to labor and delivery on January 31, 2019 for labor induction.  6. Plan of care has been reviewed with staff and patient.  7. Risks, benefits of treatment plan have been discussed.  8. All questions have been answered.      Favio Sims MD  1/29/2019  8:48 PM      Electronically signed by Favio Sims MD at 1/29/2019  8:51 PM       Hospital Medications (all)       Dose Frequency Start End    acetaminophen (TYLENOL) tablet 650 mg 650 mg Every 4 Hours PRN 1/31/2019     Sig - Route: Take 2 tablets by mouth Every 4 (Four) Hours As Needed for Mild Pain . - Oral    Cosign for Ordering: Accepted by Favio Sims MD on 1/31/2019  7:33 PM    benzocaine (AMERICAINE) 20 % rectal ointment 1 application 1 application As Needed 1/31/2019     Sig - Route: Insert 1 application into the rectum As Needed for Hemorrhoids. - Rectal    Cosign for Ordering: Accepted by Favio Sims MD on 1/31/2019  7:33 PM    benzocaine-lanolin-aloe vera (DERMOPLAST) 20-0.5 % topical spray  - ADS Override Pull   1/31/2019 2/1/2019    Notes to Pharmacy: [facility]: cabinet override    benzocaine-lanolin-aloe vera (DERMOPLAST) 20-0.5 % topical spray  As  Needed 1/31/2019     Sig - Route: Apply  topically to the appropriate area as directed As Needed for Mild Pain  (perineal pain). - Topical    Cosign for Ordering: Accepted by Favio Sims MD on 1/31/2019  7:33 PM    bisacodyl (DULCOLAX) suppository 10 mg 10 mg Daily PRN 2/1/2019     Sig - Route: Insert 1 suppository into the rectum Daily As Needed for Constipation. - Rectal    Cosign for Ordering: Accepted by Favio Sims MD on 1/31/2019  7:33 PM    calcium carbonate (TUMS) chewable tablet 500 mg (200 mg elemental) 2 tablet 3 Times Daily PRN 1/31/2019     Sig - Route: Chew 1,000 mg 3 (Three) Times a Day As Needed for Indigestion or Heartburn. - Oral    Cosign for Ordering: Accepted by Hakan Wing MD on 1/31/2019  3:21 PM    cetirizine (zyrTEC) tablet 10 mg 10 mg Daily 1/31/2019     Sig - Route: Take 1 tablet by mouth Daily. - Oral    Cosign for Ordering: Accepted by Favio Sims MD on 1/31/2019  7:33 PM    citalopram (CeleXA) tablet 40 mg 40 mg Daily 2/1/2019     Sig - Route: Take 1 tablet by mouth Daily. - Oral    Cosign for Ordering: Accepted by Favio Sims MD on 1/31/2019  7:33 PM    docusate sodium (COLACE) capsule 100 mg 100 mg 2 Times Daily 1/31/2019     Sig - Route: Take 1 capsule by mouth 2 (Two) Times a Day. - Oral    Cosign for Ordering: Accepted by Favio Sims MD on 1/31/2019  7:33 PM    docusate sodium (COLACE) capsule 100 mg 100 mg 3 Times Daily 1/31/2019     Sig - Route: Take 1 capsule by mouth 3 (Three) Times a Day. - Oral    Cosign for Ordering: Accepted by Favio Sims MD on 1/31/2019  7:33 PM    ferrous sulfate EC tablet 325 mg 325 mg 3 Times Daily With Meals 1/31/2019     Sig - Route: Take 1 tablet by mouth 3 (Three) Times a Day With Meals. - Oral    Cosign for Ordering: Accepted by Favio Sims MD on 1/31/2019  7:33 PM    hydrocortisone (ANUSOL-HC) 2.5 % rectal cream 1 application 1 application As Needed 1/31/2019     Sig - Route: Insert 1 application into the rectum As  Needed for Hemorrhoids. - Rectal    Cosign for Ordering: Accepted by Favio Sims MD on 1/31/2019  7:33 PM    ibuprofen (ADVIL,MOTRIN) tablet 800 mg 800 mg Once 1/31/2019 1/31/2019    Sig - Route: Take 1 tablet by mouth 1 (One) Time. - Oral    Cosign for Ordering: Accepted by Favio Sims MD on 1/31/2019  3:29 PM    ibuprofen (ADVIL,MOTRIN) tablet 800 mg 800 mg Every 6 Hours PRN 2/1/2019     Sig - Route: Take 1 tablet by mouth Every 6 (Six) Hours As Needed for Mild Pain . - Oral    lactated ringers bolus 1,000 mL 1,000 mL Once 1/31/2019 1/31/2019    Sig - Route: Infuse 1,000 mL into a venous catheter 1 (One) Time. - Intravenous    lactated ringers infusion 125 mL/hr Continuous 1/31/2019     Sig - Route: Infuse 125 mL/hr into a venous catheter Continuous. - Intravenous    lanolin ointment  Every 1 Hour PRN 1/31/2019     Sig - Route: Apply  topically to the appropriate area as directed Every 1 (One) Hour As Needed for Dry Skin (nipple pain). - Topical    Cosign for Ordering: Accepted by Favio Sims MD on 1/31/2019  7:33 PM    magnesium hydroxide (MILK OF MAGNESIA) suspension 2400 mg/10mL 10 mL 10 mL Daily PRN 1/31/2019     Sig - Route: Take 10 mL by mouth Daily As Needed for Constipation. - Oral    Cosign for Ordering: Accepted by Favio Sims MD on 1/31/2019  7:33 PM    Oxytocin-Lactated Ringers (PITOCIN) 20 UNIT/L in lactated Ringer's 1000 mL IVPB 125 mL/hr Continuous PRN 1/31/2019     Sig - Route: Infuse 2.5 Units/hr into a venous catheter Continuous As Needed for Bleeding. - Intravenous    Cosign for Ordering: Accepted by Favio Sims MD on 1/31/2019  1:25 PM    pantoprazole (PROTONIX) EC tablet 40 mg 40 mg Every Morning 1/31/2019     Sig - Route: Take 1 tablet by mouth Every Morning. - Oral    pramoxine-hydrocortisone 1-1 % foam  As Needed 1/31/2019     Sig - Route: Apply  topically to the appropriate area as directed As Needed for Hemorrhoids. - Topical    Cosign for Ordering: Accepted by Favio Sims  "MD XOCHILT on 1/31/2019  7:33 PM    prenatal vitamin 27-0.8 tablet 1 tablet 1 tablet Daily 1/31/2019     Sig - Route: Take 1 tablet by mouth Daily. - Oral    Cosign for Ordering: Accepted by Favio Sims MD on 1/31/2019  7:33 PM    sodium chloride 0.9 % flush 1-10 mL 1-10 mL As Needed 1/31/2019     Sig - Route: Infuse 1-10 mL into a venous catheter As Needed for Line Care. - Intravenous    Cosign for Ordering: Accepted by Favio Sims MD on 1/31/2019  7:33 PM    Tdap (BOOSTRIX) injection 0.5 mL 0.5 mL During Hospitalization 1/31/2019 1/31/2019    Sig - Route: Inject 0.5 mL into the appropriate muscle as directed by prescriber During Hospitalization for Immunization. - Intramuscular    Cosign for Ordering: Accepted by Favio Sims MD on 1/31/2019  7:33 PM    acetaminophen (TYLENOL) tablet 650 mg (Discontinued) 650 mg Every 4 Hours PRN 1/31/2019 1/31/2019    Sig - Route: Take 2 tablets by mouth Every 4 (Four) Hours As Needed for Mild Pain  or Headache. - Oral    Reason for Discontinue: Patient Transfer    Cosign for Ordering: Accepted by Favio Sims MD on 1/31/2019  1:25 PM    carboprost (HEMABATE) injection 250 mcg (Discontinued) 250 mcg As Needed 1/31/2019 1/31/2019    Sig - Route: Inject 1 mL into the appropriate muscle as directed by prescriber As Needed (hemorrhage). - Intramuscular    Reason for Discontinue: Patient Transfer    Cosign for Ordering: Accepted by Favio Sims MD on 1/31/2019  1:25 PM    diphenhydrAMINE (BENADRYL) capsule 25 mg (Discontinued) 25 mg Nightly PRN 1/31/2019 1/31/2019    Sig - Route: Take 1 capsule by mouth At Night As Needed for Sleep. - Oral    Reason for Discontinue: Patient Transfer    Cosign for Ordering: Accepted by Favio Sims MD on 1/31/2019  1:25 PM    Linked Group 1:  \"Or\" Linked Group Details        diphenhydrAMINE (BENADRYL) injection 25 mg (Discontinued) 25 mg Nightly PRN 1/31/2019 1/31/2019    Sig - Route: Infuse 0.5 mL into a venous catheter At Night As Needed for " "Sleep. - Intravenous    Reason for Discontinue: Patient Transfer    Cosign for Ordering: Accepted by Favio Sims MD on 1/31/2019  1:25 PM    Linked Group 1:  \"Or\" Linked Group Details        ibuprofen (ADVIL,MOTRIN) tablet 600 mg (Discontinued) 600 mg Every 6 Hours PRN 1/31/2019 1/31/2019    Sig - Route: Take 1 tablet by mouth Every 6 (Six) Hours As Needed for Mild Pain . - Oral    Reason for Discontinue: Patient Transfer    Cosign for Ordering: Accepted by Favio Sims MD on 1/31/2019  1:25 PM    lidocaine PF 1% (XYLOCAINE) injection 5 mL (Discontinued) 5 mL As Needed 1/31/2019 1/31/2019    Sig - Route: Inject 5 mL into the appropriate area of the skin as directed by provider As Needed (IV starts). - Intradermal    Reason for Discontinue: Patient Transfer    methylergonovine (METHERGINE) injection 200 mcg (Discontinued) 200 mcg Once As Needed 1/31/2019 1/31/2019    Sig - Route: Inject 1 mL into the appropriate muscle as directed by prescriber 1 (One) Time As Needed (hemorrhage). - Intramuscular    Reason for Discontinue: Patient Transfer    Cosign for Ordering: Accepted by Favio Sims MD on 1/31/2019  1:25 PM    misoprostol (CYTOTEC) split tablet 25 mcg (Discontinued) 25 mcg Once 1/31/2019 1/31/2019    Sig - Route: Insert 1 quarter tablet into the vagina 1 (One) Time. - Vaginal    Reason for Discontinue: Patient Transfer    misoprostol (CYTOTEC) tablet 800 mcg (Discontinued) 800 mcg As Needed 1/31/2019 1/31/2019    Sig - Route: Insert 4 tablets into the rectum As Needed (Postpartum Hemorrhage). - Rectal    Reason for Discontinue: Patient Transfer    Cosign for Ordering: Accepted by Favio Sims MD on 1/31/2019  1:25 PM    ondansetron (ZOFRAN) injection 4 mg (Discontinued) 4 mg Every 6 Hours PRN 1/31/2019 1/31/2019    Sig - Route: Infuse 2 mL into a venous catheter Every 6 (Six) Hours As Needed for Nausea or Vomiting. - Intravenous    Reason for Discontinue: Patient Transfer    Cosign for Ordering: " "Accepted by Favio Sims MD on 1/31/2019  1:25 PM    Linked Group 2:  \"Or\" Linked Group Details        ondansetron (ZOFRAN) tablet 4 mg (Discontinued) 4 mg Every 6 Hours PRN 1/31/2019 1/31/2019    Sig - Route: Take 1 tablet by mouth Every 6 (Six) Hours As Needed for Nausea or Vomiting. - Oral    Reason for Discontinue: Patient Transfer    Cosign for Ordering: Accepted by Favio Sims MD on 1/31/2019  1:25 PM    Linked Group 2:  \"Or\" Linked Group Details        ondansetron ODT (ZOFRAN-ODT) disintegrating tablet 4 mg (Discontinued) 4 mg Every 6 Hours PRN 1/31/2019 1/31/2019    Sig - Route: Take 1 tablet by mouth Every 6 (Six) Hours As Needed for Nausea or Vomiting. - Oral    Reason for Discontinue: Patient Transfer    Cosign for Ordering: Accepted by Favio Sims MD on 1/31/2019  1:25 PM    Linked Group 2:  \"Or\" Linked Group Details        Oxytocin-Sodium Chloride (PITOCIN) 30-0.9 UT/500ML-% infusion solution (Discontinued) 2-30 aleena-units/min Titrated 1/31/2019 1/31/2019    Sig - Route: Infuse 0.002-0.03 Units/min into a venous catheter Dose Adjusted By Provider As Needed. - Intravenous    Reason for Discontinue: Patient Transfer    sodium chloride 0.9 % flush 3 mL (Discontinued) 3 mL Every 12 Hours Scheduled 1/31/2019 1/31/2019    Sig - Route: Infuse 3 mL into a venous catheter Every 12 (Twelve) Hours. - Intravenous    Reason for Discontinue: Patient Transfer    sodium chloride 0.9 % flush 3-10 mL (Discontinued) 3-10 mL As Needed 1/31/2019 1/31/2019    Sig - Route: Infuse 3-10 mL into a venous catheter As Needed for Line Care. - Intravenous    Reason for Discontinue: Patient Transfer          Lab Results (last 72 hours)     Procedure Component Value Units Date/Time    CBC & Differential [[id number]] Collected:  02/01/19 0533    Specimen:  Blood Updated:  02/01/19 0646    Narrative:       The following orders were created for panel order CBC & Differential.  Procedure                               Abnormality   "       Status                     ---------                               -----------         ------                     CBC Auto Differential[[id number]]        Abnormal            Final result                 Please view results for these tests on the individual orders.    CBC Auto Differential [[id number]]  (Abnormal) Collected:  02/01/19 0533    Specimen:  Blood Updated:  02/01/19 0646     WBC 15.08 10*3/mm3      RBC 2.66 10*6/mm3      Hemoglobin 8.0 g/dL      Hematocrit 24.3 %      MCV 91.4 fL      MCH 30.1 pg      MCHC 32.9 g/dL      RDW 14.9 %      RDW-SD 49.5 fl      MPV 11.1 fL      Platelets 234 10*3/mm3      Neutrophil % 73.5 %      Lymphocyte % 17.0 %      Monocyte % 7.9 %      Eosinophil % 1.1 %      Basophil % 0.1 %      Immature Grans % 0.4 %      Neutrophils, Absolute 11.07 10*3/mm3      Lymphocytes, Absolute 2.57 10*3/mm3      Monocytes, Absolute 1.19 10*3/mm3      Eosinophils, Absolute 0.17 10*3/mm3      Basophils, Absolute 0.02 10*3/mm3      Immature Grans, Absolute 0.06 10*3/mm3     Urine Drug Screen - [[id number]]  (Normal) Collected:  01/31/19 0300    Specimen:  Urine Updated:  01/31/19 0352     Amphetamine Screen, Urine Negative     Barbiturates Screen, Urine Negative     Benzodiazepine Screen, Urine Negative     Cocaine Screen, Urine Negative     Methadone Screen, Urine Negative     Opiate Screen Negative     Oxycodone Screen, Urine Negative     THC, Screen, Urine Negative    Narrative:       Negative Thresholds For Drugs Screened in Urine:     Amphetamines          500 ng/ml  Barbiturates          200 ng/ml  Benzodiazepines       200 ng/ml  Cocaine               150 ng/ml  Methadone             300 ng/mL  Opiates               300 ng/mL  Oxycodone             100 ng/mL  THC                   20 ng/mL    The normal value for all drugs tested is negative. This report includes final unconfirmed screening results.  A positive result by this assay can be, at your request, sent to the Reference Lab  for confirmation by gas chromatography. Unconfirmed results must not be used for non-medical purposes, such as employment or legal testing. Clinical consideration should be applied to any drug of abuse test result, particularly when unconfirmed results are used.    CBC (No Diff) [[id number]]  (Abnormal) Collected:  19    Specimen:  Blood Updated:  19     WBC 13.42 10*3/mm3      RBC 3.87 10*6/mm3      Hemoglobin 11.7 g/dL      Hematocrit 34.4 %      MCV 88.9 fL      MCH 30.2 pg      MCHC 34.0 g/dL      RDW 14.5 %      RDW-SD 47.2 fl      MPV 11.3 fL      Platelets 283 10*3/mm3              Operative/Procedure Notes (last 72 hours) (Notes from 2019 10:24 AM through 2019 10:24 AM)      Favio Sims MD at 2019  1:02 PM          [facility]  Vaginal Delivery Note    Delivery     Delivery: Vaginal, Spontaneous     YOB: 2021    Time of Birth: 11:13 AM      Anesthesia: Epidural     Delivering clinician: Favio Sims       Delivery narrative:    23 y.o.  admitted to labor and delivery for induction of labor for gestational hypertension at 37w5d.  She had an uncomplicated spontaneous vaginal delivery of a viable male  infant at 11:13 AM . With maternal pushing efforts, fetal head progressed to 2+ station. Fetal deceleration was noted at that time to the 70s. With next set of contractions, the fetal head delivered RAJNI with ritgen's manuever, loose nuchal cord manually reduced. Delivery completed with anterior then posterior shoulders and corpus. Infant was transferred to maternal abdomen. Weak cry noted with warming, drying and stimulation on maternal abdomen. Cord was clamped x2 and cut and the infant was transferred to the warmer. An intact placenta with a 3VC was delivered with gentle downward traction. Inspection of the labia, vagina and perineum revealed a deep right sulcal laceration repaired with 4-0 in running locking fashion; a right labial reapproximated  with 3-0 vicryl in running fashion; 2MLL repaired in normal fashion with 3-0 vicryl. Excellent hemostasis was noted at the end of the procedure. Lap, needle and instrument counts were correct x2.     Complications  none    Infant    Findings: male  infant     Infant observations: Weight: 2778 g (6 lb 2 oz)   Length:    in  Observations/Comments:         Apgars: 4   @ 1 minute /    7   @ 5 minutes     Placenta, Cord, and Fluid    Placenta delivered  Spontaneous  at   1/31/2019 11:17 AM     Cord: 3 vessels  present.   Nuchal Cord?  yes; Number of nuchal loops present:  1    Cord blood obtained: Yes    Cord gases obtained:  No      Repair    Episiotomy: Not recorded    Lacerations: See delivery note   Estimated Blood Loss: Est. Blood Loss (mL): 400 mL(Filed from Delivery Summary) (01/31/19 1113)           Disposition  Mother to Mother Baby/Postpartum  in stable condition currently.  Baby to remains with mom  in stable condition currently.    This document has been electronically signed by Favio Sims MD on January 31, 2019 1:02 PM        Electronically signed by Favio Sims MD at 1/31/2019  2:24 PM          Physician Progress Notes (last 72 hours) (Notes from 1/29/2019 10:24 AM through 2/1/2019 10:24 AM)      Joseline Benavidez MD at 1/31/2019  3:20 PM     Attestation signed by Favio Sims MD at 1/31/2019  3:44 PM    I have seen and evaluated the patient.  I have discussed the case with the resident. I have reviewed the notes, assessment and plan, and/or procedures performed by the resident. I concur with the resident’s documentation.          This document has been electronically signed by Favio Sims MD on January 31, 2019 3:44 PM    I have reviewed the documentation above and agree.                  Emergency light pulled in bathroom by RN.   Went to evaluate patient who was sitting on toilet.  Ambulated to bathroom without issues. Got lightheaded and started vomiting. Once she started vomiting, started to  "feel severe pain \"down there\" where she was repaired. BP without evidence of hypotension at time of evaluation. Lightheadedness resolved but pain was still present. Started only after vomiting was pain free prior. Initially appeared pale, but color improved with cont monitoring. Pt was transferred to bed in wheelchair and transferred to bed with assistance. Evaluation of vagina, perineum and labia with intact sutures, no evidence of hematoma, minimal swelling to right labia more than left. Resolution of other symptoms except for perineal pain.   A:   1. s/p  at 37+5 wks with 2MLL, sulcal, labial tears  2. Likely near syncopal episode  3. Perineal pain  4. No evidence of hematoma  P:   1. 800 mg ibuprofen and 650 mg tylenol x1  2. Cont to monitor vitals  3. If pain persists in approx 1 hr will consider onetime dose of narcotics. Hold now given recent syncopal episodes.    LChoi, R3  Staff Levi    Electronically signed by Favio Sims MD at 2019  3:44 PM       "

## 2019-02-01 NOTE — PLAN OF CARE
Problem: Patient Care Overview  Goal: Plan of Care Review  Outcome: Ongoing (interventions implemented as appropriate)   02/01/19 0358   Coping/Psychosocial   Plan of Care Reviewed With patient   Plan of Care Review   Progress improving   OTHER   Outcome Summary VSS, ambulating and voiding. Pain controlled with PRN Motrin. Encouraged sitz bath and ice packs.     Goal: Individualization and Mutuality  Outcome: Ongoing (interventions implemented as appropriate)    Goal: Discharge Needs Assessment  Outcome: Ongoing (interventions implemented as appropriate)    Goal: Interprofessional Rounds/Family Conf  Outcome: Ongoing (interventions implemented as appropriate)      Problem: Postpartum (Vaginal Delivery) (Adult,Obstetrics,Pediatric)  Goal: Signs and Symptoms of Listed Potential Problems Will be Absent, Minimized or Managed (Postpartum)  Outcome: Ongoing (interventions implemented as appropriate)      Problem: Hypertensive Disorders in Pregnancy (Adult,Obstetrics,Pediatric)  Goal: Signs and Symptoms of Listed Potential Problems Will be Absent, Minimized or Managed (Hypertensive Disorders in Pregnancy)  Outcome: Ongoing (interventions implemented as appropriate)

## 2019-02-02 VITALS
DIASTOLIC BLOOD PRESSURE: 76 MMHG | SYSTOLIC BLOOD PRESSURE: 133 MMHG | HEART RATE: 97 BPM | OXYGEN SATURATION: 100 % | RESPIRATION RATE: 18 BRPM | HEIGHT: 65 IN | BODY MASS INDEX: 27.32 KG/M2 | WEIGHT: 164 LBS | TEMPERATURE: 97.9 F

## 2019-02-02 RX ORDER — IBUPROFEN 800 MG/1
800 TABLET ORAL EVERY 8 HOURS PRN
Qty: 90 TABLET | Refills: 0 | Status: SHIPPED | OUTPATIENT
Start: 2019-02-02 | End: 2019-02-21

## 2019-02-02 RX ADMIN — CITALOPRAM HYDROBROMIDE 40 MG: 40 TABLET ORAL at 08:52

## 2019-02-02 RX ADMIN — FERROUS SULFATE TAB EC 324 MG (65 MG FE EQUIVALENT) 324 MG: 324 (65 FE) TABLET DELAYED RESPONSE at 08:53

## 2019-02-02 RX ADMIN — BENZOCAINE AND LEVOMENTHOL: 200; 5 SPRAY TOPICAL at 13:12

## 2019-02-02 RX ADMIN — PANTOPRAZOLE SODIUM 40 MG: 40 TABLET, DELAYED RELEASE ORAL at 08:52

## 2019-02-02 RX ADMIN — DOCUSATE SODIUM 100 MG: 100 CAPSULE, LIQUID FILLED ORAL at 08:52

## 2019-02-02 RX ADMIN — HYDROCORTISONE 1 APPLICATION: 25 CREAM TOPICAL at 13:12

## 2019-02-02 RX ADMIN — IBUPROFEN 800 MG: 800 TABLET ORAL at 05:54

## 2019-02-02 RX ADMIN — PRENATAL VIT W/ FE FUMARATE-FA TAB 27-0.8 MG 1 TABLET: 27-0.8 TAB at 08:52

## 2019-02-02 NOTE — DISCHARGE SUMMARY
[facility]  Marychuy Aburto  : 1995  MRN: [id number]  CSN: [id number]    Discharge Summary:    Date of Admission: 2019  Date of Discharge:  2019    Admitting Diagnosis:  Cordelia IUP @ 37w5d  2. for induction of labor     Discharge Diagnosis:  1. S/P , induction of labor for gestational hypertension       History and Hospital Course:  Patient is a   who presents for induction of labor.  Her pregnancy was complicated by gestational hypertension.  Please see History and Physical for full details.       She was admitted and progressed in labor with pitocin augmentation to completely dilated. She had a vaginal delivery of a  viable male   infant who weighed 2778 g (6 lb 2 oz)  and APGARs of        APGARS  One minute Five minutes Ten minutes Fifteen minutes Twenty minutes   Skin color: 0   0             Heart rate: 2   2             Grimace: 1   2              Muscle tone: 1   2              Breathin   1              Totals: 4   7              . No immediate complications were encountered. Please see procedure note for full details.      Her postpartum course has been unremarkable. She had no signs or symptoms of acute blood loss anemia. She was ambulating well, voiding without difficulty and lochia was within normal limits.  She was stable for discharge on postpartum day 2.      Precautions and instructions were discussed with her including but not limited to maintaining a regular diet at home, practicing local hygiene, pelvic rest, and signs and symptoms to report including heavy bleeding, frequent passage of clots, fainting or dizziness, foul odor of lochia, increasing pain, fever, or any other concerns.    She was asked to follow up in the office in 4 weeks.    Discharge Disposition:home  Discharge Diet:   Diet Instructions     Diet: Regular      Discharge Diet:  Regular        regular                  Activity at Discharge:   Activity Instructions     Discharge Activity Restrictions       1) No driving for while on narcotics or until seen by me in the office.   2) May shower.  3) Do not lift / push / pull more then 25 lbs.    Pelvic Rest      Additional Activity Instructions:      Notify Physician or CNM of heavy bleeding, passing clots or foul odor to your discharge  Temp above 100.4. Burning on urination, gapping or drainage from incision or episiotomy, pain not relieved by your pain meds, redness or streaking in your breast or pain in your legs.  Take medication as prescribed  Continue taking your iron or vitamins till your refills run out or as long as you are breastfeeding  Take several rest periods during the day  Baby blues are normal, and may be present around the 3rd-4th day, if they last longer than 2-3 days contact your physician.  Pelvic Rest- No douching tampons or intercourse for 6 weeks  No lifting anything heavier than the baby  Wear a good supportive bra 24 hrs a day to prevent engorgement.  No driving the 1st 2 weeks or as long as you are taking pain meds                    Discharge Medications:       Discharge Medications      New Medications      Instructions Start Date   ibuprofen 800 MG tablet  Commonly known as:  ADVIL,MOTRIN  Notes to patient:  Next dose due at 4:00 pm   800 mg, Oral, Every 8 Hours PRN         Continue These Medications      Instructions Start Date   B-12 PO  Notes to patient:  Continue as originally prescribed   Oral      citalopram 40 MG tablet  Commonly known as:  CeleXA  Notes to patient:  Continue as originally prescribed   40 mg, Oral, Daily      ferrous sulfate 325 (65 FE) MG tablet  Notes to patient:  Continue as originally prescribed next dose due at 1200   325 mg, Oral, 3 Times Daily With Meals      loratadine 10 MG tablet  Commonly known as:  CLARITIN  Notes to patient:  Continue as originaly prescribed   10 mg, Oral, Daily      PRE- PO  Notes to patient:  Continue as originaly prescribed   Oral, Daily           Patient will restart all  home medications including prenatal vitamins.        This document has been electronically signed by Hakan Wing MD on February 2, 2019 11:57 AM

## 2019-02-02 NOTE — PLAN OF CARE
Problem: Patient Care Overview  Goal: Plan of Care Review  Outcome: Ongoing (interventions implemented as appropriate)   02/02/19 0434   Coping/Psychosocial   Plan of Care Reviewed With patient   Plan of Care Review   Progress improving   OTHER   Outcome Summary VSS. VAS 0/10. Pt requested to take Motrin every 6hr to prevent pain. Sitz bath used. Discharge planned in AM      Goal: Individualization and Mutuality  Outcome: Ongoing (interventions implemented as appropriate)    Goal: Discharge Needs Assessment  Outcome: Ongoing (interventions implemented as appropriate)      Problem: Postpartum (Vaginal Delivery) (Adult,Obstetrics,Pediatric)  Goal: Signs and Symptoms of Listed Potential Problems Will be Absent, Minimized or Managed (Postpartum)  Outcome: Ongoing (interventions implemented as appropriate)      Problem: Hypertensive Disorders in Pregnancy (Adult,Obstetrics,Pediatric)  Goal: Signs and Symptoms of Listed Potential Problems Will be Absent, Minimized or Managed (Hypertensive Disorders in Pregnancy)  Outcome: Ongoing (interventions implemented as appropriate)

## 2019-02-02 NOTE — PLAN OF CARE
Problem: Patient Care Overview  Goal: Plan of Care Review  Outcome: Ongoing (interventions implemented as appropriate)   02/01/19 5937   Coping/Psychosocial   Plan of Care Reviewed With patient;spouse   Plan of Care Review   Progress improving   OTHER   Outcome Summary VSS,ambulated soto this pm, FF firm at umbilicus, voids well, pain controlled with Motrin and repostioning and walking, will f/u with Dr Sims      Goal: Individualization and Mutuality  Outcome: Ongoing (interventions implemented as appropriate)    Goal: Discharge Needs Assessment  Outcome: Ongoing (interventions implemented as appropriate)      Problem: Postpartum (Vaginal Delivery) (Adult,Obstetrics,Pediatric)  Goal: Signs and Symptoms of Listed Potential Problems Will be Absent, Minimized or Managed (Postpartum)  Outcome: Ongoing (interventions implemented as appropriate)      Problem: Hypertensive Disorders in Pregnancy (Adult,Obstetrics,Pediatric)  Goal: Signs and Symptoms of Listed Potential Problems Will be Absent, Minimized or Managed (Hypertensive Disorders in Pregnancy)  Outcome: Ongoing (interventions implemented as appropriate)

## 2019-02-04 NOTE — PAYOR COMM NOTE
"Marychuy Love (23 y.o. Female)     Date of Birth Social Security Number Address Home Phone MRN    1995  [address] [facility] [address] [phone] [id number]    Muslim Marital Status          None Single       Admission Date Admission Type Admitting Provider Attending Provider Department, Room/Bed    19 Elective Favio Sims MD  [facility] MOTHER BABY, M756/1    Discharge Date Discharge Disposition Discharge Destination        2019 Home or Self Care              Attending Provider:  (none)   Allergies:  Lorabid [Loracarbef]    Isolation:  None   Infection:  None   Code Status:  Prior    Ht:  165.1 cm (65\")   Wt:  74.4 kg (164 lb)    Admission Cmt:  None   Principal Problem:  None                Active Insurance as of 2019     Primary Coverage     Payor Plan Insurance Group Employer/Plan Group    [employer] [place] [employer] [place]ICA[place]      Payor Plan Address Payor Plan Phone Number Payor Plan Fax Number Effective Dates    [address] [address] [phone]  2016 - None Entered    [facility] [address]       Subscriber Name Subscriber Birth Date Member ID       MARYCHUY LOVE 1995 [id number]                 Emergency Contacts      (Rel.) Home Phone Work Phone Mobile Phone    Lauren Meade (Mother) [phone] -- --    matthew crump (Friend) -- -- [phone]               Discharge Summary      Hakan Wing MD at 2019 11:57 AM          [facility]  Marychuy Love  : 1995  MRN: [id number]  CSN: [id number]    Discharge Summary:    Date of Admission: 2019  Date of Discharge:  2019    Admitting Diagnosis:  1. IUP @ 37w5d  2. for induction of labor     Discharge Diagnosis:  1. S/P , induction of labor for gestational hypertension       History and Hospital Course:  Patient is a   who presents for induction of labor.  Her pregnancy was complicated by gestational hypertension.  Please see " History and Physical for full details.       She was admitted and progressed in labor with pitocin augmentation to completely dilated. She had a vaginal delivery of a  viable male   infant who weighed 2778 g (6 lb 2 oz)  and APGARs of        APGARS  One minute Five minutes Ten minutes Fifteen minutes Twenty minutes   Skin color: 0   0             Heart rate: 2   2             Grimace: 1   2              Muscle tone: 1   2              Breathin   1              Totals: 4   7              . No immediate complications were encountered. Please see procedure note for full details.      Her postpartum course has been unremarkable. She had no signs or symptoms of acute blood loss anemia. She was ambulating well, voiding without difficulty and lochia was within normal limits.  She was stable for discharge on postpartum day 2.      Precautions and instructions were discussed with her including but not limited to maintaining a regular diet at home, practicing local hygiene, pelvic rest, and signs and symptoms to report including heavy bleeding, frequent passage of clots, fainting or dizziness, foul odor of lochia, increasing pain, fever, or any other concerns.    She was asked to follow up in the office in 4 weeks.    Discharge Disposition:home  Discharge Diet:   Diet Instructions     Diet: Regular      Discharge Diet:  Regular        regular                  Activity at Discharge:   Activity Instructions     Discharge Activity Restrictions      1) No driving for while on narcotics or until seen by me in the office.   2) May shower.  3) Do not lift / push / pull more then 25 lbs.    Pelvic Rest      Additional Activity Instructions:      Notify Physician or CNM of heavy bleeding, passing clots or foul odor to your discharge  Temp above 100.4. Burning on urination, gapping or drainage from incision or episiotomy, pain not relieved by your pain meds, redness or streaking in your breast or pain in your legs.  Take medication  as prescribed  Continue taking your iron or vitamins till your refills run out or as long as you are breastfeeding  Take several rest periods during the day  Baby blues are normal, and may be present around the 3rd-4th day, if they last longer than 2-3 days contact your physician.  Pelvic Rest- No douching tampons or intercourse for 6 weeks  No lifting anything heavier than the baby  Wear a good supportive bra 24 hrs a day to prevent engorgement.  No driving the 1st 2 weeks or as long as you are taking pain meds                    Discharge Medications:       Discharge Medications      New Medications      Instructions Start Date   ibuprofen 800 MG tablet  Commonly known as:  ADVILMOTRIN  Notes to patient:  Next dose due at 4:00 pm   800 mg, Oral, Every 8 Hours PRN         Continue These Medications      Instructions Start Date   B-12 PO  Notes to patient:  Continue as originally prescribed   Oral      citalopram 40 MG tablet  Commonly known as:  CeleXA  Notes to patient:  Continue as originally prescribed   40 mg, Oral, Daily      ferrous sulfate 325 (65 FE) MG tablet  Notes to patient:  Continue as originally prescribed next dose due at 1200   325 mg, Oral, 3 Times Daily With Meals      loratadine 10 MG tablet  Commonly known as:  CLARITIN  Notes to patient:  Continue as originaly prescribed   10 mg, Oral, Daily      PRE- PO  Notes to patient:  Continue as originaly prescribed   Oral, Daily           Patient will restart all home medications including prenatal vitamins.        This document has been electronically signed by Hakan Wing MD on 2019 11:57 AM      Electronically signed by Hakan Wing MD at 2019 12:01 PM

## 2019-02-14 ENCOUNTER — OFFICE VISIT (OUTPATIENT)
Dept: OBSTETRICS AND GYNECOLOGY | Facility: CLINIC | Age: 24
End: 2019-02-14

## 2019-02-14 VITALS
SYSTOLIC BLOOD PRESSURE: 119 MMHG | HEART RATE: 74 BPM | WEIGHT: 143 LBS | BODY MASS INDEX: 23.82 KG/M2 | DIASTOLIC BLOOD PRESSURE: 70 MMHG | HEIGHT: 65 IN

## 2019-02-14 DIAGNOSIS — N30.00 ACUTE CYSTITIS WITHOUT HEMATURIA: Primary | ICD-10-CM

## 2019-02-14 DIAGNOSIS — Z30.09 ENCOUNTER FOR OTHER GENERAL COUNSELING OR ADVICE ON CONTRACEPTION: ICD-10-CM

## 2019-02-14 PROBLEM — O36.5990 IUGR (INTRAUTERINE GROWTH RESTRICTION) AFFECTING CARE OF MOTHER: Status: RESOLVED | Noted: 2019-01-07 | Resolved: 2019-02-14

## 2019-02-14 PROBLEM — O99.343 ANXIETY IN PREGNANCY IN THIRD TRIMESTER, ANTEPARTUM: Status: RESOLVED | Noted: 2019-01-22 | Resolved: 2019-02-14

## 2019-02-14 PROBLEM — O99.013 ANEMIA DURING PREGNANCY IN THIRD TRIMESTER: Status: RESOLVED | Noted: 2018-11-27 | Resolved: 2019-02-14

## 2019-02-14 PROBLEM — O13.9 GESTATIONAL HYPERTENSION: Status: RESOLVED | Noted: 2019-01-31 | Resolved: 2019-02-14

## 2019-02-14 PROBLEM — F41.9 ANXIETY IN PREGNANCY IN THIRD TRIMESTER, ANTEPARTUM: Status: RESOLVED | Noted: 2019-01-22 | Resolved: 2019-02-14

## 2019-02-14 PROBLEM — O13.3 GESTATIONAL HYPERTENSION, THIRD TRIMESTER: Status: RESOLVED | Noted: 2019-01-06 | Resolved: 2019-02-14

## 2019-02-14 PROCEDURE — 99024 POSTOP FOLLOW-UP VISIT: CPT | Performed by: OBSTETRICS & GYNECOLOGY

## 2019-02-14 RX ORDER — NITROFURANTOIN 25; 75 MG/1; MG/1
100 CAPSULE ORAL 2 TIMES DAILY
Qty: 14 CAPSULE | Refills: 0 | Status: SHIPPED | OUTPATIENT
Start: 2019-02-14 | End: 2019-02-21

## 2019-02-14 RX ORDER — PHENAZOPYRIDINE HYDROCHLORIDE 200 MG/1
200 TABLET, FILM COATED ORAL 3 TIMES DAILY PRN
Qty: 29 TABLET | Refills: 0 | Status: SHIPPED | OUTPATIENT
Start: 2019-02-14 | End: 2019-02-21

## 2019-02-15 NOTE — PROGRESS NOTES
Subjective   Chief Complaint   Patient presents with   • Postpartum Care     Marychuy Aburto is a 23 y.o. year old  presenting for a postpartum visit.  She had a vaginal delivery 2019 after being admitted for induction of labor for gestational hypertension at 37 weeks and 5 days.  She had a spontaneous vaginal delivery of a 6 pound 2 ounce male infant named Deanna.    Obstetric History  #: 1, Date: , SAB    #: 2, Date: 19, Sex: Male, Weight: 2778 g (6 lb 2 oz), GA: 37w5d, Delivery: Vaginal, Spontaneous, Apgar1: 4, Apgar5: 7, named Deanna.      Since delivery she has not been sexually active.  She does not have concerns about post-partum blues/depression.   She is bottle feeding  She desires IUD.    2019 she has dysuria and has a urinary tract infection.  We will treat her with Macrobid and Pyridium.  She desires to see Valeria in 4 weeks for IUD placement.    Outpatient Medications Prior to Visit   Medication Sig Dispense Refill   • citalopram (CeleXA) 40 MG tablet Take 1 tablet by mouth Daily. 30 tablet 12   • Cyanocobalamin (B-12 PO) Take  by mouth.     • ferrous sulfate 325 (65 FE) MG tablet Take 1 tablet by mouth 3 (Three) Times a Day With Meals. 90 tablet 11   • ibuprofen (ADVIL,MOTRIN) 800 MG tablet Take 1 tablet by mouth Every 8 (Eight) Hours As Needed for Mild Pain  or Moderate Pain  for up to 90 doses. 90 tablet 0   • loratadine (CLARITIN) 10 MG tablet Take 1 tablet by mouth Daily. 30 tablet 0   • Prenatal Multivit-Min-Fe-FA (PRE- PO) Take  by mouth Daily.       No facility-administered medications prior to visit.        The following portions of the patient's history were reviewed and updated as appropriate:  problem list, current medications, allergies, past family history, past medical history, past social history and past surgical history    Social History    Tobacco Use      Smoking status: Never Smoker      Smokeless tobacco: Never Used    Review of Systems  "  Constitutional: Negative for activity change, appetite change, chills, diaphoresis, fatigue, fever and unexpected weight change.   Gastrointestinal: Negative for abdominal pain, constipation, diarrhea and nausea.   Genitourinary: Positive for dysuria and urgency. Negative for difficulty urinating, dyspareunia, menstrual problem, pelvic pain, vaginal bleeding, vaginal discharge and vaginal pain.   Neurological: Negative for headaches.   Psychiatric/Behavioral: Negative for dysphoric mood. The patient is not nervous/anxious.    All other systems reviewed and are negative.        Objective   /70   Pulse 74   Ht 165.1 cm (65\")   Wt 64.9 kg (143 lb)   LMP 05/12/2018 (Exact Date)   BMI 23.80 kg/m²     General:  well developed; well nourished  no acute distress   Abdomen: soft, non-tender; no masses  no umbilical or inguinal hernias are present  no hepato-splenomegaly   Pelvis: Not performed.        :  Problems Addressed this Visit     None      Visit Diagnoses     Acute cystitis without hematuria    -  Primary    Relevant Medications    nitrofurantoin, macrocrystal-monohydrate, (MACROBID) 100 MG capsule    phenazopyridine (PYRIDIUM) 200 MG tablet    Encounter for other general counseling or advice on contraception        Encounter for postpartum visit               New Medications Ordered This Visit   Medications   • nitrofurantoin, macrocrystal-monohydrate, (MACROBID) 100 MG capsule     Sig: Take 1 capsule by mouth 2 (Two) Times a Day for 7 days.     Dispense:  14 capsule     Refill:  0   • phenazopyridine (PYRIDIUM) 200 MG tablet     Sig: Take 1 tablet by mouth 3 (Three) Times a Day As Needed for bladder spasms.     Dispense:  29 tablet     Refill:  0       Plan   1. Macrobid and Pyridium.  2. Follow-up in 4 weeks with Valeria for IUD placement.  Follow-up sooner as needed.         This note was electronically signed.    Favio Sims MD  February 14, 2019  "

## 2019-02-21 ENCOUNTER — OFFICE VISIT (OUTPATIENT)
Dept: FAMILY MEDICINE CLINIC | Facility: CLINIC | Age: 24
End: 2019-02-21

## 2019-02-21 VITALS
BODY MASS INDEX: 23.74 KG/M2 | HEIGHT: 65 IN | WEIGHT: 142.5 LBS | DIASTOLIC BLOOD PRESSURE: 80 MMHG | SYSTOLIC BLOOD PRESSURE: 102 MMHG

## 2019-02-21 DIAGNOSIS — L70.8 OTHER ACNE: Primary | ICD-10-CM

## 2019-02-21 DIAGNOSIS — L70.0 CYSTIC ACNE: ICD-10-CM

## 2019-02-21 PROCEDURE — 99213 OFFICE O/P EST LOW 20 MIN: CPT | Performed by: NURSE PRACTITIONER

## 2019-02-21 RX ORDER — DOXYCYCLINE 100 MG/1
100 CAPSULE ORAL 2 TIMES DAILY
Qty: 24 CAPSULE | Refills: 1 | Status: SHIPPED | OUTPATIENT
Start: 2019-02-21 | End: 2019-05-01

## 2019-02-21 RX ORDER — CLINDAMYCIN AND BENZOYL PEROXIDE 10; 50 MG/G; MG/G
GEL TOPICAL 2 TIMES DAILY
Qty: 50 G | Refills: 5 | Status: SHIPPED | OUTPATIENT
Start: 2019-02-21 | End: 2019-05-01

## 2019-02-21 NOTE — PROGRESS NOTES
"  Chief Complaint   Patient presents with   • Follow-up     referral to see Derm for Accutane   • Skin Problem     right leg     Subjective   Marychuy Aburto is a 23 y.o. female.     Skin Problem   This is a recurrent problem. The current episode started more than 1 year ago. The problem occurs daily. The problem has been rapidly worsening. Associated symptoms include a rash. Pertinent negatives include no abdominal pain, anorexia, arthralgias, change in bowel habit, chest pain, congestion, fatigue, fever, joint swelling, nausea, neck pain, sore throat, swollen glands, urinary symptoms, vertigo, visual change, vomiting or weakness. Associated symptoms comments: Cystic acne . Treatments tried: doxycycline, topical creams, monthly facials , meds  The treatment provided mild relief.        The following portions of the patient's history were reviewed and updated as appropriate: allergies, current medications, past social history and problem list.    Review of Systems   Constitutional: Negative.  Negative for fatigue and fever.   HENT: Negative.  Negative for congestion and sore throat.    Eyes: Negative.    Respiratory: Negative.    Cardiovascular: Negative.  Negative for chest pain, palpitations and leg swelling.   Gastrointestinal: Negative for abdominal pain, anorexia, change in bowel habit, nausea and vomiting.   Endocrine: Negative.    Genitourinary: Negative.    Musculoskeletal: Negative.  Negative for arthralgias, joint swelling and neck pain.   Skin: Positive for rash. Negative for color change, pallor and wound.        Adult acne    Allergic/Immunologic: Negative.    Neurological: Negative.  Negative for vertigo and weakness.   Hematological: Negative.  Negative for adenopathy. Does not bruise/bleed easily.   Psychiatric/Behavioral: Negative.  Negative for agitation and behavioral problems.       Objective   /80   Ht 165.1 cm (65\")   Wt 64.6 kg (142 lb 8 oz)   LMP 05/12/2018 (Exact Date)   BMI " 23.71 kg/m²   Physical Exam   Constitutional: She appears well-developed and well-nourished.   HENT:   Head: Normocephalic and atraumatic.   Eyes: EOM are normal. Pupils are equal, round, and reactive to light.   Neck: Normal range of motion. Neck supple.   Cardiovascular: Normal rate.   Pulmonary/Chest: Effort normal and breath sounds normal.   Abdominal: Soft.   Neurological: She is alert.   Skin: Skin is warm. Rash noted.   Acne face -cystic throughout face-neck included   Nursing note and vitals reviewed.      Assessment/Plan   Problem List Items Addressed This Visit        Musculoskeletal and Integument    Other acne - Primary    Relevant Orders    Ambulatory Referral to Dermatology    Cystic acne    Relevant Orders    Ambulatory Referral to Dermatology           New Medications Ordered This Visit   Medications   • doxycycline (MONODOX) 100 MG capsule     Sig: Take 1 capsule by mouth 2 (Two) Times a Day.     Dispense:  24 capsule     Refill:  1   • clindamycin-benzoyl peroxide (BENZACLIN) 1-5 % gel     Sig: Apply  topically to the appropriate area as directed 2 (Two) Times a Day.     Dispense:  50 g     Refill:  5      patient has tried and failed on multiple meds for acne-over 3 years-doxy, topical meds-facials, otc meds no relief-symptoms are worsening-would benefit from accutane -referral to derm made

## 2019-03-12 ENCOUNTER — OFFICE VISIT (OUTPATIENT)
Dept: OBSTETRICS AND GYNECOLOGY | Facility: CLINIC | Age: 24
End: 2019-03-12

## 2019-03-12 VITALS
WEIGHT: 146 LBS | HEIGHT: 65 IN | BODY MASS INDEX: 24.32 KG/M2 | DIASTOLIC BLOOD PRESSURE: 60 MMHG | SYSTOLIC BLOOD PRESSURE: 104 MMHG

## 2019-03-12 DIAGNOSIS — Z30.014 ENCOUNTER FOR INITIAL PRESCRIPTION OF INTRAUTERINE CONTRACEPTIVE DEVICE: ICD-10-CM

## 2019-03-12 PROCEDURE — 58300 INSERT INTRAUTERINE DEVICE: CPT | Performed by: NURSE PRACTITIONER

## 2019-03-12 PROCEDURE — 99024 POSTOP FOLLOW-UP VISIT: CPT | Performed by: NURSE PRACTITIONER

## 2019-03-12 NOTE — PROGRESS NOTES
"Subjective   Chief Complaint   Patient presents with   • Postpartum Care     Marychuy Aburto is a 23 y.o. year old  presenting to be seen for her postpartum visit.  She had a vaginal delivery with a second degree tear..   Prenatal course was been benign.    Since delivery she has not been sexually active.  She does not have concerns about post-partum blues/depression.   She is bottle feeding.    The following portions of the patient's history were reviewed and updated as appropriate:problem list, current medications, allergies, past medical history, past social history and past surgical history    Social History    Tobacco Use      Smoking status: Never Smoker      Smokeless tobacco: Never Used    Review of Systems   Constitutional: Negative for activity change, appetite change, chills, diaphoresis, fatigue, fever and unexpected weight loss.   Respiratory: Negative for chest tightness and shortness of breath.    Cardiovascular: Negative for chest pain and palpitations.   Gastrointestinal: Negative for abdominal distention, abdominal pain, constipation and diarrhea.   Genitourinary: Negative for breast discharge, urinary incontinence, decreased libido, difficulty urinating, dysuria, frequency, pelvic pain, urgency, vaginal bleeding, vaginal discharge, vaginal pain, breast lump and breast pain.        Had her first period last week, ended 3 days ago.   Musculoskeletal: Negative for myalgias.   Skin: Negative for color change, dry skin and skin lesions.   Neurological: Negative for light-headedness and headache.   Psychiatric/Behavioral: Negative for agitation, dysphoric mood, sleep disturbance, depressed mood and stress. The patient is not nervous/anxious.         Objective   /60   Ht 165.1 cm (65\")   Wt 66.2 kg (146 lb)   LMP  (Exact Date)   BMI 24.30 kg/m²     General:  well developed; well nourished  no acute distress  appears stated age   Abdomen: soft, non-tender; no masses  no umbilical or " inguinal hernias are present  no hepato-splenomegaly  Normal findings: bowel sounds normal   Pelvis: Clinical staff was present for exam  External genitalia:  normal appearance of the external genitalia including Bartholin's and Collyer's glands.  :  urethral meatus normal; urethral hypermobility is absent.  Vaginal:  normal pink mucosa without prolapse or lesions.  Cervix:  normal appearance.  Uterus:  normal size, shape and consistency. anteverted; tenderness to palpation is absent;  Pelvic floor pain: there is point tenderness to palpation on the perineum;     IUD Insertion    No LMP recorded (exact date).    Date of procedure:  3/12/2019    Risks and benefits discussed? yes  All questions answered? yes  Consents given by The patient  Written consent obtained? yes    Local anesthesia used:  no    Procedure documentation:    After verifying the patient had a low probability of being pregnant and met the criteria for insertion, a speculum has placed and the cervix was cleansed with an antiseptic solution.  The anterior lip of the cervix was grasped and the uterine cavity was gently sounded. There was mild difficulty passing the sound through the cervix.  Cervical dilation did not need to be performed prior to placing the IUD.  The uterus was anteverted and sounded to 9 cms.  The Mirena was then prepared per the manufacturers instructions.    The Mirena was advanced to a point 2 cms from the fundus and then the arms were released from the sheath.  The device was advanced to the fundus and the device was released fully from the sheath.. The string was cut 3 cms in length.  Bleeding from the cervix was scant.    She tolerated the procedure without any difficulty.    Post procedure instructions: Call if any fever or excessive bleeding or pain.    Follow up needed:  PRN for problems    This note was electronically signed.    Valeria Carlisle, APRN  March 12, 2019         There are no diagnoses linked to this encounter.    No  orders of the defined types were placed in this encounter.        This note was electronically signed.    DEYA Daniel  March 12, 2019

## 2019-05-20 ENCOUNTER — TRANSCRIBE ORDERS (OUTPATIENT)
Dept: LAB | Facility: HOSPITAL | Age: 24
End: 2019-05-20

## 2019-05-20 ENCOUNTER — APPOINTMENT (OUTPATIENT)
Dept: LAB | Facility: HOSPITAL | Age: 24
End: 2019-05-20

## 2019-05-20 DIAGNOSIS — L70.9 ACNE, UNSPECIFIED ACNE TYPE: ICD-10-CM

## 2019-05-20 DIAGNOSIS — Z79.899 ENCOUNTER FOR LONG-TERM (CURRENT) USE OF HIGH-RISK MEDICATION: Primary | ICD-10-CM

## 2019-05-20 LAB
ALBUMIN SERPL-MCNC: 4.5 G/DL (ref 3.5–5.2)
ALP SERPL-CCNC: 80 U/L (ref 39–117)
ALT SERPL W P-5'-P-CCNC: 14 U/L (ref 1–33)
AST SERPL-CCNC: 20 U/L (ref 1–32)
B-HCG UR QL: NEGATIVE
BASOPHILS # BLD AUTO: 0.03 10*3/MM3 (ref 0–0.2)
BASOPHILS NFR BLD AUTO: 0.4 % (ref 0–1.5)
BILIRUB CONJ SERPL-MCNC: <0.2 MG/DL (ref 0.2–0.3)
BILIRUB INDIRECT SERPL-MCNC: ABNORMAL MG/DL
BILIRUB SERPL-MCNC: 0.4 MG/DL (ref 0.2–1.2)
CHOLEST SERPL-MCNC: 207 MG/DL (ref 0–200)
DEPRECATED RDW RBC AUTO: 49.5 FL (ref 37–54)
EOSINOPHIL # BLD AUTO: 0.15 10*3/MM3 (ref 0–0.4)
EOSINOPHIL NFR BLD AUTO: 1.9 % (ref 0.3–6.2)
ERYTHROCYTE [DISTWIDTH] IN BLOOD BY AUTOMATED COUNT: 16 % (ref 12.3–15.4)
HCT VFR BLD AUTO: 36.4 % (ref 34–46.6)
HDLC SERPL-MCNC: 56 MG/DL (ref 40–60)
HGB BLD-MCNC: 11.3 G/DL (ref 12–15.9)
IMM GRANULOCYTES # BLD AUTO: 0.02 10*3/MM3 (ref 0–0.05)
IMM GRANULOCYTES NFR BLD AUTO: 0.3 % (ref 0–0.5)
LDLC SERPL CALC-MCNC: 141 MG/DL (ref 0–100)
LDLC/HDLC SERPL: 2.51 {RATIO}
LYMPHOCYTES # BLD AUTO: 1.56 10*3/MM3 (ref 0.7–3.1)
LYMPHOCYTES NFR BLD AUTO: 19.9 % (ref 19.6–45.3)
MCH RBC QN AUTO: 26.4 PG (ref 26.6–33)
MCHC RBC AUTO-ENTMCNC: 31 G/DL (ref 31.5–35.7)
MCV RBC AUTO: 85 FL (ref 79–97)
MONOCYTES # BLD AUTO: 0.57 10*3/MM3 (ref 0.1–0.9)
MONOCYTES NFR BLD AUTO: 7.3 % (ref 5–12)
NEUTROPHILS # BLD AUTO: 5.49 10*3/MM3 (ref 1.7–7)
NEUTROPHILS NFR BLD AUTO: 70.2 % (ref 42.7–76)
NRBC BLD AUTO-RTO: 0 /100 WBC (ref 0–0.2)
PLATELET # BLD AUTO: 386 10*3/MM3 (ref 140–450)
PMV BLD AUTO: 11.5 FL (ref 6–12)
PROT SERPL-MCNC: 6.9 G/DL (ref 6–8.5)
RBC # BLD AUTO: 4.28 10*6/MM3 (ref 3.77–5.28)
TRIGL SERPL-MCNC: 52 MG/DL (ref 0–150)
VLDLC SERPL-MCNC: 10.4 MG/DL (ref 5–40)
WBC NRBC COR # BLD: 7.82 10*3/MM3 (ref 3.4–10.8)

## 2019-05-20 PROCEDURE — 85025 COMPLETE CBC W/AUTO DIFF WBC: CPT | Performed by: NURSE PRACTITIONER

## 2019-05-20 PROCEDURE — 81025 URINE PREGNANCY TEST: CPT | Performed by: NURSE PRACTITIONER

## 2019-05-20 PROCEDURE — 36415 COLL VENOUS BLD VENIPUNCTURE: CPT | Performed by: NURSE PRACTITIONER

## 2019-05-20 PROCEDURE — 80076 HEPATIC FUNCTION PANEL: CPT | Performed by: NURSE PRACTITIONER

## 2019-05-20 PROCEDURE — 80061 LIPID PANEL: CPT | Performed by: NURSE PRACTITIONER

## 2019-07-01 ENCOUNTER — TELEPHONE (OUTPATIENT)
Dept: FAMILY MEDICINE CLINIC | Facility: CLINIC | Age: 24
End: 2019-07-01

## 2019-07-01 NOTE — TELEPHONE ENCOUNTER
Her last cholesterol checked in may wasn't bad-not enough I would worry about the last levels-I would need to see the newest numbers and go from here-make sure she is eating fresh, fruits and vegetables shalini while on the accutane.

## 2019-07-01 NOTE — TELEPHONE ENCOUNTER
"Marychuy called and said she has been seeing dermatologist in [place] and has been on Accutane since March so she has to have labs once a month.  She said when she had labs before she started the med, she was told that her cholesterol was \"through the roof\".  She said it is still high with her last round of labs and was told that she needs to talk to Caroline.  She asked if Caroline would call her.  [phone]  "

## 2019-07-24 NOTE — THERAPY DISCHARGE NOTE
Outpatient Physical Therapy Discharge Summary  [facility]       Patient Name: Marychuy Aburto  : 1995  MRN: [id number]    Today's Date: 2019    Visit Dx:    ICD-10-CM ICD-9-CM   1. Pelvic pain in pregnancy O26.899 646.80    R10.2 625.9   2. Back pain in pregnancy O99.89 646.80    M54.9 724.5           OP PT Discharge Summary  Date of Discharge: 19  Reason for Discharge: All goals achieved  Outcomes Achieved: Able to achieve all goals within established timeline  Discharge Destination: Home with home program  Discharge Instructions/Additional Comments: continuation of HEP as assigned      Time Calculation: 4989-1876                     Loan Valderrama, PT  2019

## 2019-10-04 ENCOUNTER — TELEPHONE (OUTPATIENT)
Dept: FAMILY MEDICINE CLINIC | Facility: CLINIC | Age: 24
End: 2019-10-04

## 2019-10-04 NOTE — TELEPHONE ENCOUNTER
PT CALLED AND STATES SHE NEEDS TO TALK TO YOU ABOUT IF SHE WILL NEED TO BE SEEN OR NOT. SHE STATES SHE WAS TAKEN OFF SOME MEDS AND NEEDS TO TALK TO YOU ABOUT THAT. PLEASE CALL HER -[phone]

## 2019-10-17 ENCOUNTER — APPOINTMENT (OUTPATIENT)
Dept: LAB | Facility: HOSPITAL | Age: 24
End: 2019-10-17

## 2019-10-17 ENCOUNTER — OFFICE VISIT (OUTPATIENT)
Dept: FAMILY MEDICINE CLINIC | Facility: CLINIC | Age: 24
End: 2019-10-17

## 2019-10-17 ENCOUNTER — HOSPITAL ENCOUNTER (OUTPATIENT)
Dept: GENERAL RADIOLOGY | Facility: HOSPITAL | Age: 24
Discharge: HOME | End: 2019-10-17
Admitting: ORTHOPAEDIC SURGERY

## 2019-10-17 ENCOUNTER — HOSPITAL ENCOUNTER (OUTPATIENT)
Dept: GENERAL RADIOLOGY | Facility: HOSPITAL | Age: 24
Discharge: HOME | End: 2019-10-17

## 2019-10-17 VITALS
DIASTOLIC BLOOD PRESSURE: 80 MMHG | HEIGHT: 64 IN | BODY MASS INDEX: 23.22 KG/M2 | SYSTOLIC BLOOD PRESSURE: 118 MMHG | WEIGHT: 136 LBS

## 2019-10-17 DIAGNOSIS — R53.81 MALAISE AND FATIGUE: Primary | ICD-10-CM

## 2019-10-17 DIAGNOSIS — M25.562 LEFT KNEE PAIN, UNSPECIFIED CHRONICITY: ICD-10-CM

## 2019-10-17 DIAGNOSIS — R53.83 MALAISE AND FATIGUE: Primary | ICD-10-CM

## 2019-10-17 LAB
25(OH)D3 SERPL-MCNC: 38.1 NG/ML (ref 30–100)
ALBUMIN SERPL-MCNC: 4.4 G/DL (ref 3.5–5.2)
ALBUMIN/GLOB SERPL: 1.6 G/DL
ALP SERPL-CCNC: 70 U/L (ref 39–117)
ALT SERPL W P-5'-P-CCNC: 8 U/L (ref 1–33)
ANION GAP SERPL CALCULATED.3IONS-SCNC: 10.9 MMOL/L (ref 5–15)
AST SERPL-CCNC: 15 U/L (ref 1–32)
BASOPHILS # BLD AUTO: 0.04 10*3/MM3 (ref 0–0.2)
BASOPHILS NFR BLD AUTO: 0.5 % (ref 0–1.5)
BILIRUB SERPL-MCNC: 0.8 MG/DL (ref 0.2–1.2)
BUN BLD-MCNC: 14 MG/DL (ref 6–20)
BUN/CREAT SERPL: 16.5 (ref 7–25)
CALCIUM SPEC-SCNC: 9.4 MG/DL (ref 8.6–10.5)
CHLORIDE SERPL-SCNC: 102 MMOL/L (ref 98–107)
CHOLEST SERPL-MCNC: 197 MG/DL (ref 0–200)
CO2 SERPL-SCNC: 25.1 MMOL/L (ref 22–29)
CREAT BLD-MCNC: 0.85 MG/DL (ref 0.57–1)
DEPRECATED RDW RBC AUTO: 47.2 FL (ref 37–54)
EOSINOPHIL # BLD AUTO: 0.18 10*3/MM3 (ref 0–0.4)
EOSINOPHIL NFR BLD AUTO: 2.4 % (ref 0.3–6.2)
ERYTHROCYTE [DISTWIDTH] IN BLOOD BY AUTOMATED COUNT: 14 % (ref 12.3–15.4)
GFR SERPL CREATININE-BSD FRML MDRD: 81 ML/MIN/1.73
GLOBULIN UR ELPH-MCNC: 2.7 GM/DL
GLUCOSE BLD-MCNC: 83 MG/DL (ref 65–99)
HCT VFR BLD AUTO: 40.7 % (ref 34–46.6)
HDLC SERPL-MCNC: 66 MG/DL (ref 40–60)
HETEROPH AB SER QL LA: NEGATIVE
HGB BLD-MCNC: 13.5 G/DL (ref 12–15.9)
IMM GRANULOCYTES # BLD AUTO: 0.03 10*3/MM3 (ref 0–0.05)
IMM GRANULOCYTES NFR BLD AUTO: 0.4 % (ref 0–0.5)
IRON 24H UR-MRATE: 123 MCG/DL (ref 37–145)
LDLC SERPL CALC-MCNC: 124 MG/DL (ref 0–100)
LDLC/HDLC SERPL: 1.88 {RATIO}
LYMPHOCYTES # BLD AUTO: 2.03 10*3/MM3 (ref 0.7–3.1)
LYMPHOCYTES NFR BLD AUTO: 27.2 % (ref 19.6–45.3)
MAGNESIUM SERPL-MCNC: 2 MG/DL (ref 1.6–2.6)
MCH RBC QN AUTO: 30.5 PG (ref 26.6–33)
MCHC RBC AUTO-ENTMCNC: 33.2 G/DL (ref 31.5–35.7)
MCV RBC AUTO: 91.9 FL (ref 79–97)
MONOCYTES # BLD AUTO: 0.53 10*3/MM3 (ref 0.1–0.9)
MONOCYTES NFR BLD AUTO: 7.1 % (ref 5–12)
NEUTROPHILS # BLD AUTO: 4.65 10*3/MM3 (ref 1.7–7)
NEUTROPHILS NFR BLD AUTO: 62.4 % (ref 42.7–76)
NRBC BLD AUTO-RTO: 0 /100 WBC (ref 0–0.2)
PLATELET # BLD AUTO: 347 10*3/MM3 (ref 140–450)
PMV BLD AUTO: 10.9 FL (ref 6–12)
POTASSIUM BLD-SCNC: 4.5 MMOL/L (ref 3.5–5.2)
PROT SERPL-MCNC: 7.1 G/DL (ref 6–8.5)
RBC # BLD AUTO: 4.43 10*6/MM3 (ref 3.77–5.28)
SODIUM BLD-SCNC: 138 MMOL/L (ref 136–145)
T3 SERPL-MCNC: 115 NG/DL (ref 80–200)
T4 FREE SERPL-MCNC: 1.21 NG/DL (ref 0.93–1.7)
TRIGL SERPL-MCNC: 33 MG/DL (ref 0–150)
TSH SERPL DL<=0.05 MIU/L-ACNC: 2.19 UIU/ML (ref 0.27–4.2)
VIT B12 BLD-MCNC: 265 PG/ML (ref 211–946)
VLDLC SERPL-MCNC: 6.6 MG/DL (ref 5–40)
WBC NRBC COR # BLD: 7.46 10*3/MM3 (ref 3.4–10.8)

## 2019-10-17 PROCEDURE — 36415 COLL VENOUS BLD VENIPUNCTURE: CPT | Performed by: NURSE PRACTITIONER

## 2019-10-17 PROCEDURE — 84480 ASSAY TRIIODOTHYRONINE (T3): CPT | Performed by: NURSE PRACTITIONER

## 2019-10-17 PROCEDURE — 73562 X-RAY EXAM OF KNEE 3: CPT

## 2019-10-17 PROCEDURE — 84439 ASSAY OF FREE THYROXINE: CPT | Performed by: NURSE PRACTITIONER

## 2019-10-17 PROCEDURE — 83540 ASSAY OF IRON: CPT | Performed by: NURSE PRACTITIONER

## 2019-10-17 PROCEDURE — 80050 GENERAL HEALTH PANEL: CPT | Performed by: NURSE PRACTITIONER

## 2019-10-17 PROCEDURE — 99213 OFFICE O/P EST LOW 20 MIN: CPT | Performed by: NURSE PRACTITIONER

## 2019-10-17 PROCEDURE — 82306 VITAMIN D 25 HYDROXY: CPT | Performed by: NURSE PRACTITIONER

## 2019-10-17 PROCEDURE — 80061 LIPID PANEL: CPT | Performed by: NURSE PRACTITIONER

## 2019-10-17 PROCEDURE — 82607 VITAMIN B-12: CPT | Performed by: NURSE PRACTITIONER

## 2019-10-17 PROCEDURE — 86308 HETEROPHILE ANTIBODY SCREEN: CPT | Performed by: NURSE PRACTITIONER

## 2019-10-17 PROCEDURE — 73560 X-RAY EXAM OF KNEE 1 OR 2: CPT

## 2019-10-17 PROCEDURE — 83735 ASSAY OF MAGNESIUM: CPT | Performed by: NURSE PRACTITIONER

## 2019-10-17 NOTE — PROGRESS NOTES
"  Chief Complaint   Patient presents with   • Hormones seem off     zero engergy   • Fatigue     Subjective   Marychuy Aburto is a 23 y.o. female.     Fatigue   This is a new problem. The current episode started in the past 7 days. The problem has been gradually worsening. Associated symptoms include fatigue and myalgias. Pertinent negatives include no abdominal pain, arthralgias, coughing, diaphoresis, fever, headaches, joint swelling, neck pain, numbness, rash, sore throat, urinary symptoms or vomiting. She has tried rest (pre workout ) for the symptoms. The treatment provided mild relief.        The following portions of the patient's history were reviewed and updated as appropriate: allergies, current medications, past social history and problem list.    Review of Systems   Constitutional: Positive for activity change, appetite change, fatigue and unexpected weight change. Negative for diaphoresis and fever.        Has been taking b12-energy drinks, orange juice    HENT: Negative.  Negative for sore throat.    Eyes: Negative.    Respiratory: Negative.  Negative for cough.    Cardiovascular: Negative.    Gastrointestinal: Negative.  Negative for abdominal pain and vomiting.   Endocrine: Negative.    Genitourinary: Negative.    Musculoskeletal: Positive for myalgias. Negative for arthralgias, back pain, gait problem, joint swelling, neck pain and neck stiffness.   Skin: Negative.  Negative for rash.   Allergic/Immunologic: Negative.  Negative for environmental allergies, food allergies and immunocompromised state.   Neurological: Negative.  Negative for dizziness, facial asymmetry, numbness and headaches.   Hematological: Negative.  Negative for adenopathy. Does not bruise/bleed easily.   Psychiatric/Behavioral: Negative.        Objective   /80   Ht 162.6 cm (64\")   Wt 61.7 kg (136 lb)   BMI 23.34 kg/m²   Physical Exam   Constitutional: She is oriented to person, place, and time. She appears " well-developed and well-nourished. No distress.   HENT:   Head: Normocephalic and atraumatic.   Right Ear: External ear normal.   Left Ear: External ear normal.   Mouth/Throat: Oropharynx is clear and moist.   Eyes: Conjunctivae and EOM are normal. Pupils are equal, round, and reactive to light.   Neck: Normal range of motion. Neck supple.   Cardiovascular: Normal rate, regular rhythm, normal heart sounds and intact distal pulses. Exam reveals no gallop and no friction rub.   No murmur heard.  Pulmonary/Chest: Effort normal and breath sounds normal. No stridor. No respiratory distress. She has no wheezes. She has no rales. She exhibits no tenderness.   Abdominal: Soft.   Musculoskeletal: Normal range of motion.   Neurological: She is alert and oriented to person, place, and time.   Skin: Skin is warm. Rash noted. She is not diaphoretic.   Acne resolved    Nursing note and vitals reviewed.      Assessment/Plan   Problem List Items Addressed This Visit        Other    Malaise and fatigue - Primary    Relevant Orders    CBC & Differential    Comprehensive Metabolic Panel    Iron    Vitamin B12    Vitamin D 25 Hydroxy    TSH    Magnesium    T3    T4, Free    Mononucleosis Screen    Lipid Panel         No orders of the defined types were placed in this encounter.      It's not just what you eat, but when you eat  Eat breakfast, and eat smaller meals throughout the day. A healthy breakfast can jumpstart your metabolism, while eating small, healthy meals (rather than the standard three large meals) keeps your energy up.   Avoid eating at night. Try to eat dinner earlier and fast for 14-16 hours until breakfast the next morning. Studies suggest that eating only when you’re most active and giving your digestive system a long break each day may help to regulate weight.     Labs today will notify of results when available.

## 2020-01-02 ENCOUNTER — OFFICE VISIT (OUTPATIENT)
Dept: FAMILY MEDICINE CLINIC | Facility: CLINIC | Age: 25
End: 2020-01-02

## 2020-01-02 ENCOUNTER — TELEPHONE (OUTPATIENT)
Dept: FAMILY MEDICINE CLINIC | Facility: CLINIC | Age: 25
End: 2020-01-02

## 2020-01-02 VITALS
DIASTOLIC BLOOD PRESSURE: 78 MMHG | SYSTOLIC BLOOD PRESSURE: 110 MMHG | BODY MASS INDEX: 23.39 KG/M2 | HEIGHT: 64 IN | WEIGHT: 137 LBS

## 2020-01-02 DIAGNOSIS — F41.9 ANXIETY: Primary | ICD-10-CM

## 2020-01-02 DIAGNOSIS — F32.A DEPRESSION, UNSPECIFIED DEPRESSION TYPE: ICD-10-CM

## 2020-01-02 PROCEDURE — 99213 OFFICE O/P EST LOW 20 MIN: CPT | Performed by: NURSE PRACTITIONER

## 2020-01-02 RX ORDER — SERTRALINE HYDROCHLORIDE 25 MG/1
25 TABLET, FILM COATED ORAL DAILY
Qty: 30 TABLET | Refills: 11 | Status: SHIPPED | OUTPATIENT
Start: 2020-01-02 | End: 2020-06-24 | Stop reason: SDUPTHER

## 2020-01-02 RX ORDER — BUSPIRONE HYDROCHLORIDE 10 MG/1
TABLET ORAL
Qty: 90 TABLET | Refills: 5 | Status: SHIPPED | OUTPATIENT
Start: 2020-01-02 | End: 2020-06-24 | Stop reason: SDUPTHER

## 2020-01-02 NOTE — PROGRESS NOTES
Chief Complaint   Patient presents with   • Anxiety   • Depression     Subjective   Marychuy Aburto is a 24 y.o. female.     Presents with anxiety following the sudden loss of her best friend this week-very stressed and cannot sleep -not eating due to anxiety     Anxiety   Presents for follow-up visit. Symptoms include excessive worry, nervous/anxious behavior and panic. Patient reports no chest pain, compulsions, confusion, decreased concentration, depressed mood, dizziness, dry mouth, feeling of choking, hyperventilation, impotence, insomnia, irritability, malaise, muscle tension, nausea, obsessions, palpitations, restlessness, shortness of breath or suicidal ideas. Symptoms occur most days. The quality of sleep is good. Nighttime awakenings: none.     Her past medical history is significant for depression. Compliance with medications is %.   Depression Patient presents with the following symptoms: excessive worry, nervousness/anxiety and panic.  Patient is not experiencing: compulsions, confusion, decreased concentration, depressed mood, dry mouth, hyperventilation, impotence, insomnia, irritability, malaise, muscle tension, obsessions, palpitations, restlessness, shortness of breath and suicidal ideas.         The following portions of the patient's history were reviewed and updated as appropriate: allergies, current medications, past social history and problem list.    Review of Systems   Constitutional: Negative.  Negative for irritability.   Eyes: Negative.    Respiratory: Negative for shortness of breath.    Cardiovascular: Negative for chest pain and palpitations.   Gastrointestinal: Negative for nausea.   Endocrine: Negative.    Genitourinary: Negative for impotence.   Neurological: Negative for dizziness.   Psychiatric/Behavioral: Negative for confusion, decreased concentration and suicidal ideas. The patient is nervous/anxious. The patient does not have insomnia.        Objective   /78    "Ht 162.6 cm (64\")   Wt 62.1 kg (137 lb)   BMI 23.52 kg/m²   Physical Exam   Constitutional: She is oriented to person, place, and time. She appears well-developed and well-nourished. No distress.   HENT:   Head: Normocephalic and atraumatic.   Right Ear: External ear normal.   Left Ear: External ear normal.   Mouth/Throat: Oropharynx is clear and moist.   Eyes: Pupils are equal, round, and reactive to light. Conjunctivae and EOM are normal.   Neck: Normal range of motion. Neck supple.   Cardiovascular: Normal rate, regular rhythm, normal heart sounds and intact distal pulses. Exam reveals no gallop and no friction rub.   No murmur heard.  Pulmonary/Chest: Effort normal and breath sounds normal. No stridor. No respiratory distress. She has no wheezes. She has no rales. She exhibits no tenderness.   Abdominal: Soft.   Musculoskeletal: Normal range of motion.   Neurological: She is alert and oriented to person, place, and time.   Skin: Skin is warm. Rash noted. She is not diaphoretic.   Acne resolved    Nursing note and vitals reviewed.      Assessment/Plan   Problem List Items Addressed This Visit        Other    Anxiety - Primary    Relevant Orders    Ambulatory Referral to Behavioral Health (Completed)    Depression    Relevant Medications    busPIRone (BUSPAR) 10 MG tablet    sertraline (ZOLOFT) 25 MG tablet    Other Relevant Orders    Ambulatory Referral to Behavioral Health (Completed)           New Medications Ordered This Visit   Medications   • busPIRone (BUSPAR) 10 MG tablet     Sig: Tid prn     Dispense:  90 tablet     Refill:  5   • sertraline (ZOLOFT) 25 MG tablet     Sig: Take 1 tablet by mouth Daily.     Dispense:  30 tablet     Refill:  11      meds as directed, refer to therapy, add buspar prn and zoloft daily as directed   Follow up if worsen   "

## 2020-01-02 NOTE — TELEPHONE ENCOUNTER
Pt was told by Caroline to work her in somewhere this afternoon or tomorrow. There isn't an opening this afternoon or tomorrow morning. Where should I put her?

## 2020-05-06 ENCOUNTER — TELEPHONE (OUTPATIENT)
Dept: OBSTETRICS AND GYNECOLOGY | Facility: CLINIC | Age: 25
End: 2020-05-06

## 2020-06-24 ENCOUNTER — OFFICE VISIT (OUTPATIENT)
Dept: FAMILY MEDICINE CLINIC | Facility: CLINIC | Age: 25
End: 2020-06-24

## 2020-06-24 VITALS
HEART RATE: 66 BPM | TEMPERATURE: 97.7 F | SYSTOLIC BLOOD PRESSURE: 120 MMHG | DIASTOLIC BLOOD PRESSURE: 80 MMHG | HEIGHT: 64 IN | BODY MASS INDEX: 23.56 KG/M2 | WEIGHT: 138 LBS

## 2020-06-24 DIAGNOSIS — F41.9 ANXIETY: Primary | ICD-10-CM

## 2020-06-24 PROCEDURE — 99213 OFFICE O/P EST LOW 20 MIN: CPT | Performed by: NURSE PRACTITIONER

## 2020-06-24 RX ORDER — SERTRALINE HYDROCHLORIDE 25 MG/1
25 TABLET, FILM COATED ORAL DAILY
Qty: 30 TABLET | Refills: 11 | Status: SHIPPED | OUTPATIENT
Start: 2020-06-24 | End: 2021-01-07

## 2020-06-24 RX ORDER — BUSPIRONE HYDROCHLORIDE 10 MG/1
TABLET ORAL
Qty: 90 TABLET | Refills: 11 | OUTPATIENT
Start: 2020-06-24 | End: 2021-05-25

## 2020-06-24 NOTE — PROGRESS NOTES
Chief Complaint   Patient presents with   • Anxiety     Subjective   Marychuy Aburto is a 24 y.o. female.     Presents with anxiety following the sudden loss of friend at [place] and then covid pandemic -having panic attacks and stressed     Anxiety   Presents for follow-up visit. Symptoms include decreased concentration, excessive worry, nervous/anxious behavior and panic. Patient reports no chest pain, compulsions, confusion, depressed mood, dizziness, dry mouth, feeling of choking, hyperventilation, impotence, insomnia, irritability, malaise, muscle tension, nausea, obsessions, palpitations, restlessness, shortness of breath or suicidal ideas. Symptoms occur most days. The quality of sleep is good. Nighttime awakenings: none.     Her past medical history is significant for depression. Compliance with medications is %.   Depression Patient presents with the following symptoms: decreased concentration, excessive worry, nervousness/anxiety and panic.  Patient is not experiencing: compulsions, confusion, depressed mood, dry mouth, hyperventilation, impotence, insomnia, irritability, malaise, muscle tension, obsessions, palpitations, restlessness, shortness of breath and suicidal ideas.         The following portions of the patient's history were reviewed and updated as appropriate: allergies, current medications, past social history and problem list.    Review of Systems   Constitutional: Negative.  Negative for fatigue, fever, irritability and unexpected weight change.   HENT: Negative.    Eyes: Negative.    Respiratory: Negative.  Negative for shortness of breath.    Cardiovascular: Negative.  Negative for chest pain and palpitations.   Gastrointestinal: Negative.  Negative for nausea.   Endocrine: Negative.    Genitourinary: Negative.  Negative for impotence.   Musculoskeletal: Negative.    Skin: Negative.  Negative for color change and pallor.   Allergic/Immunologic: Negative.    Neurological:  "Negative.  Negative for dizziness, tremors, syncope and weakness.   Hematological: Negative.  Negative for adenopathy. Does not bruise/bleed easily.   Psychiatric/Behavioral: Positive for decreased concentration. Negative for agitation, behavioral problems, confusion, dysphoric mood, hallucinations, self-injury, sleep disturbance and suicidal ideas. The patient is nervous/anxious. The patient does not have insomnia and is not hyperactive.        Objective   /80   Pulse 66   Temp 97.7 °F (36.5 °C) (Tympanic)   Ht 162.6 cm (64\")   Wt 62.6 kg (138 lb)   BMI 23.69 kg/m²   Physical Exam   Constitutional: She is oriented to person, place, and time. She appears well-developed and well-nourished. No distress.   HENT:   Head: Normocephalic and atraumatic.   Right Ear: External ear normal.   Left Ear: External ear normal.   Mouth/Throat: Oropharynx is clear and moist.   Eyes: Pupils are equal, round, and reactive to light. Conjunctivae and EOM are normal.   Neck: Normal range of motion. Neck supple.   Cardiovascular: Normal rate, regular rhythm, normal heart sounds and intact distal pulses. Exam reveals no gallop and no friction rub.   No murmur heard.  Pulmonary/Chest: Effort normal and breath sounds normal. No stridor. No respiratory distress. She has no wheezes. She has no rales. She exhibits no tenderness.   Abdominal: Soft. She exhibits no distension.   Musculoskeletal: Normal range of motion.   Neurological: She is alert and oriented to person, place, and time. She displays normal reflexes. No cranial nerve deficit or sensory deficit. Coordination normal.   Skin: Skin is warm. Rash noted. She is not diaphoretic. No erythema. No pallor.   Acne resolved    Nursing note and vitals reviewed.      Assessment/Plan   Problem List Items Addressed This Visit        Other    Anxiety - Primary           New Medications Ordered This Visit   Medications   • busPIRone (BUSPAR) 10 MG tablet     Sig: Tid prn     Dispense:  " 90 tablet     Refill:  11   • sertraline (Zoloft) 25 MG tablet     Sig: Take 1 tablet by mouth Daily.     Dispense:  30 tablet     Refill:  11       It's not just what you eat, but when you eat  Eat breakfast, and eat smaller meals throughout the day. A healthy breakfast can jumpstart your metabolism, while eating small, healthy meals (rather than the standard three large meals) keeps your energy up.   Avoid eating at night. Try to eat dinner earlier and fast for 14-16 hours until breakfast the next morning. Studies suggest that eating only when you’re most active and giving your digestive system a long break each day may help to regulate weight.     Patient understands the risks associated with this controlled medication, including tolerance and addiction.  she also agrees to only obtain this medication from me, and not from a another provider, unless that provider is covering for me in my absence.  she also agrees to be compliant in dosing, and not self adjust the dose of medication.  A signed controlled substance agreement is on file, and she has received a controlled substance education sheet at this a previous visit.  she has also signed a consent for treatment with a controlled substance as per [facility] policy. KARLIE was obtained.      Stress relief discussed in length. Consider therapy, suggest yoga, exercise, meditation -patient is agrees-will call back if worsen for referral

## 2020-12-10 ENCOUNTER — LAB (OUTPATIENT)
Dept: LAB | Facility: HOSPITAL | Age: 25
End: 2020-12-10

## 2020-12-10 ENCOUNTER — OFFICE VISIT (OUTPATIENT)
Dept: FAMILY MEDICINE CLINIC | Facility: CLINIC | Age: 25
End: 2020-12-10

## 2020-12-10 VITALS
SYSTOLIC BLOOD PRESSURE: 110 MMHG | TEMPERATURE: 98.2 F | BODY MASS INDEX: 26.95 KG/M2 | WEIGHT: 157 LBS | DIASTOLIC BLOOD PRESSURE: 80 MMHG

## 2020-12-10 DIAGNOSIS — F32.A DEPRESSION, UNSPECIFIED DEPRESSION TYPE: ICD-10-CM

## 2020-12-10 DIAGNOSIS — R53.81 MALAISE AND FATIGUE: ICD-10-CM

## 2020-12-10 DIAGNOSIS — R53.83 MALAISE AND FATIGUE: ICD-10-CM

## 2020-12-10 DIAGNOSIS — F41.9 ANXIETY: Primary | ICD-10-CM

## 2020-12-10 LAB
ALBUMIN SERPL-MCNC: 4.4 G/DL (ref 3.5–5.2)
ALBUMIN/GLOB SERPL: 1.8 G/DL
ALP SERPL-CCNC: 78 U/L (ref 39–117)
ALT SERPL W P-5'-P-CCNC: 17 U/L (ref 1–33)
ANION GAP SERPL CALCULATED.3IONS-SCNC: 6.5 MMOL/L (ref 5–15)
AST SERPL-CCNC: 24 U/L (ref 1–32)
BASOPHILS # BLD AUTO: 0.04 10*3/MM3 (ref 0–0.2)
BASOPHILS NFR BLD AUTO: 0.5 % (ref 0–1.5)
BILIRUB SERPL-MCNC: 0.5 MG/DL (ref 0–1.2)
BUN SERPL-MCNC: 16 MG/DL (ref 6–20)
BUN/CREAT SERPL: 16.5 (ref 7–25)
CALCIUM SPEC-SCNC: 9.5 MG/DL (ref 8.6–10.5)
CHLORIDE SERPL-SCNC: 104 MMOL/L (ref 98–107)
CO2 SERPL-SCNC: 27.5 MMOL/L (ref 22–29)
CREAT SERPL-MCNC: 0.97 MG/DL (ref 0.57–1)
DEPRECATED RDW RBC AUTO: 44.3 FL (ref 37–54)
EOSINOPHIL # BLD AUTO: 0.13 10*3/MM3 (ref 0–0.4)
EOSINOPHIL NFR BLD AUTO: 1.5 % (ref 0.3–6.2)
ERYTHROCYTE [DISTWIDTH] IN BLOOD BY AUTOMATED COUNT: 12.6 % (ref 12.3–15.4)
GFR SERPL CREATININE-BSD FRML MDRD: 69 ML/MIN/1.73
GLOBULIN UR ELPH-MCNC: 2.5 GM/DL
GLUCOSE SERPL-MCNC: 96 MG/DL (ref 65–99)
HCT VFR BLD AUTO: 43.1 % (ref 34–46.6)
HGB BLD-MCNC: 14 G/DL (ref 12–15.9)
IMM GRANULOCYTES # BLD AUTO: 0.03 10*3/MM3 (ref 0–0.05)
IMM GRANULOCYTES NFR BLD AUTO: 0.4 % (ref 0–0.5)
IRON 24H UR-MRATE: 82 MCG/DL (ref 37–145)
LYMPHOCYTES # BLD AUTO: 1.61 10*3/MM3 (ref 0.7–3.1)
LYMPHOCYTES NFR BLD AUTO: 18.9 % (ref 19.6–45.3)
MCH RBC QN AUTO: 31 PG (ref 26.6–33)
MCHC RBC AUTO-ENTMCNC: 32.5 G/DL (ref 31.5–35.7)
MCV RBC AUTO: 95.4 FL (ref 79–97)
MONOCYTES # BLD AUTO: 0.54 10*3/MM3 (ref 0.1–0.9)
MONOCYTES NFR BLD AUTO: 6.4 % (ref 5–12)
NEUTROPHILS NFR BLD AUTO: 6.15 10*3/MM3 (ref 1.7–7)
NEUTROPHILS NFR BLD AUTO: 72.3 % (ref 42.7–76)
NRBC BLD AUTO-RTO: 0 /100 WBC (ref 0–0.2)
PLATELET # BLD AUTO: 342 10*3/MM3 (ref 140–450)
PMV BLD AUTO: 10.8 FL (ref 6–12)
POTASSIUM SERPL-SCNC: 5.2 MMOL/L (ref 3.5–5.2)
PROT SERPL-MCNC: 6.9 G/DL (ref 6–8.5)
RBC # BLD AUTO: 4.52 10*6/MM3 (ref 3.77–5.28)
SODIUM SERPL-SCNC: 138 MMOL/L (ref 136–145)
T3 SERPL-MCNC: 109 NG/DL (ref 80–200)
T4 FREE SERPL-MCNC: 0.99 NG/DL (ref 0.93–1.7)
TSH SERPL DL<=0.05 MIU/L-ACNC: 2.52 UIU/ML (ref 0.27–4.2)
WBC # BLD AUTO: 8.5 10*3/MM3 (ref 3.4–10.8)

## 2020-12-10 PROCEDURE — 80050 GENERAL HEALTH PANEL: CPT | Performed by: NURSE PRACTITIONER

## 2020-12-10 PROCEDURE — 84480 ASSAY TRIIODOTHYRONINE (T3): CPT | Performed by: NURSE PRACTITIONER

## 2020-12-10 PROCEDURE — 84439 ASSAY OF FREE THYROXINE: CPT | Performed by: NURSE PRACTITIONER

## 2020-12-10 PROCEDURE — 36415 COLL VENOUS BLD VENIPUNCTURE: CPT | Performed by: NURSE PRACTITIONER

## 2020-12-10 PROCEDURE — 83540 ASSAY OF IRON: CPT | Performed by: NURSE PRACTITIONER

## 2020-12-10 PROCEDURE — 99214 OFFICE O/P EST MOD 30 MIN: CPT | Performed by: NURSE PRACTITIONER

## 2020-12-10 RX ORDER — ESCITALOPRAM OXALATE 10 MG/1
10 TABLET ORAL DAILY
Qty: 30 TABLET | Refills: 11 | OUTPATIENT
Start: 2020-12-10 | End: 2021-05-25

## 2020-12-10 NOTE — PROGRESS NOTES
No chief complaint on file.    Subjective   Marychuy Aburto is a 25 y.o. female.     Presents with anxiety following the sudden loss of friend at [place] and then covid pandemic -having panic attacks and stressed -feeling overwhelmed    Anxiety  Presents for follow-up visit. Symptoms include decreased concentration, excessive worry, nervous/anxious behavior and panic. Patient reports no chest pain, compulsions, confusion, depressed mood, dizziness, dry mouth, feeling of choking, hyperventilation, impotence, insomnia, irritability, malaise, muscle tension, nausea, obsessions, palpitations, restlessness, shortness of breath or suicidal ideas. Symptoms occur most days. The quality of sleep is good. Nighttime awakenings: none.     Her past medical history is significant for depression. Compliance with medications is %.   Depression  Visit Type: follow-up  Patient presents with the following symptoms: decreased concentration, excessive worry, nervousness/anxiety and panic.  Patient is not experiencing: choking sensation, compulsions, confusion, depressed mood, dry mouth, hyperventilation, impotence, insomnia, irritability, malaise, muscle tension, nausea, obsessions, palpitations, restlessness, shortness of breath and suicidal ideas.  Frequency of symptoms: most days   Severity: severe   Nighttime awakenings: none  Compliance with medications:  %  Side effects:  Weight gain  Fatigue  This is a recurrent problem. The current episode started more than 1 month ago. The problem occurs intermittently. The problem has been gradually worsening. Associated symptoms include fatigue. Pertinent negatives include no abdominal pain, anorexia, arthralgias, chest pain, chills, congestion, coughing, diaphoresis, fever, headaches, joint swelling, myalgias, nausea, neck pain, numbness or weakness. The symptoms are aggravated by stress. She has tried relaxation for the symptoms. The treatment provided mild relief.         The following portions of the patient's history were reviewed and updated as appropriate: allergies, current medications, past social history and problem list.    Review of Systems   Constitutional: Positive for activity change and fatigue. Negative for appetite change, chills, diaphoresis, fever, irritability and unexpected weight change.   HENT: Negative.  Negative for congestion, dental problem and drooling.    Eyes: Negative.  Negative for photophobia, pain, discharge, redness, itching and visual disturbance.   Respiratory: Negative.  Negative for apnea, cough, choking, chest tightness and shortness of breath.    Cardiovascular: Negative.  Negative for chest pain, palpitations and leg swelling.   Gastrointestinal: Negative.  Negative for abdominal distention, abdominal pain, anal bleeding, anorexia, blood in stool, constipation, diarrhea, nausea and rectal pain.   Endocrine: Negative.  Negative for cold intolerance, heat intolerance, polydipsia, polyphagia and polyuria.   Genitourinary: Negative.  Negative for difficulty urinating, dyspareunia and impotence.   Musculoskeletal: Negative.  Negative for arthralgias, back pain, gait problem, joint swelling, myalgias, neck pain and neck stiffness.   Skin: Negative.  Negative for color change and pallor.   Allergic/Immunologic: Negative.  Negative for environmental allergies, food allergies and immunocompromised state.   Neurological: Negative.  Negative for dizziness, tremors, seizures, syncope, speech difficulty, weakness, light-headedness, numbness and headaches.   Hematological: Negative.  Negative for adenopathy. Does not bruise/bleed easily.   Psychiatric/Behavioral: Positive for decreased concentration. Negative for agitation, behavioral problems, confusion, dysphoric mood, hallucinations, self-injury, sleep disturbance and suicidal ideas. The patient is nervous/anxious. The patient does not have insomnia and is not hyperactive.        Objective   /80    Temp 98.2 °F (36.8 °C)   Wt 71.2 kg (157 lb)   BMI 26.95 kg/m²   Physical Exam  Vitals signs and nursing note reviewed.   Constitutional:       General: She is not in acute distress.     Appearance: Normal appearance. She is not ill-appearing, toxic-appearing or diaphoretic.   HENT:      Head: Normocephalic and atraumatic.      Right Ear: Tympanic membrane normal.      Nose: Nose normal.      Mouth/Throat:      Mouth: Mucous membranes are dry.   Eyes:      Extraocular Movements: Extraocular movements intact.      Pupils: Pupils are equal, round, and reactive to light.   Neck:      Musculoskeletal: Normal range of motion and neck supple.   Cardiovascular:      Rate and Rhythm: Normal rate and regular rhythm.      Pulses: Normal pulses.      Heart sounds: No murmur. No friction rub. No gallop.    Pulmonary:      Effort: Pulmonary effort is normal.      Breath sounds: Normal breath sounds.   Abdominal:      General: Abdomen is flat. There is no distension.      Palpations: Abdomen is soft. There is no mass.      Tenderness: There is no abdominal tenderness. There is no guarding or rebound.      Hernia: No hernia is present.   Musculoskeletal: Normal range of motion.         General: No swelling, tenderness, deformity or signs of injury.      Right lower leg: No edema.      Left lower leg: No edema.   Skin:     General: Skin is warm.      Coloration: Skin is not jaundiced or pale.      Findings: No bruising, erythema, lesion or rash.   Neurological:      General: No focal deficit present.      Mental Status: She is alert.      Sensory: No sensory deficit.      Motor: No weakness.      Coordination: Coordination normal.      Gait: Gait normal.      Deep Tendon Reflexes: Reflexes normal.         Assessment/Plan   Problems Addressed this Visit        Other    Anxiety - Primary    Relevant Orders    CBC & Differential    Comprehensive Metabolic Panel    TSH    T3    T4, Free    Iron    CBC Auto Differential     Depression    Relevant Medications    escitalopram (Lexapro) 10 MG tablet    Malaise and fatigue    Relevant Orders    CBC & Differential    Comprehensive Metabolic Panel    TSH    T3    T4, Free    Iron    CBC Auto Differential      Diagnoses       Codes Comments    Anxiety    -  Primary ICD-10-CM: F41.9  ICD-9-CM: 300.00     Malaise and fatigue     ICD-10-CM: R53.81, R53.83  ICD-9-CM: 780.79     Depression, unspecified depression type     ICD-10-CM: F32.9  ICD-9-CM: 311            New Medications Ordered This Visit   Medications   • escitalopram (Lexapro) 10 MG tablet     Sig: Take 1 tablet by mouth Daily.     Dispense:  30 tablet     Refill:  11       has tried prozac and it did not help zoloft caused worsening symptoms-start back on lexapro as directed, consider therapy, labs today and will notify see back in 4 weeks as directed for follow up on lexapro         It's not just what you eat, but when you eat  Eat breakfast, and eat smaller meals throughout the day. A healthy breakfast can jumpstart your metabolism, while eating small, healthy meals (rather than the standard three large meals) keeps your energy up.   Avoid eating at night. Try to eat dinner earlier and fast for 14-16 hours until breakfast the next morning. Studies suggest that eating only when you’re most active and giving your digestive system a long break each day may help to regulate weight.

## 2021-01-07 ENCOUNTER — OFFICE VISIT (OUTPATIENT)
Dept: FAMILY MEDICINE CLINIC | Facility: CLINIC | Age: 26
End: 2021-01-07

## 2021-01-07 VITALS
SYSTOLIC BLOOD PRESSURE: 120 MMHG | WEIGHT: 160 LBS | DIASTOLIC BLOOD PRESSURE: 72 MMHG | TEMPERATURE: 97.6 F | HEIGHT: 64 IN | BODY MASS INDEX: 27.31 KG/M2

## 2021-01-07 DIAGNOSIS — F41.9 ANXIETY: Primary | ICD-10-CM

## 2021-01-07 DIAGNOSIS — F32.A DEPRESSION, UNSPECIFIED DEPRESSION TYPE: ICD-10-CM

## 2021-01-07 PROCEDURE — 99213 OFFICE O/P EST LOW 20 MIN: CPT | Performed by: NURSE PRACTITIONER

## 2021-01-07 NOTE — PROGRESS NOTES
Chief Complaint   Patient presents with   • Anxiety   • Weight Gain     weight keeps increasing     Subjective   Marychuy Aburto is a 25 y.o. female.     Presents with recheck anxiety following the sudden loss of friend at [facility] and then covid pandemic -having panic attacks and stressed -feels better with medication lexapro     Anxiety  Presents for follow-up visit. Symptoms include decreased concentration, excessive worry, nervous/anxious behavior and panic. Patient reports no chest pain, compulsions, confusion, depressed mood, dizziness, dry mouth, feeling of choking, hyperventilation, impotence, insomnia, irritability, malaise, muscle tension, nausea, obsessions, palpitations, restlessness, shortness of breath or suicidal ideas. Symptoms occur most days. The quality of sleep is good. Nighttime awakenings: none.     Her past medical history is significant for depression. Compliance with medications is %.   Depression  Visit Type: follow-up  Patient presents with the following symptoms: decreased concentration, excessive worry, nervousness/anxiety and panic.  Patient is not experiencing: choking sensation, compulsions, confusion, depressed mood, dry mouth, hyperventilation, impotence, insomnia, irritability, malaise, muscle tension, nausea, obsessions, palpitations, restlessness, shortness of breath and suicidal ideas.  Frequency of symptoms: most days   Severity: severe   Nighttime awakenings: none  Compliance with medications:  %  Side effects:  Weight gain  Fatigue  This is a recurrent problem. The current episode started more than 1 month ago. The problem occurs intermittently. The problem has been gradually improving. Associated symptoms include fatigue. Pertinent negatives include no abdominal pain, anorexia, arthralgias, chest pain, chills, congestion, coughing, diaphoresis, fever, headaches, joint swelling, myalgias, nausea, neck pain, numbness or weakness. The symptoms are aggravated by  "stress. She has tried relaxation for the symptoms. The treatment provided mild relief.        The following portions of the patient's history were reviewed and updated as appropriate: allergies, current medications, past social history and problem list.    Review of Systems   Constitutional: Positive for fatigue. Negative for chills, diaphoresis, fever and irritability.   HENT: Negative for congestion.    Eyes: Negative.    Respiratory: Negative for cough, choking and shortness of breath.    Cardiovascular: Negative for chest pain and palpitations.   Gastrointestinal: Negative for abdominal pain, anorexia and nausea.   Endocrine: Negative.    Genitourinary: Negative.  Negative for impotence.   Musculoskeletal: Negative for arthralgias, joint swelling, myalgias and neck pain.   Neurological: Negative for dizziness, weakness, numbness and headaches.   Psychiatric/Behavioral: Positive for decreased concentration. Negative for confusion and suicidal ideas. The patient is nervous/anxious. The patient does not have insomnia.        Objective   /72   Temp 97.6 °F (36.4 °C) (Tympanic)   Ht 162.6 cm (64\")   Wt 72.6 kg (160 lb)   BMI 27.46 kg/m²   Physical Exam  Vitals signs and nursing note reviewed.   Constitutional:       General: She is not in acute distress.     Appearance: Normal appearance. She is not ill-appearing, toxic-appearing or diaphoretic.   HENT:      Head: Normocephalic and atraumatic.      Right Ear: Tympanic membrane normal.      Left Ear: Tympanic membrane normal.      Nose: Nose normal.      Mouth/Throat:      Mouth: Mucous membranes are dry.   Eyes:      Extraocular Movements: Extraocular movements intact.      Pupils: Pupils are equal, round, and reactive to light.   Neck:      Musculoskeletal: Normal range of motion and neck supple.   Cardiovascular:      Rate and Rhythm: Normal rate and regular rhythm.      Pulses: Normal pulses.      Heart sounds: No murmur. No friction rub. No gallop.  "   Pulmonary:      Effort: Pulmonary effort is normal.      Breath sounds: Normal breath sounds.   Abdominal:      General: Abdomen is flat. There is no distension.      Palpations: Abdomen is soft. There is no mass.      Tenderness: There is no abdominal tenderness. There is no guarding or rebound.      Hernia: No hernia is present.   Musculoskeletal: Normal range of motion.         General: No swelling, tenderness, deformity or signs of injury.      Right lower leg: No edema.      Left lower leg: No edema.   Skin:     General: Skin is warm.      Coloration: Skin is not jaundiced or pale.      Findings: No bruising, erythema, lesion or rash.   Neurological:      General: No focal deficit present.      Mental Status: She is alert and oriented to person, place, and time.      Sensory: No sensory deficit.      Motor: No weakness.      Coordination: Coordination normal.      Gait: Gait normal.      Deep Tendon Reflexes: Reflexes normal.         Assessment/Plan   Problems Addressed this Visit        Mental Health    Anxiety - Primary    Depression      Diagnoses       Codes Comments    Anxiety    -  Primary ICD-10-CM: F41.9  ICD-9-CM: 300.00     Depression, unspecified depression type     ICD-10-CM: F32.9  ICD-9-CM: 311          No orders of the defined types were placed in this encounter.      It's not just what you eat, but when you eat  Eat breakfast, and eat smaller meals throughout the day. A healthy breakfast can jumpstart your metabolism, while eating small, healthy meals (rather than the standard three large meals) keeps your energy up.   Avoid eating at night. Try to eat dinner earlier and fast for 14-16 hours until breakfast the next morning. Studies suggest that eating only when you’re most active and giving your digestive system a long break each day may help to regulate weight.     No changes in meds at this time. Low carb, high protein, no fast foods, follow up if worsen

## 2021-01-18 ENCOUNTER — TELEPHONE (OUTPATIENT)
Dept: FAMILY MEDICINE CLINIC | Facility: CLINIC | Age: 26
End: 2021-01-18

## 2021-01-18 RX ORDER — FLUCONAZOLE 150 MG/1
TABLET ORAL
Qty: 3 TABLET | Refills: 0 | Status: SHIPPED | OUTPATIENT
Start: 2021-01-18 | End: 2021-02-05

## 2021-01-18 RX ORDER — METRONIDAZOLE 500 MG/1
500 TABLET ORAL 2 TIMES DAILY
Qty: 14 TABLET | Refills: 0 | Status: SHIPPED | OUTPATIENT
Start: 2021-01-18 | End: 2021-02-05

## 2021-01-18 NOTE — TELEPHONE ENCOUNTER
I spoke with Marychuy and she said she has a bacteria/yeast infection and she think it's caused from a bath soap and laundry detergent that she recently started using. She had started eating yogurt trying to get rid of it but it hasn't worked. Please advise

## 2021-01-18 NOTE — TELEPHONE ENCOUNTER
PATIENT CALLING IN REQUESTING A  CALL BACK TO GO OVER SOME MEDICATION CONCERNS AND SIDE EFFECTS SHE IS HAVING. PLEASE ADVISE.     CALL BACK NUMBER: [phone]

## 2021-04-13 ENCOUNTER — HOSPITAL ENCOUNTER (EMERGENCY)
Facility: HOSPITAL | Age: 26
Discharge: HOME | End: 2021-04-13
Attending: FAMILY MEDICINE | Admitting: FAMILY MEDICINE

## 2021-04-13 ENCOUNTER — APPOINTMENT (OUTPATIENT)
Dept: GENERAL RADIOLOGY | Facility: HOSPITAL | Age: 26
End: 2021-04-13

## 2021-04-13 VITALS
DIASTOLIC BLOOD PRESSURE: 62 MMHG | OXYGEN SATURATION: 99 % | HEIGHT: 64 IN | RESPIRATION RATE: 20 BRPM | SYSTOLIC BLOOD PRESSURE: 107 MMHG | BODY MASS INDEX: 28.95 KG/M2 | TEMPERATURE: 96.2 F | WEIGHT: 169.6 LBS | HEART RATE: 68 BPM

## 2021-04-13 DIAGNOSIS — R07.9 CHEST PAIN, UNSPECIFIED TYPE: Primary | ICD-10-CM

## 2021-04-13 DIAGNOSIS — F41.9 ANXIETY: ICD-10-CM

## 2021-04-13 LAB
ALBUMIN SERPL-MCNC: 4.3 G/DL (ref 3.5–5.2)
ALBUMIN/GLOB SERPL: 1.9 G/DL
ALP SERPL-CCNC: 71 U/L (ref 39–117)
ALT SERPL W P-5'-P-CCNC: 11 U/L (ref 1–33)
ANION GAP SERPL CALCULATED.3IONS-SCNC: 6 MMOL/L (ref 5–15)
AST SERPL-CCNC: 20 U/L (ref 1–32)
B-HCG UR QL: NEGATIVE
BASOPHILS # BLD AUTO: 0.03 10*3/MM3 (ref 0–0.2)
BASOPHILS NFR BLD AUTO: 0.3 % (ref 0–1.5)
BILIRUB SERPL-MCNC: 0.6 MG/DL (ref 0–1.2)
BUN SERPL-MCNC: 17 MG/DL (ref 6–20)
BUN/CREAT SERPL: 14.9 (ref 7–25)
CALCIUM SPEC-SCNC: 9.4 MG/DL (ref 8.6–10.5)
CHLORIDE SERPL-SCNC: 105 MMOL/L (ref 98–107)
CO2 SERPL-SCNC: 27 MMOL/L (ref 22–29)
CREAT SERPL-MCNC: 1.14 MG/DL (ref 0.57–1)
DEPRECATED RDW RBC AUTO: 46.8 FL (ref 37–54)
EOSINOPHIL # BLD AUTO: 0.11 10*3/MM3 (ref 0–0.4)
EOSINOPHIL NFR BLD AUTO: 1.2 % (ref 0.3–6.2)
ERYTHROCYTE [DISTWIDTH] IN BLOOD BY AUTOMATED COUNT: 13.4 % (ref 12.3–15.4)
GFR SERPL CREATININE-BSD FRML MDRD: 57 ML/MIN/1.73
GLOBULIN UR ELPH-MCNC: 2.3 GM/DL
GLUCOSE SERPL-MCNC: 93 MG/DL (ref 65–99)
HCT VFR BLD AUTO: 39.9 % (ref 34–46.6)
HGB BLD-MCNC: 13.1 G/DL (ref 12–15.9)
HOLD SPECIMEN: NORMAL
HOLD SPECIMEN: NORMAL
IMM GRANULOCYTES # BLD AUTO: 0.03 10*3/MM3 (ref 0–0.05)
IMM GRANULOCYTES NFR BLD AUTO: 0.3 % (ref 0–0.5)
LYMPHOCYTES # BLD AUTO: 2.01 10*3/MM3 (ref 0.7–3.1)
LYMPHOCYTES NFR BLD AUTO: 21.6 % (ref 19.6–45.3)
MCH RBC QN AUTO: 31.3 PG (ref 26.6–33)
MCHC RBC AUTO-ENTMCNC: 32.8 G/DL (ref 31.5–35.7)
MCV RBC AUTO: 95.2 FL (ref 79–97)
MONOCYTES # BLD AUTO: 0.66 10*3/MM3 (ref 0.1–0.9)
MONOCYTES NFR BLD AUTO: 7.1 % (ref 5–12)
NEUTROPHILS NFR BLD AUTO: 6.46 10*3/MM3 (ref 1.7–7)
NEUTROPHILS NFR BLD AUTO: 69.5 % (ref 42.7–76)
NRBC BLD AUTO-RTO: 0 /100 WBC (ref 0–0.2)
PLATELET # BLD AUTO: 316 10*3/MM3 (ref 140–450)
PMV BLD AUTO: 10 FL (ref 6–12)
POTASSIUM SERPL-SCNC: 3.8 MMOL/L (ref 3.5–5.2)
PROT SERPL-MCNC: 6.6 G/DL (ref 6–8.5)
RBC # BLD AUTO: 4.19 10*6/MM3 (ref 3.77–5.28)
SODIUM SERPL-SCNC: 138 MMOL/L (ref 136–145)
TROPONIN T SERPL-MCNC: <0.01 NG/ML (ref 0–0.03)
WBC # BLD AUTO: 9.3 10*3/MM3 (ref 3.4–10.8)
WHOLE BLOOD HOLD SPECIMEN: NORMAL
WHOLE BLOOD HOLD SPECIMEN: NORMAL

## 2021-04-13 PROCEDURE — 84484 ASSAY OF TROPONIN QUANT: CPT | Performed by: STUDENT IN AN ORGANIZED HEALTH CARE EDUCATION/TRAINING PROGRAM

## 2021-04-13 PROCEDURE — 93010 ELECTROCARDIOGRAM REPORT: CPT | Performed by: INTERNAL MEDICINE

## 2021-04-13 PROCEDURE — 81025 URINE PREGNANCY TEST: CPT | Performed by: STUDENT IN AN ORGANIZED HEALTH CARE EDUCATION/TRAINING PROGRAM

## 2021-04-13 PROCEDURE — 80053 COMPREHEN METABOLIC PANEL: CPT | Performed by: STUDENT IN AN ORGANIZED HEALTH CARE EDUCATION/TRAINING PROGRAM

## 2021-04-13 PROCEDURE — 99284 EMERGENCY DEPT VISIT MOD MDM: CPT

## 2021-04-13 PROCEDURE — 85025 COMPLETE CBC W/AUTO DIFF WBC: CPT | Performed by: STUDENT IN AN ORGANIZED HEALTH CARE EDUCATION/TRAINING PROGRAM

## 2021-04-13 PROCEDURE — 93005 ELECTROCARDIOGRAM TRACING: CPT | Performed by: FAMILY MEDICINE

## 2021-04-13 PROCEDURE — 93005 ELECTROCARDIOGRAM TRACING: CPT | Performed by: STUDENT IN AN ORGANIZED HEALTH CARE EDUCATION/TRAINING PROGRAM

## 2021-04-13 PROCEDURE — 71046 X-RAY EXAM CHEST 2 VIEWS: CPT

## 2021-04-13 RX ORDER — SODIUM CHLORIDE 0.9 % (FLUSH) 0.9 %
10 SYRINGE (ML) INJECTION AS NEEDED
Status: DISCONTINUED | OUTPATIENT
Start: 2021-04-13 | End: 2021-04-13 | Stop reason: HOSPADM

## 2021-04-13 NOTE — DISCHARGE INSTRUCTIONS
Return to ED if chest pain is getting worse, start to develop shortness of breath, palpitations, dizziness, nausea/vomiting, lightheadedness, syncope. Follow up with pcp for anxiety medication adjustment. Follow up with Dr. Hayes for further management of hemangioma.

## 2021-04-13 NOTE — ED PROVIDER NOTES
Subjective   History of Present Illness  25 yr old female with history of LONNIE presents today with complains of worsening substernal pressure like chest pain. Patient reports she has been having on and off chest pressure like pain over the last one week. The pain used to be the point where she had to pull over and catch her breath while driving. Patient went to chiropractor for adjustments as she thought the pain could be related to her lifting weights. Today however the pain is worse. She reports now it is constant, 8/10 pressure like, worse with deep breaths, radiating to her upper back between scapula region. She also has some sob, palpitations associated with it. No nausea or vomiting or diaphoresis or syncope or lightheadedness. Patient is non smoker and has healthy lifestyles. She lifts weight at the gym. Patient reports she has two large hemangiomas in her chest and was previously seen by thoracic surgery.     Review of Systems   Constitutional: Positive for fatigue. Negative for activity change, appetite change, chills, diaphoresis and fever.   HENT: Negative for congestion, trouble swallowing and voice change.    Eyes: Negative for visual disturbance.   Respiratory: Positive for chest tightness and shortness of breath. Negative for cough, choking and wheezing.    Cardiovascular: Positive for chest pain and palpitations. Negative for leg swelling.   Gastrointestinal: Negative for abdominal pain, diarrhea, nausea and vomiting.   Musculoskeletal: Negative for arthralgias, back pain and myalgias.   Skin: Negative for color change.   Neurological: Negative for dizziness, tremors, weakness, light-headedness, numbness and headaches.   Psychiatric/Behavioral: Negative for behavioral problems and confusion. The patient is nervous/anxious.        Past Medical History:   Diagnosis Date   • Acute sinusitis    • Agoraphobia with panic attacks    • Chronic depression    • Corneal abrasion    • Dysfunction of eustachian  tube    • Encounter for general counseling on prescription of oral contraceptives    • Generalized anxiety disorder    • Hemangioma     mediastinal cavernous [id number] stable      • Herpes simplex    • Iron deficiency anemia    • Laryngitis    • Malaise and fatigue    • Moderate Vaginal discharge     reports frequent yeast infections      • Otitis media    • Ovarian cyst    • PONV (postoperative nausea and vomiting)    • Streptococcal pharyngitis    • Syncope    • Tracheitis    • Verruca plantaris        Allergies   Allergen Reactions   • Anesthetics, Amide Other (See Comments)     Quit breathing.   • Lorabid [Loracarbef] Rash       Past Surgical History:   Procedure Laterality Date   • KNEE ARTHROSCOPY  2009    x2   • MEDIASTINOSCOPY  04/21/2011    Mediastinoscopy. Cavernous hemangioma. Right paratracheal mass.   • WISDOM TOOTH EXTRACTION         Family History   Problem Relation Age of Onset   • Cancer Other    • Diabetes Other    • Heart disease Other    • Hypertension Other    • Hypertension Paternal Grandmother    • Diabetes Paternal Grandmother    • Breast cancer Paternal Grandmother        Social History     Socioeconomic History   • Marital status: Single     Spouse name: Not on file   • Number of children: Not on file   • Years of education: Not on file   • Highest education level: Not on file   Tobacco Use   • Smoking status: Never Smoker   • Smokeless tobacco: Never Used   Substance and Sexual Activity   • Alcohol use: No   • Drug use: No   • Sexual activity: Yes     Partners: Male           Objective   Physical Exam  Vitals and nursing note reviewed.   Constitutional:       General: She is not in acute distress.     Appearance: She is well-developed and normal weight. She is not ill-appearing or toxic-appearing.   HENT:      Head: Normocephalic and atraumatic.   Cardiovascular:      Rate and Rhythm: Regular rhythm. Tachycardia present.      Heart sounds: Normal heart sounds.   Pulmonary:      Effort:  Pulmonary effort is normal. No tachypnea or respiratory distress.      Breath sounds: Normal breath sounds.   Chest:      Chest wall: No tenderness.   Abdominal:      General: Bowel sounds are normal.      Tenderness: There is no abdominal tenderness.   Musculoskeletal:         General: Normal range of motion.      Right lower leg: No tenderness.      Left lower leg: No tenderness.   Skin:     General: Skin is warm and dry.      Capillary Refill: Capillary refill takes less than 2 seconds.   Neurological:      General: No focal deficit present.      Mental Status: She is alert and oriented to person, place, and time.   Psychiatric:         Mood and Affect: Mood is anxious.         Procedures           ED Course      Results for orders placed or performed during the hospital encounter of 04/13/21   Troponin    Specimen: Blood   Result Value Ref Range    Troponin T <0.010 0.000 - 0.030 ng/mL   Comprehensive Metabolic Panel    Specimen: Blood   Result Value Ref Range    Glucose 93 65 - 99 mg/dL    BUN 17 6 - 20 mg/dL    Creatinine 1.14 (H) 0.57 - 1.00 mg/dL    Sodium 138 136 - 145 mmol/L    Potassium 3.8 3.5 - 5.2 mmol/L    Chloride 105 98 - 107 mmol/L    CO2 27.0 22.0 - 29.0 mmol/L    Calcium 9.4 8.6 - 10.5 mg/dL    Total Protein 6.6 6.0 - 8.5 g/dL    Albumin 4.30 3.50 - 5.20 g/dL    ALT (SGPT) 11 1 - 33 U/L    AST (SGOT) 20 1 - 32 U/L    Alkaline Phosphatase 71 39 - 117 U/L    Total Bilirubin 0.6 0.0 - 1.2 mg/dL    eGFR Non African Amer 57 (L) >60 mL/min/1.73    Globulin 2.3 gm/dL    A/G Ratio 1.9 g/dL    BUN/Creatinine Ratio 14.9 7.0 - 25.0    Anion Gap 6.0 5.0 - 15.0 mmol/L   CBC Auto Differential    Specimen: Blood   Result Value Ref Range    WBC 9.30 3.40 - 10.80 10*3/mm3    RBC 4.19 3.77 - 5.28 10*6/mm3    Hemoglobin 13.1 12.0 - 15.9 g/dL    Hematocrit 39.9 34.0 - 46.6 %    MCV 95.2 79.0 - 97.0 fL    MCH 31.3 26.6 - 33.0 pg    MCHC 32.8 31.5 - 35.7 g/dL    RDW 13.4 12.3 - 15.4 %    RDW-SD 46.8 37.0 - 54.0 fl     MPV 10.0 6.0 - 12.0 fL    Platelets 316 140 - 450 10*3/mm3    Neutrophil % 69.5 42.7 - 76.0 %    Lymphocyte % 21.6 19.6 - 45.3 %    Monocyte % 7.1 5.0 - 12.0 %    Eosinophil % 1.2 0.3 - 6.2 %    Basophil % 0.3 0.0 - 1.5 %    Immature Grans % 0.3 0.0 - 0.5 %    Neutrophils, Absolute 6.46 1.70 - 7.00 10*3/mm3    Lymphocytes, Absolute 2.01 0.70 - 3.10 10*3/mm3    Monocytes, Absolute 0.66 0.10 - 0.90 10*3/mm3    Eosinophils, Absolute 0.11 0.00 - 0.40 10*3/mm3    Basophils, Absolute 0.03 0.00 - 0.20 10*3/mm3    Immature Grans, Absolute 0.03 0.00 - 0.05 10*3/mm3    nRBC 0.0 0.0 - 0.2 /100 WBC   Pregnancy, Urine - Urine, Clean Catch    Specimen: Urine, Clean Catch   Result Value Ref Range    HCG, Urine QL Negative Negative   Light Blue Top   Result Value Ref Range    Extra Tube hold for add-on    Green Top (Gel)   Result Value Ref Range    Extra Tube Hold for add-ons.    Lavender Top   Result Value Ref Range    Extra Tube hold for add-on    Gold Top - SST   Result Value Ref Range    Extra Tube Hold for add-ons.      XR Chest 2 View    Result Date: 4/13/2021  No acute disease. Prominent right paratracheal mediastinal soft tissue density detailed above apparently represents known mediastinal cavernous hemangioma. This has significantly decreased in size compared to the older CT chest exams of 2018 and 2015. [id number] Electronically signed by:  Adolfo Decker MD  4/13/2021 4:02 PM CDT Workstation: [id number]                                         [facility]  Number of Diagnoses or Management Options  Anxiety  Chest pain, unspecified type  Diagnosis management comments: Patient presented to ED with stable vitals. She was anxious and was really worried about her chest pain. EKG did not show any acute findings. Vitals were monitored via cardiac monitoring. Laboratory work up was within normal limits. Chest xray did not show any acute findings. Patient was advised to follow up with cardiothoracic surgeon who previously saw her for  hemangiomas and follow up pcp for possible anxiety medication adjustments.        Amount and/or Complexity of Data Reviewed  Clinical lab tests: reviewed  Tests in the radiology section of CPT®: reviewed  Tests in the medicine section of CPT®: reviewed        Final diagnoses:   Chest pain, unspecified type   Anxiety       ED Disposition  ED Disposition     ED Disposition Condition Comment    Discharge Stable           Caroline Zarco, APRN  [address]  [address]  [facility] [address] [phone]    Schedule an appointment as soon as possible for a visit       Hakan Hayes MD  [address] [place] [address]   [place] [address] [phone]    Schedule an appointment as soon as possible for a visit            Medication List      No changes were made to your prescriptions during this visit.          Tory Manning MD  Resident  04/13/21 [id number]

## 2021-04-15 LAB
QT INTERVAL: 406 MS
QTC INTERVAL: 459 MS

## 2021-07-07 ENCOUNTER — LAB (OUTPATIENT)
Dept: LAB | Facility: HOSPITAL | Age: 26
End: 2021-07-07

## 2021-07-07 ENCOUNTER — OFFICE VISIT (OUTPATIENT)
Dept: FAMILY MEDICINE CLINIC | Facility: CLINIC | Age: 26
End: 2021-07-07

## 2021-07-07 VITALS
SYSTOLIC BLOOD PRESSURE: 102 MMHG | TEMPERATURE: 97.3 F | DIASTOLIC BLOOD PRESSURE: 62 MMHG | HEIGHT: 64 IN | WEIGHT: 163 LBS | BODY MASS INDEX: 27.83 KG/M2

## 2021-07-07 DIAGNOSIS — F41.9 ANXIETY: Primary | ICD-10-CM

## 2021-07-07 DIAGNOSIS — R53.83 MALAISE AND FATIGUE: ICD-10-CM

## 2021-07-07 DIAGNOSIS — F32.A DEPRESSION, UNSPECIFIED DEPRESSION TYPE: ICD-10-CM

## 2021-07-07 DIAGNOSIS — R53.81 MALAISE AND FATIGUE: ICD-10-CM

## 2021-07-07 LAB
ALBUMIN SERPL-MCNC: 4.4 G/DL (ref 3.5–5.2)
ALBUMIN/GLOB SERPL: 1.8 G/DL
ALP SERPL-CCNC: 83 U/L (ref 39–117)
ALT SERPL W P-5'-P-CCNC: 13 U/L (ref 1–33)
AMPHET+METHAMPHET UR QL: NEGATIVE
AMPHETAMINES UR QL: NEGATIVE
ANION GAP SERPL CALCULATED.3IONS-SCNC: 4 MMOL/L (ref 5–15)
AST SERPL-CCNC: 15 U/L (ref 1–32)
BARBITURATES UR QL SCN: NEGATIVE
BASOPHILS # BLD AUTO: 0.04 10*3/MM3 (ref 0–0.2)
BASOPHILS NFR BLD AUTO: 0.3 % (ref 0–1.5)
BENZODIAZ UR QL SCN: NEGATIVE
BILIRUB SERPL-MCNC: 0.5 MG/DL (ref 0–1.2)
BUN SERPL-MCNC: 13 MG/DL (ref 6–20)
BUN/CREAT SERPL: 13 (ref 7–25)
BUPRENORPHINE SERPL-MCNC: NEGATIVE NG/ML
CALCIUM SPEC-SCNC: 9.6 MG/DL (ref 8.6–10.5)
CANNABINOIDS SERPL QL: NEGATIVE
CHLORIDE SERPL-SCNC: 102 MMOL/L (ref 98–107)
CO2 SERPL-SCNC: 28 MMOL/L (ref 22–29)
COCAINE UR QL: NEGATIVE
CREAT SERPL-MCNC: 1 MG/DL (ref 0.57–1)
DEPRECATED RDW RBC AUTO: 48.2 FL (ref 37–54)
EOSINOPHIL # BLD AUTO: 0.18 10*3/MM3 (ref 0–0.4)
EOSINOPHIL NFR BLD AUTO: 1.6 % (ref 0.3–6.2)
ERYTHROCYTE [DISTWIDTH] IN BLOOD BY AUTOMATED COUNT: 13.8 % (ref 12.3–15.4)
GFR SERPL CREATININE-BSD FRML MDRD: 67 ML/MIN/1.73
GLOBULIN UR ELPH-MCNC: 2.4 GM/DL
GLUCOSE SERPL-MCNC: 100 MG/DL (ref 65–99)
HCT VFR BLD AUTO: 43.9 % (ref 34–46.6)
HGB BLD-MCNC: 14.4 G/DL (ref 12–15.9)
IMM GRANULOCYTES # BLD AUTO: 0.06 10*3/MM3 (ref 0–0.05)
IMM GRANULOCYTES NFR BLD AUTO: 0.5 % (ref 0–0.5)
LYMPHOCYTES # BLD AUTO: 2.18 10*3/MM3 (ref 0.7–3.1)
LYMPHOCYTES NFR BLD AUTO: 18.9 % (ref 19.6–45.3)
MCH RBC QN AUTO: 31.3 PG (ref 26.6–33)
MCHC RBC AUTO-ENTMCNC: 32.8 G/DL (ref 31.5–35.7)
MCV RBC AUTO: 95.4 FL (ref 79–97)
METHADONE UR QL SCN: NEGATIVE
MONOCYTES # BLD AUTO: 0.62 10*3/MM3 (ref 0.1–0.9)
MONOCYTES NFR BLD AUTO: 5.4 % (ref 5–12)
NEUTROPHILS NFR BLD AUTO: 73.3 % (ref 42.7–76)
NEUTROPHILS NFR BLD AUTO: 8.48 10*3/MM3 (ref 1.7–7)
NRBC BLD AUTO-RTO: 0 /100 WBC (ref 0–0.2)
OPIATES UR QL: NEGATIVE
OXYCODONE UR QL SCN: NEGATIVE
PCP UR QL SCN: NEGATIVE
PLATELET # BLD AUTO: 307 10*3/MM3 (ref 140–450)
PMV BLD AUTO: 10.1 FL (ref 6–12)
POTASSIUM SERPL-SCNC: 4.7 MMOL/L (ref 3.5–5.2)
PROPOXYPH UR QL: NEGATIVE
PROT SERPL-MCNC: 6.8 G/DL (ref 6–8.5)
RBC # BLD AUTO: 4.6 10*6/MM3 (ref 3.77–5.28)
SODIUM SERPL-SCNC: 134 MMOL/L (ref 136–145)
TRICYCLICS UR QL SCN: NEGATIVE
WBC # BLD AUTO: 11.56 10*3/MM3 (ref 3.4–10.8)

## 2021-07-07 PROCEDURE — 36415 COLL VENOUS BLD VENIPUNCTURE: CPT | Performed by: NURSE PRACTITIONER

## 2021-07-07 PROCEDURE — 85025 COMPLETE CBC W/AUTO DIFF WBC: CPT | Performed by: NURSE PRACTITIONER

## 2021-07-07 PROCEDURE — 99213 OFFICE O/P EST LOW 20 MIN: CPT | Performed by: NURSE PRACTITIONER

## 2021-07-07 PROCEDURE — 80053 COMPREHEN METABOLIC PANEL: CPT | Performed by: NURSE PRACTITIONER

## 2021-07-07 PROCEDURE — 80306 DRUG TEST PRSMV INSTRMNT: CPT | Performed by: NURSE PRACTITIONER

## 2021-07-07 RX ORDER — ESCITALOPRAM OXALATE 20 MG/1
20 TABLET ORAL DAILY
Qty: 90 TABLET | Refills: 3 | Status: SHIPPED | OUTPATIENT
Start: 2021-07-07 | End: 2021-07-27 | Stop reason: ALTCHOICE

## 2021-07-07 RX ORDER — ESCITALOPRAM OXALATE 10 MG/1
10 TABLET ORAL DAILY
COMMUNITY
End: 2021-07-07

## 2021-07-07 RX ORDER — ALPRAZOLAM 0.25 MG/1
0.25 TABLET ORAL 2 TIMES DAILY PRN
Qty: 60 TABLET | Refills: 0 | Status: SHIPPED | OUTPATIENT
Start: 2021-07-07 | End: 2023-03-29

## 2021-07-07 NOTE — PROGRESS NOTES
Chief Complaint   Patient presents with   • Anxiety     6 month check up      Subjective   Marychuy Aburto is a 25 y.o. female.     Presents with recheck of anxiety -not sleeping     Anxiety  Presents for follow-up visit. Symptoms include decreased concentration, excessive worry, nervous/anxious behavior and panic. Patient reports no chest pain, compulsions, confusion, depressed mood, dizziness, dry mouth, feeling of choking, hyperventilation, impotence, insomnia, irritability, malaise, muscle tension, nausea, obsessions, palpitations, restlessness, shortness of breath or suicidal ideas. Symptoms occur most days. The quality of sleep is good. Nighttime awakenings: none.     Her past medical history is significant for depression. Compliance with medications is %.   Depression  Visit Type: follow-up  Patient presents with the following symptoms: decreased concentration, excessive worry, nervousness/anxiety and panic.  Patient is not experiencing: choking sensation, compulsions, confusion, depressed mood, dry mouth, hyperventilation, impotence, insomnia, irritability, malaise, muscle tension, nausea, obsessions, palpitations, restlessness, shortness of breath and suicidal ideas.  Frequency of symptoms: most days   Severity: severe   Nighttime awakenings: none  Compliance with medications:  %  Side effects:  Weight gain  Fatigue  This is a recurrent problem. The current episode started more than 1 month ago. The problem occurs intermittently. The problem has been gradually improving. Associated symptoms include fatigue. Pertinent negatives include no abdominal pain, anorexia, arthralgias, chest pain, chills, congestion, coughing, diaphoresis, fever, headaches, joint swelling, myalgias, nausea, neck pain, numbness or weakness. The symptoms are aggravated by stress. She has tried relaxation for the symptoms. The treatment provided mild relief.        The following portions of the patient's history were  "reviewed and updated as appropriate: allergies, current medications, past social history and problem list.    Review of Systems   Constitutional: Positive for fatigue. Negative for activity change, appetite change, chills, diaphoresis, fever and irritability.   HENT: Negative for congestion.    Eyes: Negative.  Negative for photophobia and visual disturbance.   Respiratory: Negative.  Negative for cough, choking and shortness of breath.    Cardiovascular: Negative.  Negative for chest pain and palpitations.   Gastrointestinal: Negative for abdominal pain, anorexia, blood in stool, constipation, diarrhea and nausea.   Endocrine: Negative.    Genitourinary: Negative.  Negative for impotence.   Musculoskeletal: Negative for arthralgias, back pain, gait problem, joint swelling, myalgias and neck pain.   Skin: Negative.    Allergic/Immunologic: Negative.    Neurological: Negative for dizziness, weakness, numbness and headaches.   Hematological: Negative.    Psychiatric/Behavioral: Positive for decreased concentration and sleep disturbance. Negative for confusion and suicidal ideas. The patient is nervous/anxious. The patient does not have insomnia.        Objective   /62   Temp 97.3 °F (36.3 °C) (Infrared)   Ht 162.6 cm (64\")   Wt 73.9 kg (163 lb)   BMI 27.98 kg/m²   Physical Exam  Vitals and nursing note reviewed.   Constitutional:       General: She is not in acute distress.     Appearance: Normal appearance. She is normal weight. She is not ill-appearing, toxic-appearing or diaphoretic.   HENT:      Head: Normocephalic and atraumatic.      Right Ear: Tympanic membrane normal.      Left Ear: Tympanic membrane normal.      Nose: Nose normal.      Mouth/Throat:      Mouth: Mucous membranes are dry.   Eyes:      Extraocular Movements: Extraocular movements intact.      Pupils: Pupils are equal, round, and reactive to light.   Cardiovascular:      Rate and Rhythm: Normal rate and regular rhythm.      Pulses: " Normal pulses.      Heart sounds: No murmur heard.   No friction rub. No gallop.    Pulmonary:      Effort: Pulmonary effort is normal.      Breath sounds: Normal breath sounds.   Abdominal:      General: Abdomen is flat. There is no distension.      Palpations: Abdomen is soft. There is no mass.      Tenderness: There is no abdominal tenderness. There is no guarding or rebound.      Hernia: No hernia is present.   Musculoskeletal:         General: No swelling, tenderness, deformity or signs of injury. Normal range of motion.      Cervical back: Normal range of motion and neck supple.      Right lower leg: No edema.      Left lower leg: No edema.   Skin:     General: Skin is warm.      Coloration: Skin is not jaundiced or pale.      Findings: No bruising, erythema, lesion or rash.   Neurological:      General: No focal deficit present.      Mental Status: She is alert and oriented to person, place, and time.      Cranial Nerves: No cranial nerve deficit.      Sensory: No sensory deficit.      Motor: No weakness.      Coordination: Coordination normal.      Gait: Gait normal.      Deep Tendon Reflexes: Reflexes normal.   Psychiatric:         Mood and Affect: Mood normal.         Behavior: Behavior normal.         Assessment/Plan   Problems Addressed this Visit        Mental Health    Anxiety - Primary    Relevant Medications    ALPRAZolam (Xanax) 0.25 MG tablet    Other Relevant Orders    Urine Drug Screen - Urine, Clean Catch (Completed)    CBC & Differential (Completed)    Comprehensive Metabolic Panel (Completed)    Depression    Relevant Medications    escitalopram (Lexapro) 20 MG tablet    ALPRAZolam (Xanax) 0.25 MG tablet    Other Relevant Orders    Urine Drug Screen - Urine, Clean Catch (Completed)    CBC & Differential (Completed)    Comprehensive Metabolic Panel (Completed)       Symptoms and Signs    Malaise and fatigue    Relevant Medications    ALPRAZolam (Xanax) 0.25 MG tablet    Other Relevant Orders     Urine Drug Screen - Urine, Clean Catch (Completed)    CBC & Differential (Completed)    Comprehensive Metabolic Panel (Completed)      Diagnoses       Codes Comments    Anxiety    -  Primary ICD-10-CM: F41.9  ICD-9-CM: 300.00     Depression, unspecified depression type     ICD-10-CM: F32.9  ICD-9-CM: 311     Malaise and fatigue     ICD-10-CM: R53.81, R53.83  ICD-9-CM: 780.79            New Medications Ordered This Visit   Medications   • escitalopram (Lexapro) 20 MG tablet     Sig: Take 1 tablet by mouth Daily.     Dispense:  90 tablet     Refill:  3   • ALPRAZolam (Xanax) 0.25 MG tablet     Sig: Take 1 tablet by mouth 2 (Two) Times a Day As Needed for Anxiety.     Dispense:  60 tablet     Refill:  0       Stress relief discussed in length. Consider therapy, suggest yoga, exercise, meditation -patient is agrees-will call back if worsen for referral     Increase lexapro to 20mg, use xanax prn-has used in the past-a prescription will last 1 year-see back in 3 months as directed, patient agrees -labs and uds ordered today -increase water as directed    Patient understands the risks associated with this controlled medication, including tolerance and addiction.  she also agrees to only obtain this medication from me, and not from a another provider, unless that provider is covering for me in my absence.  she also agrees to be compliant in dosing, and not self adjust the dose of medication.  A signed controlled substance agreement is on file, and she has received a controlled substance education sheet at this a previous visit.  she has also signed a consent for treatment with a controlled substance as per [facility] policy. KARLIE was obtained.

## 2021-07-26 ENCOUNTER — TELEPHONE (OUTPATIENT)
Dept: FAMILY MEDICINE CLINIC | Facility: CLINIC | Age: 26
End: 2021-07-26

## 2021-07-26 NOTE — TELEPHONE ENCOUNTER
Marychuy was needing a call back regarding the medication changes Portia gave her at last appt. She is having some side effects that she would like to discuss.     Contact [phone]

## 2021-07-27 RX ORDER — FLUCONAZOLE 150 MG/1
150 TABLET ORAL DAILY
Qty: 3 TABLET | Refills: 0 | Status: SHIPPED | OUTPATIENT
Start: 2021-07-27 | End: 2021-07-28 | Stop reason: SDUPTHER

## 2021-07-27 RX ORDER — FLUVOXAMINE MALEATE 50 MG/1
50 TABLET, COATED ORAL NIGHTLY
Qty: 30 TABLET | Refills: 3 | Status: SHIPPED | OUTPATIENT
Start: 2021-07-27 | End: 2021-07-28 | Stop reason: SDUPTHER

## 2021-07-27 NOTE — TELEPHONE ENCOUNTER
Per Caroline Dyer, Raymond scripts have been sent to [facility] for Diflucan 150 mg #3 Take 1 tablet by mouth daily for 3 days. With no refills  Luvox 50 mg #30 Take 1 tablet by mouth every night. With 3 refills    Patient was advised to stop the Lexapro.   Yes, she would like to see the Mental Health APRN. Please send the referral.       Per Caroline Dyer, APRN:  Can you send in diflucan 150mg x3 days, Luvox 50 mg and see if she wants a referral to see a mental health NP. We can refer her for eval. That may help

## 2021-07-27 NOTE — TELEPHONE ENCOUNTER
I have talked to Ms. Aburto.    She states that she has been extremely tired, especially since starting the new scripts.  She states even with her pre work out drink she is still barely making it through her workout she is so tired.     She said she feels the medication may be to strong or that she needs to be changed to something else.    She states she has a friend that is an APRN in [place] and she had been telling her about the problems she has been having.     The friend said that it sounds like she may need something to treat more of an anxiety/OCD problem than an anxiety/depression problem.     She wanted to know if you could change her medications to something else.      Also she states she is having a skin reaction possibly to a new detergent she has been using and states she feels she has another yeast infection because of this.     She is asking if you could call her in some more diflucan to treat that.   She states she had some left over from a previous script and she has take 1 tablet the last two days but is out and things have not cleared up.  She said it was a little red pill that she took for 3 days and then if no better she could repeat the dosage.  I assume it was diflucan she is requesting.     She knows you are out of office until Wednesday and I apologized that she was not contacted yesterday.  They sent the message to the covering MA and she did not send it to you or call the patient.     NEYDA Hunter

## 2021-07-28 DIAGNOSIS — F41.9 ANXIETY: Primary | ICD-10-CM

## 2021-07-28 PROCEDURE — 87635 SARS-COV-2 COVID-19 AMP PRB: CPT | Performed by: NURSE PRACTITIONER

## 2021-07-28 RX ORDER — FLUVOXAMINE MALEATE 50 MG/1
50 TABLET, COATED ORAL NIGHTLY
Qty: 30 TABLET | Refills: 3 | OUTPATIENT
Start: 2021-07-28 | End: 2021-12-17

## 2021-07-28 RX ORDER — FLUCONAZOLE 150 MG/1
150 TABLET ORAL DAILY
Qty: 3 TABLET | Refills: 0 | OUTPATIENT
Start: 2021-07-28 | End: 2021-12-17

## 2021-07-28 NOTE — TELEPHONE ENCOUNTER
Can you call her and make sure she is taken care of-eric send in luvox and diflucan and referred her to mental health

## 2021-07-29 ENCOUNTER — TELEPHONE (OUTPATIENT)
Dept: FAMILY MEDICINE CLINIC | Facility: CLINIC | Age: 26
End: 2021-07-29

## 2022-09-14 ENCOUNTER — TELEPHONE (OUTPATIENT)
Dept: FAMILY MEDICINE CLINIC | Facility: CLINIC | Age: 27
End: 2022-09-14

## 2022-09-14 RX ORDER — FLUCONAZOLE 150 MG/1
TABLET ORAL
Qty: 3 TABLET | Refills: 0 | OUTPATIENT
Start: 2022-09-14 | End: 2022-11-14

## 2022-09-14 NOTE — TELEPHONE ENCOUNTER
Patient called stating she took a bath using Vicks bath salt and it has given her a yeast infection. Patient is wanting to know if Caroline could call her in something to [facility]. Please advise [phone]

## 2022-12-30 ENCOUNTER — TELEPHONE (OUTPATIENT)
Dept: FAMILY MEDICINE CLINIC | Facility: CLINIC | Age: 27
End: 2022-12-30

## 2022-12-30 RX ORDER — SULFAMETHOXAZOLE AND TRIMETHOPRIM 800; 160 MG/1; MG/1
1 TABLET ORAL 2 TIMES DAILY
Qty: 20 TABLET | Refills: 0 | Status: SHIPPED | OUTPATIENT
Start: 2022-12-30 | End: 2023-03-29

## 2022-12-30 RX ORDER — SULFAMETHOXAZOLE AND TRIMETHOPRIM 800; 160 MG/1; MG/1
1 TABLET ORAL 2 TIMES DAILY
Qty: 20 TABLET | Refills: 0 | Status: SHIPPED | OUTPATIENT
Start: 2022-12-30 | End: 2022-12-30 | Stop reason: SDUPTHER

## 2022-12-30 NOTE — TELEPHONE ENCOUNTER
Marychuy called and said she has a bump, raised spot behind her ear at hair line, hard, painful, under the skin and she is having headaches with it.  Said she just woke up with it.  She is willing to come in to see you, said she is probably due blood work anyway.  Asked for a call back to let her know, and she uses [facility] pharmacy

## 2023-03-29 ENCOUNTER — OFFICE VISIT (OUTPATIENT)
Dept: FAMILY MEDICINE CLINIC | Facility: CLINIC | Age: 28
End: 2023-03-29
Payer: COMMERCIAL

## 2023-03-29 ENCOUNTER — LAB (OUTPATIENT)
Dept: LAB | Facility: HOSPITAL | Age: 28
End: 2023-03-29
Payer: COMMERCIAL

## 2023-03-29 VITALS
BODY MASS INDEX: 25.16 KG/M2 | HEART RATE: 74 BPM | HEIGHT: 65 IN | SYSTOLIC BLOOD PRESSURE: 120 MMHG | OXYGEN SATURATION: 99 % | DIASTOLIC BLOOD PRESSURE: 80 MMHG | WEIGHT: 151 LBS

## 2023-03-29 DIAGNOSIS — R53.83 MALAISE AND FATIGUE: Primary | ICD-10-CM

## 2023-03-29 DIAGNOSIS — R53.81 MALAISE AND FATIGUE: Primary | ICD-10-CM

## 2023-03-29 DIAGNOSIS — R13.10 DYSPHAGIA, UNSPECIFIED TYPE: ICD-10-CM

## 2023-03-29 DIAGNOSIS — F41.9 ANXIETY: ICD-10-CM

## 2023-03-29 LAB
25(OH)D3 SERPL-MCNC: 38.2 NG/ML (ref 30–100)
ALBUMIN SERPL-MCNC: 4.2 G/DL (ref 3.5–5.2)
ALBUMIN/GLOB SERPL: 1.6 G/DL
ALP SERPL-CCNC: 66 U/L (ref 39–117)
ALT SERPL W P-5'-P-CCNC: 11 U/L (ref 1–33)
AMPHET+METHAMPHET UR QL: NEGATIVE
AMPHETAMINES UR QL: NEGATIVE
ANION GAP SERPL CALCULATED.3IONS-SCNC: 8.6 MMOL/L (ref 5–15)
AST SERPL-CCNC: 19 U/L (ref 1–32)
BARBITURATES UR QL SCN: NEGATIVE
BASOPHILS # BLD AUTO: 0.06 10*3/MM3 (ref 0–0.2)
BASOPHILS NFR BLD AUTO: 0.6 % (ref 0–1.5)
BENZODIAZ UR QL SCN: NEGATIVE
BILIRUB SERPL-MCNC: 0.4 MG/DL (ref 0–1.2)
BUN SERPL-MCNC: 13 MG/DL (ref 6–20)
BUN/CREAT SERPL: 14.8 (ref 7–25)
BUPRENORPHINE SERPL-MCNC: NEGATIVE NG/ML
CALCIUM SPEC-SCNC: 10.1 MG/DL (ref 8.6–10.5)
CANNABINOIDS SERPL QL: NEGATIVE
CHLORIDE SERPL-SCNC: 105 MMOL/L (ref 98–107)
CHOLEST SERPL-MCNC: 200 MG/DL (ref 0–200)
CO2 SERPL-SCNC: 26.4 MMOL/L (ref 22–29)
COCAINE UR QL: NEGATIVE
CREAT SERPL-MCNC: 0.88 MG/DL (ref 0.57–1)
DEPRECATED RDW RBC AUTO: 43 FL (ref 37–54)
EGFRCR SERPLBLD CKD-EPI 2021: 91.4 ML/MIN/1.73
EOSINOPHIL # BLD AUTO: 0.24 10*3/MM3 (ref 0–0.4)
EOSINOPHIL NFR BLD AUTO: 2.5 % (ref 0.3–6.2)
ERYTHROCYTE [DISTWIDTH] IN BLOOD BY AUTOMATED COUNT: 12 % (ref 12.3–15.4)
GLOBULIN UR ELPH-MCNC: 2.7 GM/DL
GLUCOSE SERPL-MCNC: 87 MG/DL (ref 65–99)
HCT VFR BLD AUTO: 40 % (ref 34–46.6)
HDLC SERPL-MCNC: 67 MG/DL (ref 40–60)
HGB BLD-MCNC: 13.4 G/DL (ref 12–15.9)
IMM GRANULOCYTES # BLD AUTO: 0.04 10*3/MM3 (ref 0–0.05)
IMM GRANULOCYTES NFR BLD AUTO: 0.4 % (ref 0–0.5)
LDLC SERPL CALC-MCNC: 123 MG/DL (ref 0–100)
LDLC/HDLC SERPL: 1.82 {RATIO}
LYMPHOCYTES # BLD AUTO: 1.99 10*3/MM3 (ref 0.7–3.1)
LYMPHOCYTES NFR BLD AUTO: 20.8 % (ref 19.6–45.3)
MCH RBC QN AUTO: 32.8 PG (ref 26.6–33)
MCHC RBC AUTO-ENTMCNC: 33.5 G/DL (ref 31.5–35.7)
MCV RBC AUTO: 97.8 FL (ref 79–97)
METHADONE UR QL SCN: NEGATIVE
MONOCYTES # BLD AUTO: 0.65 10*3/MM3 (ref 0.1–0.9)
MONOCYTES NFR BLD AUTO: 6.8 % (ref 5–12)
NEUTROPHILS NFR BLD AUTO: 6.6 10*3/MM3 (ref 1.7–7)
NEUTROPHILS NFR BLD AUTO: 68.9 % (ref 42.7–76)
NRBC BLD AUTO-RTO: 0 /100 WBC (ref 0–0.2)
OPIATES UR QL: NEGATIVE
OXYCODONE UR QL SCN: NEGATIVE
PCP UR QL SCN: NEGATIVE
PLATELET # BLD AUTO: 323 10*3/MM3 (ref 140–450)
PMV BLD AUTO: 10.8 FL (ref 6–12)
POTASSIUM SERPL-SCNC: 5.1 MMOL/L (ref 3.5–5.2)
PROPOXYPH UR QL: NEGATIVE
PROT SERPL-MCNC: 6.9 G/DL (ref 6–8.5)
RBC # BLD AUTO: 4.09 10*6/MM3 (ref 3.77–5.28)
SODIUM SERPL-SCNC: 140 MMOL/L (ref 136–145)
TRICYCLICS UR QL SCN: NEGATIVE
TRIGL SERPL-MCNC: 55 MG/DL (ref 0–150)
TSH SERPL DL<=0.05 MIU/L-ACNC: 2.39 UIU/ML (ref 0.27–4.2)
VIT B12 BLD-MCNC: 424 PG/ML (ref 211–946)
VLDLC SERPL-MCNC: 10 MG/DL (ref 5–40)
WBC NRBC COR # BLD: 9.58 10*3/MM3 (ref 3.4–10.8)

## 2023-03-29 PROCEDURE — 80306 DRUG TEST PRSMV INSTRMNT: CPT | Performed by: NURSE PRACTITIONER

## 2023-03-29 PROCEDURE — 82306 VITAMIN D 25 HYDROXY: CPT | Performed by: NURSE PRACTITIONER

## 2023-03-29 PROCEDURE — 36415 COLL VENOUS BLD VENIPUNCTURE: CPT | Performed by: NURSE PRACTITIONER

## 2023-03-29 PROCEDURE — 99214 OFFICE O/P EST MOD 30 MIN: CPT | Performed by: NURSE PRACTITIONER

## 2023-03-29 PROCEDURE — 80050 GENERAL HEALTH PANEL: CPT | Performed by: NURSE PRACTITIONER

## 2023-03-29 PROCEDURE — 1160F RVW MEDS BY RX/DR IN RCRD: CPT | Performed by: NURSE PRACTITIONER

## 2023-03-29 PROCEDURE — 82607 VITAMIN B-12: CPT | Performed by: NURSE PRACTITIONER

## 2023-03-29 PROCEDURE — 1159F MED LIST DOCD IN RCRD: CPT | Performed by: NURSE PRACTITIONER

## 2023-03-29 PROCEDURE — 80061 LIPID PANEL: CPT | Performed by: NURSE PRACTITIONER

## 2023-03-29 RX ORDER — CLONAZEPAM 0.5 MG/1
0.5 TABLET ORAL 2 TIMES DAILY PRN
Qty: 40 TABLET | Refills: 0 | Status: SHIPPED | OUTPATIENT
Start: 2023-03-29

## 2023-03-29 RX ORDER — PANTOPRAZOLE SODIUM 40 MG/1
40 TABLET, DELAYED RELEASE ORAL DAILY
Qty: 90 TABLET | Refills: 3 | Status: SHIPPED | OUTPATIENT
Start: 2023-03-29

## 2023-03-29 NOTE — PROGRESS NOTES
Chief Complaint   Patient presents with   • Fatigue   • Stress     Subjective   Marychuy Aburto is a 27 y.o. female.           Fatigue  This is a recurrent problem. The current episode started more than 1 month ago. The problem occurs intermittently. The problem has been gradually worsening. Associated symptoms include fatigue. Pertinent negatives include no abdominal pain, arthralgias, chest pain, chills, congestion, coughing, diaphoresis, fever, headaches, joint swelling, myalgias, nausea, neck pain, numbness or weakness. She has tried rest for the symptoms. The treatment provided mild relief.   Anxiety  Presents for follow-up visit. Symptoms include decreased concentration and nervous/anxious behavior. Patient reports no chest pain, confusion, dizziness, nausea, palpitations, shortness of breath or suicidal ideas. Symptoms occur most days. The severity of symptoms is moderate. The quality of sleep is good.       Abdominal Pain  This is a new problem. The current episode started more than 1 month ago. The problem has been gradually worsening. The pain is located in the epigastric region. The pain is at a severity of 2/10. The quality of the pain is dull. The abdominal pain radiates to the epigastric region. Pertinent negatives include no arthralgias, constipation, diarrhea, fever, headaches, myalgias or nausea. The treatment provided mild relief. There is no history of abdominal surgery, colon cancer, irritable bowel syndrome or pancreatitis.        The following portions of the patient's history were reviewed and updated as appropriate: allergies, current medications, past social history and problem list.    Review of Systems   Constitutional: Positive for fatigue and unexpected weight change. Negative for activity change, appetite change, chills, diaphoresis and fever.   HENT: Negative for congestion.    Eyes: Negative.  Negative for photophobia and visual disturbance.   Respiratory: Negative.  Negative for  "cough, choking and shortness of breath.    Cardiovascular: Negative.  Negative for chest pain and palpitations.   Gastrointestinal: Negative for abdominal pain, blood in stool, constipation, diarrhea and nausea.        Trouble swallowing    Endocrine: Negative.    Genitourinary: Negative.    Musculoskeletal: Negative for arthralgias, back pain, gait problem, joint swelling, myalgias and neck pain.   Skin: Negative.    Allergic/Immunologic: Negative.    Neurological: Negative for dizziness, weakness, numbness and headaches.   Hematological: Negative.  Negative for adenopathy. Does not bruise/bleed easily.   Psychiatric/Behavioral: Positive for decreased concentration and sleep disturbance. Negative for agitation, confusion, dysphoric mood, hallucinations and suicidal ideas. The patient is nervous/anxious.        Objective   /80   Pulse 74   Ht 165.1 cm (65\")   Wt 68.5 kg (151 lb)   SpO2 99%   BMI 25.13 kg/m²   Physical Exam  Vitals and nursing note reviewed.   Constitutional:       General: She is not in acute distress.     Appearance: Normal appearance. She is normal weight. She is not ill-appearing, toxic-appearing or diaphoretic.   HENT:      Head: Normocephalic and atraumatic.      Right Ear: Tympanic membrane normal. There is no impacted cerumen.      Left Ear: Tympanic membrane normal. There is no impacted cerumen.      Nose: Nose normal.      Mouth/Throat:      Mouth: Mucous membranes are dry.      Pharynx: No oropharyngeal exudate or posterior oropharyngeal erythema.   Eyes:      Extraocular Movements: Extraocular movements intact.      Pupils: Pupils are equal, round, and reactive to light.   Cardiovascular:      Rate and Rhythm: Normal rate and regular rhythm.      Pulses: Normal pulses.      Heart sounds: No murmur heard.    No friction rub. No gallop.   Pulmonary:      Effort: Pulmonary effort is normal. No respiratory distress.      Breath sounds: Normal breath sounds. No stridor. No wheezing, " rhonchi or rales.   Abdominal:      General: Abdomen is flat. There is no distension.      Palpations: Abdomen is soft. There is no mass.      Tenderness: There is abdominal tenderness. There is no right CVA tenderness, left CVA tenderness, guarding or rebound.      Hernia: No hernia is present.      Comments: Epigastric pain -feels like food is getting stuck in throat    Musculoskeletal:         General: No swelling, tenderness, deformity or signs of injury. Normal range of motion.      Cervical back: Normal range of motion and neck supple.      Right lower leg: No edema.      Left lower leg: No edema.   Skin:     General: Skin is warm.      Coloration: Skin is not jaundiced or pale.      Findings: No bruising, erythema, lesion or rash.   Neurological:      General: No focal deficit present.      Mental Status: She is alert and oriented to person, place, and time.      Cranial Nerves: No cranial nerve deficit.      Sensory: No sensory deficit.      Motor: No weakness.      Coordination: Coordination normal.      Gait: Gait normal.      Deep Tendon Reflexes: Reflexes normal.   Psychiatric:         Mood and Affect: Mood normal.         Behavior: Behavior normal.              Assessment & Plan     Problems Addressed this Visit        Mental Health    Anxiety    Relevant Medications    clonazePAM (KlonoPIN) 0.5 MG tablet    Other Relevant Orders    CBC & Differential    Comprehensive Metabolic Panel    Lipid Panel    Vitamin D,25-Hydroxy    TSH    Vitamin B12    Urine Drug Screen - Urine, Clean Catch       Symptoms and Signs    Malaise and fatigue - Primary    Relevant Medications    clonazePAM (KlonoPIN) 0.5 MG tablet    Other Relevant Orders    CBC & Differential    Comprehensive Metabolic Panel    Lipid Panel    Vitamin D,25-Hydroxy    TSH    Vitamin B12    Urine Drug Screen - Urine, Clean Catch   Other Visit Diagnoses     Dysphagia, unspecified type        Relevant Orders    Ambulatory Referral to Gastroenterology       Diagnoses       Codes Comments    Malaise and fatigue    -  Primary ICD-10-CM: R53.81, R53.83  ICD-9-CM: 780.79     Anxiety     ICD-10-CM: F41.9  ICD-9-CM: 300.00     Dysphagia, unspecified type     ICD-10-CM: R13.10  ICD-9-CM: 787.20            New Medications Ordered This Visit   Medications   • clonazePAM (KlonoPIN) 0.5 MG tablet     Sig: Take 1 tablet by mouth 2 (Two) Times a Day As Needed for Anxiety.     Dispense:  40 tablet     Refill:  0   • pantoprazole (Protonix) 40 MG EC tablet     Sig: Take 1 tablet by mouth Daily.     Dispense:  90 tablet     Refill:  3     Current Outpatient Medications on File Prior to Visit   Medication Sig Dispense Refill   • [DISCONTINUED] ALPRAZolam (Xanax) 0.25 MG tablet Take 1 tablet by mouth 2 (Two) Times a Day As Needed for Anxiety. 60 tablet 0   • [DISCONTINUED] brompheniramine-pseudoephedrine-DM 30-2-10 MG/5ML syrup Take 5 mL by mouth 4 (Four) Times a Day As Needed for Congestion, Cough or Allergies. 150 mL 0   • [DISCONTINUED] fluvoxaMINE (LUVOX) 50 MG tablet Take 1 tablet by mouth Every Night. 30 tablet 3   • [DISCONTINUED] levocetirizine (Xyzal) 5 MG tablet Take 1 tablet by mouth Every Evening. 30 tablet 6   • [DISCONTINUED] mupirocin (BACTROBAN) 2 % ointment Apply 1 application topically to the appropriate area as directed 3 (Three) Times a Day. 30 g 1   • [DISCONTINUED] sulfamethoxazole-trimethoprim (Bactrim DS) 800-160 MG per tablet Take 1 tablet by mouth 2 (Two) Times a Day. 20 tablet 0     Current Facility-Administered Medications on File Prior to Visit   Medication Dose Route Frequency Provider Last Rate Last Admin   • levonorgestrel (MIRENA) 20 MCG/24HR IUD   Intrauterine Continuous Valeria Carlisle APRN   New Bag at 03/12/19 0850       20 minutes  Follow Up   No follow-ups on file.        Labs today, meds as directed, follow up if worsen     uds and deena reviewed      Patient understands the risks associated with this controlled medication, including  tolerance and addiction.  she also agrees to only obtain this medication from me, and not from a another provider, unless that provider is covering for me in my absence.  she also agrees to be compliant in dosing, and not self adjust the dose of medication.  A signed controlled substance agreement is on file, and she has received a controlled substance education sheet at this a previous visit.  she has also signed a consent for treatment with a controlled substance as per [facility] policy. KARLIE was obtained.

## 2023-03-30 ENCOUNTER — TELEPHONE (OUTPATIENT)
Dept: FAMILY MEDICINE CLINIC | Facility: CLINIC | Age: 28
End: 2023-03-30
Payer: COMMERCIAL

## 2023-03-30 NOTE — PROGRESS NOTES
Per DEYA Velazquez, Ms. Aburto has been called with recent lab results & recommendations.  Continue current medications and follow-up as planned or sooner if any problems.

## 2023-03-30 NOTE — TELEPHONE ENCOUNTER
Per DEYA Velazquez, Ms. Aburto has been called with recent lab results & recommendations.  Continue current medications and follow-up as planned or sooner if any problems.       ----- Message from DEYA Larry sent at 3/30/2023  7:36 AM CDT -----  Can you let her know overall her labs look good, no changes needed

## 2023-04-17 ENCOUNTER — OFFICE VISIT (OUTPATIENT)
Dept: GASTROENTEROLOGY | Facility: CLINIC | Age: 28
End: 2023-04-17
Payer: COMMERCIAL

## 2023-04-17 VITALS
DIASTOLIC BLOOD PRESSURE: 72 MMHG | HEIGHT: 65 IN | HEART RATE: 76 BPM | SYSTOLIC BLOOD PRESSURE: 110 MMHG | BODY MASS INDEX: 24.96 KG/M2 | WEIGHT: 149.8 LBS

## 2023-04-17 DIAGNOSIS — R13.19 ESOPHAGEAL DYSPHAGIA: Primary | ICD-10-CM

## 2023-04-17 DIAGNOSIS — D18.00 CAVERNOUS HEMANGIOMA: ICD-10-CM

## 2023-04-17 PROCEDURE — 1160F RVW MEDS BY RX/DR IN RCRD: CPT | Performed by: NURSE PRACTITIONER

## 2023-04-17 PROCEDURE — 99213 OFFICE O/P EST LOW 20 MIN: CPT | Performed by: NURSE PRACTITIONER

## 2023-04-17 PROCEDURE — 1159F MED LIST DOCD IN RCRD: CPT | Performed by: NURSE PRACTITIONER

## 2023-04-17 RX ORDER — SODIUM CHLORIDE 9 MG/ML
40 INJECTION, SOLUTION INTRAVENOUS AS NEEDED
OUTPATIENT
Start: 2023-04-17

## 2023-04-17 RX ORDER — DEXTROSE AND SODIUM CHLORIDE 5; .45 G/100ML; G/100ML
30 INJECTION, SOLUTION INTRAVENOUS CONTINUOUS PRN
OUTPATIENT
Start: 2023-04-17

## 2023-04-17 NOTE — PROGRESS NOTES
Chief Complaint   Patient presents with   • Difficulty Swallowing       Subjective    Marychuy Aburto is a 27 y.o. female. she is here today                                                                   Assessment & Plan                                     1. Esophageal dysphagia    2. Cavernous hemangioma mediastinum      Plan; schedule patient for EGD with dilation if indicated due to dysphagia also obtain biopsy to further evaluate.  We will schedule CT chest due to history of cavernous hemangioma of mediastinum and follow-up with Dr. Hayes if there are significant change in this.    Follow-up: Return in about 4 weeks (around 5/15/2023) for Recheck, After test.     HPI  27-year-old female presents to discuss dysphagia reports it started several months ago and has progressively worsened.  She has concurrent medical history of anxiety and depression.  She has history anterior hemangioma of mediastinum followed by Dr. Cortes until about 2018 demonstrated stability so she has not followed up with him over the last 6 years.  Reports he told her dysphagia may be an issue if it changed in size significantly.  Reports she has had some epigastric pain dysphagia started at with meats but occurs intermittently with most all solid foods.  Her weight has been stable states bowel movements have always been constipated and she has modified her diet to help regulate reports occasional hemorrhoidal pain but denies any melena or blood in the stool.    Review of Systems  Review of Systems   Constitutional: Negative for activity change, appetite change, chills, diaphoresis, fatigue, fever and unexpected weight change.   HENT: Positive for trouble swallowing (globus sensation started several months ago with meats ). Negative for sore throat.    Respiratory: Negative for shortness of breath.   "  Gastrointestinal: Positive for abdominal pain and constipation (states has had issues since childhood ). Negative for abdominal distention, anal bleeding, blood in stool, diarrhea, nausea, rectal pain and vomiting.        Drinks miralax, otc laxatives to have a bm 1-2 times per week    Musculoskeletal: Negative for arthralgias.   Skin: Negative for pallor.   Neurological: Negative for light-headedness.       /72 (BP Location: Left arm)   Pulse 76   Ht 165.1 cm (65\")   Wt 67.9 kg (149 lb 12.8 oz)   BMI 24.93 kg/m²     Objective      Physical Exam  Constitutional:       General: She is not in acute distress.     Appearance: Normal appearance. She is normal weight. She is not ill-appearing.   HENT:      Head: Normocephalic and atraumatic.   Pulmonary:      Effort: Pulmonary effort is normal.   Abdominal:      General: Abdomen is flat. Bowel sounds are normal. There is no distension.      Palpations: Abdomen is soft. There is no mass.      Tenderness: There is no abdominal tenderness.   Neurological:      Mental Status: She is alert.               The following portions of the patient's history were reviewed and updated as appropriate:   Past Medical History:   Diagnosis Date   • Acute sinusitis    • Agoraphobia with panic attacks    • Chronic depression    • Corneal abrasion    • Dysfunction of eustachian tube    • Encounter for general counseling on prescription of oral contraceptives    • Generalized anxiety disorder    • Hemangioma     mediastinal cavernous [id number] stable      • Herpes simplex    • Iron deficiency anemia    • Laryngitis    • Malaise and fatigue    • Moderate Vaginal discharge     reports frequent yeast infections      • Otitis media    • Ovarian cyst    • PONV (postoperative nausea and vomiting)    • Streptococcal pharyngitis    • Syncope    • Tracheitis    • Verruca plantaris      Past Surgical History:   Procedure Laterality Date   • KNEE ARTHROSCOPY  2009    x2   • MEDIASTINOSCOPY  " 2011    Mediastinoscopy. Cavernous hemangioma. Right paratracheal mass.   • WISDOM TOOTH EXTRACTION       Family History   Problem Relation Age of Onset   • Cancer Other    • Diabetes Other    • Heart disease Other    • Hypertension Other    • Hypertension Paternal Grandmother    • Diabetes Paternal Grandmother    • Breast cancer Paternal Grandmother      OB History        2    Para   1    Term   1            AB   1    Living   1       SAB   1    IAB        Ectopic        Molar        Multiple   0    Live Births   1              Allergies   Allergen Reactions   • Anesthetics, Amide Other (See Comments)     Quit breathing.   • Lorabid [Loracarbef] Rash     Social History     Socioeconomic History   • Marital status: Single   Tobacco Use   • Smoking status: Never   • Smokeless tobacco: Never   Vaping Use   • Vaping Use: Never used   Substance and Sexual Activity   • Alcohol use: No   • Drug use: No   • Sexual activity: Yes     Partners: Male     Current Medications:  Prior to Admission medications    Medication Sig Start Date End Date Taking? Authorizing Provider   clonazePAM (KlonoPIN) 0.5 MG tablet Take 1 tablet by mouth 2 (Two) Times a Day As Needed for Anxiety. 3/29/23  Yes Caroline Zarco APRN   pantoprazole (Protonix) 40 MG EC tablet Take 1 tablet by mouth Daily.  Patient not taking: Reported on 2023 3/29/23   Caroline Zarco APRN   fluvoxaMINE (LUVOX) 50 MG tablet Take 1 tablet by mouth Every Night. 21  Caroline Zarco APRN   levocetirizine (Xyzal) 5 MG tablet Take 1 tablet by mouth Every Evening. 21  Delores Smith APRN     Orders placed during this encounter include:  Orders Placed This Encounter   Procedures   • CT Chest With Contrast     Standing Status:   Future     Standing Expiration Date:   2024     Order Specific Question:   Release to patient     Answer:   Routine Release     Order Specific Question:   Patient Pregnant      Answer:   No   • Obtain Informed Consent     Standing Status:   Future     Order Specific Question:   Informed Consent Given For     Answer:   ESOPHAGOGASTRODUODENOSCOPY possible dilation     ESOPHAGOGASTRODUODENOSCOPY possible dilation (N/A)  No orders of the defined types were placed in this encounter.        Review and/or summary of lab tests, radiology, procedures, medications. Review and summary of old records and obtaining of history. The risks and benefits of my recommendations, as well as other treatment options were discussed . Any questions/concerned were answered. Patient voiced understanding and agreement.          This document has been electronically signed by DEYA Leger on April 17, 2023 11:13 CDT                                               Results for orders placed or performed in visit on 03/29/23   CBC Auto Differential    Specimen: Blood   Result Value Ref Range    WBC 9.58 3.40 - 10.80 10*3/mm3    RBC 4.09 3.77 - 5.28 10*6/mm3    Hemoglobin 13.4 12.0 - 15.9 g/dL    Hematocrit 40.0 34.0 - 46.6 %    MCV 97.8 (H) 79.0 - 97.0 fL    MCH 32.8 26.6 - 33.0 pg    MCHC 33.5 31.5 - 35.7 g/dL    RDW 12.0 (L) 12.3 - 15.4 %    RDW-SD 43.0 37.0 - 54.0 fl    MPV 10.8 6.0 - 12.0 fL    Platelets 323 140 - 450 10*3/mm3    Neutrophil % 68.9 42.7 - 76.0 %    Lymphocyte % 20.8 19.6 - 45.3 %    Monocyte % 6.8 5.0 - 12.0 %    Eosinophil % 2.5 0.3 - 6.2 %    Basophil % 0.6 0.0 - 1.5 %    Immature Grans % 0.4 0.0 - 0.5 %    Neutrophils, Absolute 6.60 1.70 - 7.00 10*3/mm3    Lymphocytes, Absolute 1.99 0.70 - 3.10 10*3/mm3    Monocytes, Absolute 0.65 0.10 - 0.90 10*3/mm3    Eosinophils, Absolute 0.24 0.00 - 0.40 10*3/mm3    Basophils, Absolute 0.06 0.00 - 0.20 10*3/mm3    Immature Grans, Absolute 0.04 0.00 - 0.05 10*3/mm3    nRBC 0.0 0.0 - 0.2 /100 WBC   Urine Drug Screen - Urine, Clean Catch    Specimen: Urine, Clean Catch   Result Value Ref Range    THC, Screen, Urine Negative Negative    Phencyclidine (PCP),  Urine Negative Negative    Cocaine Screen, Urine Negative Negative    Methamphetamine, Ur Negative Negative    Opiate Screen Negative Negative    Amphetamine Screen, Urine Negative Negative    Benzodiazepine Screen, Urine Negative Negative    Tricyclic Antidepressants Screen Negative Negative    Methadone Screen, Urine Negative Negative    Barbiturates Screen, Urine Negative Negative    Oxycodone Screen, Urine Negative Negative    Propoxyphene Screen Negative Negative    Buprenorphine, Screen, Urine Negative Negative   Vitamin D,25-Hydroxy    Specimen: Blood   Result Value Ref Range    25 Hydroxy, Vitamin D 38.2 30.0 - 100.0 ng/ml   TSH    Specimen: Blood   Result Value Ref Range    TSH 2.390 0.270 - 4.200 uIU/mL   Vitamin B12    Specimen: Blood   Result Value Ref Range    Vitamin B-12 424 211 - 946 pg/mL   Lipid Panel    Specimen: Blood   Result Value Ref Range    Total Cholesterol 200 0 - 200 mg/dL    Triglycerides 55 0 - 150 mg/dL    HDL Cholesterol 67 (H) 40 - 60 mg/dL    LDL Cholesterol  123 (H) 0 - 100 mg/dL    VLDL Cholesterol 10 5 - 40 mg/dL    LDL/HDL Ratio 1.82    Comprehensive Metabolic Panel    Specimen: Blood   Result Value Ref Range    Glucose 87 65 - 99 mg/dL    BUN 13 6 - 20 mg/dL    Creatinine 0.88 0.57 - 1.00 mg/dL    Sodium 140 136 - 145 mmol/L    Potassium 5.1 3.5 - 5.2 mmol/L    Chloride 105 98 - 107 mmol/L    CO2 26.4 22.0 - 29.0 mmol/L    Calcium 10.1 8.6 - 10.5 mg/dL    Total Protein 6.9 6.0 - 8.5 g/dL    Albumin 4.2 3.5 - 5.2 g/dL    ALT (SGPT) 11 1 - 33 U/L    AST (SGOT) 19 1 - 32 U/L    Alkaline Phosphatase 66 39 - 117 U/L    Total Bilirubin 0.4 0.0 - 1.2 mg/dL    Globulin 2.7 gm/dL    A/G Ratio 1.6 g/dL    BUN/Creatinine Ratio 14.8 7.0 - 25.0    Anion Gap 8.6 5.0 - 15.0 mmol/L    eGFR 91.4 >60.0 mL/min/1.73   Results for orders placed or performed during the hospital encounter of 12/17/21   POCT SARS-CoV-2 Antigen MELANIE   ([facility], [place])    Specimen: Swab   Result Value Ref  Range    SARS Antigen Detected (A) Not Detected    Internal Control Passed Passed    Lot Number [id number]     Expiration Date 08/20/2023    Results for orders placed or performed during the hospital encounter of 07/28/21   COVID-19, [facility] IN-HOUSE, NP SWAB IN TRANSPORT MEDIA 8-10 HR TAT - Swab, Anterior nasal    Specimen: Anterior nasal; Swab   Result Value Ref Range    COVID19 Not Detected Not Detected - Ref. Range   Results for orders placed or performed in visit on 07/07/21   CBC Auto Differential    Specimen: Blood   Result Value Ref Range    WBC 11.56 (H) 3.40 - 10.80 10*3/mm3    RBC 4.60 3.77 - 5.28 10*6/mm3    Hemoglobin 14.4 12.0 - 15.9 g/dL    Hematocrit 43.9 34.0 - 46.6 %    MCV 95.4 79.0 - 97.0 fL    MCH 31.3 26.6 - 33.0 pg    MCHC 32.8 31.5 - 35.7 g/dL    RDW 13.8 12.3 - 15.4 %    RDW-SD 48.2 37.0 - 54.0 fl    MPV 10.1 6.0 - 12.0 fL    Platelets 307 140 - 450 10*3/mm3    Neutrophil % 73.3 42.7 - 76.0 %    Lymphocyte % 18.9 (L) 19.6 - 45.3 %    Monocyte % 5.4 5.0 - 12.0 %    Eosinophil % 1.6 0.3 - 6.2 %    Basophil % 0.3 0.0 - 1.5 %    Immature Grans % 0.5 0.0 - 0.5 %    Neutrophils, Absolute 8.48 (H) 1.70 - 7.00 10*3/mm3    Lymphocytes, Absolute 2.18 0.70 - 3.10 10*3/mm3    Monocytes, Absolute 0.62 0.10 - 0.90 10*3/mm3    Eosinophils, Absolute 0.18 0.00 - 0.40 10*3/mm3    Basophils, Absolute 0.04 0.00 - 0.20 10*3/mm3    Immature Grans, Absolute 0.06 (H) 0.00 - 0.05 10*3/mm3    nRBC 0.0 0.0 - 0.2 /100 WBC   Urine Drug Screen - Urine, Clean Catch    Specimen: Urine, Clean Catch   Result Value Ref Range    THC, Screen, Urine Negative Negative    Phencyclidine (PCP), Urine Negative Negative    Cocaine Screen, Urine Negative Negative    Methamphetamine, Ur Negative Negative    Opiate Screen Negative Negative    Amphetamine Screen, Urine Negative Negative    Benzodiazepine Screen, Urine Negative Negative    Tricyclic Antidepressants Screen Negative Negative    Methadone Screen, Urine Negative Negative     Barbiturates Screen, Urine Negative Negative    Oxycodone Screen, Urine Negative Negative    Propoxyphene Screen Negative Negative    Buprenorphine, Screen, Urine Negative Negative     *Note: Due to a large number of results and/or encounters for the requested time period, some results have not been displayed. A complete set of results can be found in Results Review.

## 2023-05-04 ENCOUNTER — HOSPITAL ENCOUNTER (OUTPATIENT)
Dept: CT IMAGING | Facility: HOSPITAL | Age: 28
Discharge: HOME | End: 2023-05-04
Payer: COMMERCIAL

## 2023-05-04 DIAGNOSIS — D18.00 CAVERNOUS HEMANGIOMA: ICD-10-CM

## 2023-05-04 DIAGNOSIS — R13.19 ESOPHAGEAL DYSPHAGIA: ICD-10-CM

## 2023-05-04 PROCEDURE — 25510000001 IOPAMIDOL 61 % SOLUTION: Performed by: NURSE PRACTITIONER

## 2023-05-04 PROCEDURE — 71260 CT THORAX DX C+: CPT

## 2023-05-04 RX ADMIN — IOPAMIDOL 90 ML: 612 INJECTION, SOLUTION INTRAVENOUS at 12:57

## 2023-05-19 ENCOUNTER — TELEPHONE (OUTPATIENT)
Dept: OTOLARYNGOLOGY | Facility: CLINIC | Age: 28
End: 2023-05-19
Payer: COMMERCIAL

## 2023-05-19 NOTE — TELEPHONE ENCOUNTER
Patient made aware      ----- Message from DEYA Webber sent at 5/19/2023  2:08 PM CDT -----  Contact: [phone]  I have message to radiology because they did not compare or mention on report how it compared to previous. Once I get addendum I will call her. Please let her know it will most likely be Monday.  ----- Message -----  From: Julia Stovall MA  Sent: 5/19/2023  10:21 AM CDT  To: DEYA Webber    Can you review and let me know if you want me to call or if you want to call?  ----- Message -----  From: Ngozi Chu  Sent: 5/19/2023  10:17 AM CDT  To: Julia Stovall MA    Patient would like you to call her with CT results.

## 2023-05-22 ENCOUNTER — ANESTHESIA (OUTPATIENT)
Dept: GASTROENTEROLOGY | Facility: HOSPITAL | Age: 28
End: 2023-05-22
Payer: COMMERCIAL

## 2023-05-22 ENCOUNTER — HOSPITAL ENCOUNTER (OUTPATIENT)
Facility: HOSPITAL | Age: 28
Setting detail: HOSPITAL OUTPATIENT SURGERY
Discharge: HOME | End: 2023-05-22
Attending: INTERNAL MEDICINE | Admitting: INTERNAL MEDICINE
Payer: COMMERCIAL

## 2023-05-22 ENCOUNTER — ANESTHESIA EVENT (OUTPATIENT)
Dept: GASTROENTEROLOGY | Facility: HOSPITAL | Age: 28
End: 2023-05-22
Payer: COMMERCIAL

## 2023-05-22 VITALS
OXYGEN SATURATION: 98 % | RESPIRATION RATE: 18 BRPM | HEIGHT: 65 IN | DIASTOLIC BLOOD PRESSURE: 57 MMHG | WEIGHT: 146 LBS | HEART RATE: 64 BPM | BODY MASS INDEX: 24.32 KG/M2 | SYSTOLIC BLOOD PRESSURE: 114 MMHG | TEMPERATURE: 97.2 F

## 2023-05-22 DIAGNOSIS — R13.19 ESOPHAGEAL DYSPHAGIA: ICD-10-CM

## 2023-05-22 LAB — B-HCG UR QL: NEGATIVE

## 2023-05-22 PROCEDURE — 81025 URINE PREGNANCY TEST: CPT | Performed by: INTERNAL MEDICINE

## 2023-05-22 PROCEDURE — 43248 EGD GUIDE WIRE INSERTION: CPT | Performed by: INTERNAL MEDICINE

## 2023-05-22 PROCEDURE — 43239 EGD BIOPSY SINGLE/MULTIPLE: CPT | Performed by: INTERNAL MEDICINE

## 2023-05-22 PROCEDURE — 25010000002 PROPOFOL 10 MG/ML EMULSION: Performed by: NURSE ANESTHETIST, CERTIFIED REGISTERED

## 2023-05-22 PROCEDURE — C1769 GUIDE WIRE: HCPCS | Performed by: INTERNAL MEDICINE

## 2023-05-22 RX ORDER — DEXTROSE AND SODIUM CHLORIDE 5; .45 G/100ML; G/100ML
30 INJECTION, SOLUTION INTRAVENOUS CONTINUOUS PRN
Status: DISCONTINUED | OUTPATIENT
Start: 2023-05-22 | End: 2023-05-22 | Stop reason: HOSPADM

## 2023-05-22 RX ORDER — PROPOFOL 10 MG/ML
VIAL (ML) INTRAVENOUS AS NEEDED
Status: DISCONTINUED | OUTPATIENT
Start: 2023-05-22 | End: 2023-05-22 | Stop reason: SURG

## 2023-05-22 RX ADMIN — PROPOFOL 300 MG: 10 INJECTION, EMULSION INTRAVENOUS at 15:59

## 2023-05-22 RX ADMIN — DEXTROSE AND SODIUM CHLORIDE 30 ML/HR: 5; 450 INJECTION, SOLUTION INTRAVENOUS at 15:00

## 2023-05-22 NOTE — ANESTHESIA PREPROCEDURE EVALUATION
Anesthesia Evaluation     Patient summary reviewed and Nursing notes reviewed   history of anesthetic complications: PONV  NPO Solid Status: > 8 hours  NPO Liquid Status: > 2 hours           Airway   Mallampati: II  TM distance: >3 FB  Neck ROM: full  No difficulty expected  Dental - normal exam     Pulmonary - normal exam   Cardiovascular - normal exam  Exercise tolerance: good (4-7 METS)        Neuro/Psych  (+) syncope, psychiatric history Anxiety and Depression,    GI/Hepatic/Renal/Endo    (+)  GERD,      Musculoskeletal     Abdominal  - normal exam   Substance History      OB/GYN          Other        ROS/Med Hx Other: Amide allergy noted                  Anesthesia Plan    ASA 2     general   total IV anesthesia  intravenous induction     Anesthetic plan, risks, benefits, and alternatives have been provided, discussed and informed consent has been obtained with: patient.        CODE STATUS:

## 2023-05-22 NOTE — H&P
No chief complaint on file.      Subjective    Marychuy Aburto is a 27 y.o. female. she is here today                                                                   Assessment & Plan                                     1. Esophageal dysphagia      Plan; schedule patient for EGD with dilation if indicated due to dysphagia also obtain biopsy to further evaluate.  We will schedule CT chest due to history of cavernous hemangioma of mediastinum and follow-up with Dr. Hayes if there are significant change in this.    Follow-up: No follow-ups on file.     HPI I agree with the current note with no changes in the history.  Risks and benefits discussed with patient. Patient understands these and would like to proceed with procedure.  27-year-old female presents to discuss dysphagia reports it started several months ago and has progressively worsened.  She has concurrent medical history of anxiety and depression.  She has history anterior hemangioma of mediastinum followed by Dr. Cortes until about 2018 demonstrated stability so she has not followed up with him over the last 6 years.  Reports he told her dysphagia may be an issue if it changed in size significantly.  Reports she has had some epigastric pain dysphagia started at with meats but occurs intermittently with most all solid foods.  Her weight has been stable states bowel movements have always been constipated and she has modified her diet to help regulate reports occasional hemorrhoidal pain but denies any melena or blood in the stool.    Review of Systems  Review of Systems   Constitutional: Negative for activity change, appetite change, chills, diaphoresis, fatigue, fever and unexpected weight change.   HENT: Positive for trouble swallowing (globus sensation started several months ago with meats ). Negative for sore throat.    Respiratory:  "Negative for shortness of breath.    Gastrointestinal: Positive for abdominal pain and constipation (states has had issues since childhood ). Negative for abdominal distention, anal bleeding, blood in stool, diarrhea, nausea, rectal pain and vomiting.        Drinks miralax, otc laxatives to have a bm 1-2 times per week    Musculoskeletal: Negative for arthralgias.   Skin: Negative for pallor.   Neurological: Negative for light-headedness.       /76 (Patient Position: Sitting)   Pulse 57   Temp 98.1 °F (36.7 °C) (Temporal)   Resp 18   Ht 165.1 cm (65\")   Wt 66.2 kg (146 lb)   SpO2 100%   BMI 24.30 kg/m²     Objective      Physical Exam  Constitutional:       General: She is not in acute distress.     Appearance: Normal appearance. She is normal weight. She is not ill-appearing.   HENT:      Head: Normocephalic and atraumatic.   Pulmonary:      Effort: Pulmonary effort is normal.   Abdominal:      General: Abdomen is flat. Bowel sounds are normal. There is no distension.      Palpations: Abdomen is soft. There is no mass.      Tenderness: There is no abdominal tenderness.   Neurological:      Mental Status: She is alert.               The following portions of the patient's history were reviewed and updated as appropriate:   Past Medical History:   Diagnosis Date   • Acute sinusitis    • Agoraphobia with panic attacks    • Chronic depression    • Corneal abrasion    • Dysfunction of eustachian tube    • Encounter for general counseling on prescription of oral contraceptives    • Generalized anxiety disorder    • Hemangioma     mediastinal cavernous [id number] stable      • Herpes simplex    • Iron deficiency anemia    • Laryngitis    • Malaise and fatigue    • Moderate Vaginal discharge     reports frequent yeast infections      • Otitis media    • Ovarian cyst    • PONV (postoperative nausea and vomiting)    • Streptococcal pharyngitis    • Syncope    • Tracheitis    • Verruca plantaris      Past Surgical " History:   Procedure Laterality Date   • KNEE ARTHROSCOPY  2009    x2   • MEDIASTINOSCOPY  2011    Mediastinoscopy. Cavernous hemangioma. Right paratracheal mass.   • WISDOM TOOTH EXTRACTION       Family History   Problem Relation Age of Onset   • Cancer Other    • Diabetes Other    • Heart disease Other    • Hypertension Other    • Hypertension Paternal Grandmother    • Diabetes Paternal Grandmother    • Breast cancer Paternal Grandmother      OB History        2    Para   1    Term   1            AB   1    Living   1       SAB   1    IAB        Ectopic        Molar        Multiple   0    Live Births   1              Allergies   Allergen Reactions   • Anesthetics, Amide Other (See Comments)     Quit breathing.   • Lorabid [Loracarbef] Rash     Social History     Socioeconomic History   • Marital status: Single   Tobacco Use   • Smoking status: Never   • Smokeless tobacco: Never   Vaping Use   • Vaping Use: Never used   Substance and Sexual Activity   • Alcohol use: No   • Drug use: No   • Sexual activity: Yes     Partners: Male     Current Medications:  Prior to Admission medications    Medication Sig Start Date End Date Taking? Authorizing Provider   clonazePAM (KlonoPIN) 0.5 MG tablet Take 1 tablet by mouth 2 (Two) Times a Day As Needed for Anxiety. 3/29/23  Yes Caroline Zarco APRN   pantoprazole (Protonix) 40 MG EC tablet Take 1 tablet by mouth Daily.  Patient not taking: Reported on 2023 3/29/23   Caroline Zarco APRN   fluvoxaMINE (LUVOX) 50 MG tablet Take 1 tablet by mouth Every Night. 21  Caroline Zarco APRN   levocetirizine (Xyzal) 5 MG tablet Take 1 tablet by mouth Every Evening. 21  Delores Smith APRN     Orders placed during this encounter include:  Orders Placed This Encounter   Procedures   • Pregnancy, Urine - Urine, Clean Catch     Standing Status:   Standing     Number of Occurrences:   1     Order Specific Question:    Release to patient     Answer:   Routine Release   • Implement Anesthesia Orders Day of Procedure     Standing Status:   Standing     Number of Occurrences:   1   • Obtain Informed Consent     Standing Status:   Standing     Number of Occurrences:   1     Order Specific Question:   Informed Consent Given For     Answer:   ESOPHAGOGASTRODUODENOSCOPY   • POC Glucose Once     Prior to Procedure on ALL Diabetic Patients     Standing Status:   Standing     Number of Occurrences:   1     Order Specific Question:   Release to patient     Answer:   Routine Release   • Insert Peripheral IV     Standing Status:   Standing     Number of Occurrences:   1     ESOPHAGOGASTRODUODENOSCOPY possible dilation (N/A)  New Medications Ordered This Visit   Medications   • dextrose 5 % and sodium chloride 0.45 % infusion         Review and/or summary of lab tests, radiology, procedures, medications. Review and summary of old records and obtaining of history. The risks and benefits of my recommendations, as well as other treatment options were discussed . Any questions/concerned were answered. Patient voiced understanding and agreement.          This document has been electronically signed by Favio Cody MD on May 22, 2023 15:10 CDT                                               Results for orders placed or performed during the hospital encounter of 05/22/23   Pregnancy, Urine - Urine, Clean Catch    Specimen: Urine, Clean Catch   Result Value Ref Range    HCG, Urine QL Negative Negative   Results for orders placed or performed during the hospital encounter of 05/18/23   POC Rapid Strep A    Specimen: Swab   Result Value Ref Range    Rapid Strep A Screen Positive (A)     Internal Control Passed     Lot Number #[id number]     Expiration Date 10/18/24    Results for orders placed or performed in visit on 03/29/23   CBC Auto Differential    Specimen: Blood   Result Value Ref Range    WBC 9.58 3.40 - 10.80 10*3/mm3    RBC 4.09 3.77 - 5.28  10*6/mm3    Hemoglobin 13.4 12.0 - 15.9 g/dL    Hematocrit 40.0 34.0 - 46.6 %    MCV 97.8 (H) 79.0 - 97.0 fL    MCH 32.8 26.6 - 33.0 pg    MCHC 33.5 31.5 - 35.7 g/dL    RDW 12.0 (L) 12.3 - 15.4 %    RDW-SD 43.0 37.0 - 54.0 fl    MPV 10.8 6.0 - 12.0 fL    Platelets 323 140 - 450 10*3/mm3    Neutrophil % 68.9 42.7 - 76.0 %    Lymphocyte % 20.8 19.6 - 45.3 %    Monocyte % 6.8 5.0 - 12.0 %    Eosinophil % 2.5 0.3 - 6.2 %    Basophil % 0.6 0.0 - 1.5 %    Immature Grans % 0.4 0.0 - 0.5 %    Neutrophils, Absolute 6.60 1.70 - 7.00 10*3/mm3    Lymphocytes, Absolute 1.99 0.70 - 3.10 10*3/mm3    Monocytes, Absolute 0.65 0.10 - 0.90 10*3/mm3    Eosinophils, Absolute 0.24 0.00 - 0.40 10*3/mm3    Basophils, Absolute 0.06 0.00 - 0.20 10*3/mm3    Immature Grans, Absolute 0.04 0.00 - 0.05 10*3/mm3    nRBC 0.0 0.0 - 0.2 /100 WBC   Urine Drug Screen - Urine, Clean Catch    Specimen: Urine, Clean Catch   Result Value Ref Range    THC, Screen, Urine Negative Negative    Phencyclidine (PCP), Urine Negative Negative    Cocaine Screen, Urine Negative Negative    Methamphetamine, Ur Negative Negative    Opiate Screen Negative Negative    Amphetamine Screen, Urine Negative Negative    Benzodiazepine Screen, Urine Negative Negative    Tricyclic Antidepressants Screen Negative Negative    Methadone Screen, Urine Negative Negative    Barbiturates Screen, Urine Negative Negative    Oxycodone Screen, Urine Negative Negative    Propoxyphene Screen Negative Negative    Buprenorphine, Screen, Urine Negative Negative   Vitamin D,25-Hydroxy    Specimen: Blood   Result Value Ref Range    25 Hydroxy, Vitamin D 38.2 30.0 - 100.0 ng/ml   TSH    Specimen: Blood   Result Value Ref Range    TSH 2.390 0.270 - 4.200 uIU/mL   Vitamin B12    Specimen: Blood   Result Value Ref Range    Vitamin B-12 424 211 - 946 pg/mL   Lipid Panel    Specimen: Blood   Result Value Ref Range    Total Cholesterol 200 0 - 200 mg/dL    Triglycerides 55 0 - 150 mg/dL    HDL  Cholesterol 67 (H) 40 - 60 mg/dL    LDL Cholesterol  123 (H) 0 - 100 mg/dL    VLDL Cholesterol 10 5 - 40 mg/dL    LDL/HDL Ratio 1.82    Comprehensive Metabolic Panel    Specimen: Blood   Result Value Ref Range    Glucose 87 65 - 99 mg/dL    BUN 13 6 - 20 mg/dL    Creatinine 0.88 0.57 - 1.00 mg/dL    Sodium 140 136 - 145 mmol/L    Potassium 5.1 3.5 - 5.2 mmol/L    Chloride 105 98 - 107 mmol/L    CO2 26.4 22.0 - 29.0 mmol/L    Calcium 10.1 8.6 - 10.5 mg/dL    Total Protein 6.9 6.0 - 8.5 g/dL    Albumin 4.2 3.5 - 5.2 g/dL    ALT (SGPT) 11 1 - 33 U/L    AST (SGOT) 19 1 - 32 U/L    Alkaline Phosphatase 66 39 - 117 U/L    Total Bilirubin 0.4 0.0 - 1.2 mg/dL    Globulin 2.7 gm/dL    A/G Ratio 1.6 g/dL    BUN/Creatinine Ratio 14.8 7.0 - 25.0    Anion Gap 8.6 5.0 - 15.0 mmol/L    eGFR 91.4 >60.0 mL/min/1.73   Results for orders placed or performed during the hospital encounter of 12/17/21   POCT SARS-CoV-2 Antigen MELANIE   ([facility])    Specimen: Swab   Result Value Ref Range    SARS Antigen Detected (A) Not Detected    Internal Control Passed Passed    Lot Number [id number]     Expiration Date 08/20/2023    Results for orders placed or performed during the hospital encounter of 07/28/21   COVID-19, [facility] IN-HOUSE, NP SWAB IN TRANSPORT MEDIA 8-10 HR TAT - Swab, Anterior nasal    Specimen: Anterior nasal; Swab   Result Value Ref Range    COVID19 Not Detected Not Detected - Ref. Range   Results for orders placed or performed in visit on 07/07/21   CBC Auto Differential    Specimen: Blood   Result Value Ref Range    WBC 11.56 (H) 3.40 - 10.80 10*3/mm3    RBC 4.60 3.77 - 5.28 10*6/mm3    Hemoglobin 14.4 12.0 - 15.9 g/dL    Hematocrit 43.9 34.0 - 46.6 %    MCV 95.4 79.0 - 97.0 fL    MCH 31.3 26.6 - 33.0 pg    MCHC 32.8 31.5 - 35.7 g/dL    RDW 13.8 12.3 - 15.4 %    RDW-SD 48.2 37.0 - 54.0 fl    MPV 10.1 6.0 - 12.0 fL    Platelets 307 140 - 450 10*3/mm3    Neutrophil % 73.3 42.7 - 76.0 %    Lymphocyte % 18.9 (L)  19.6 - 45.3 %    Monocyte % 5.4 5.0 - 12.0 %    Eosinophil % 1.6 0.3 - 6.2 %    Basophil % 0.3 0.0 - 1.5 %    Immature Grans % 0.5 0.0 - 0.5 %    Neutrophils, Absolute 8.48 (H) 1.70 - 7.00 10*3/mm3    Lymphocytes, Absolute 2.18 0.70 - 3.10 10*3/mm3    Monocytes, Absolute 0.62 0.10 - 0.90 10*3/mm3    Eosinophils, Absolute 0.18 0.00 - 0.40 10*3/mm3    Basophils, Absolute 0.04 0.00 - 0.20 10*3/mm3    Immature Grans, Absolute 0.06 (H) 0.00 - 0.05 10*3/mm3    nRBC 0.0 0.0 - 0.2 /100 WBC   Urine Drug Screen - Urine, Clean Catch    Specimen: Urine, Clean Catch   Result Value Ref Range    THC, Screen, Urine Negative Negative    Phencyclidine (PCP), Urine Negative Negative    Cocaine Screen, Urine Negative Negative    Methamphetamine, Ur Negative Negative    Opiate Screen Negative Negative    Amphetamine Screen, Urine Negative Negative    Benzodiazepine Screen, Urine Negative Negative    Tricyclic Antidepressants Screen Negative Negative    Methadone Screen, Urine Negative Negative    Barbiturates Screen, Urine Negative Negative    Oxycodone Screen, Urine Negative Negative    Propoxyphene Screen Negative Negative    Buprenorphine, Screen, Urine Negative Negative     *Note: Due to a large number of results and/or encounters for the requested time period, some results have not been displayed. A complete set of results can be found in Results Review.

## 2023-05-22 NOTE — ANESTHESIA POSTPROCEDURE EVALUATION
Patient: Marychuy Aburto    Procedure Summary     Date: 05/22/23 Room / Location: [facility] ENDOSCOPY 1 / [facility] ENDOSCOPY    Anesthesia Start: 1550 Anesthesia Stop: 1609    Procedure: ESOPHAGOGASTRODUODENOSCOPY possible dilation Diagnosis:       Esophageal dysphagia      (Esophageal dysphagia [R13.19])    Surgeons: Favio Cody MD Provider: Markie Gagnon CRNA    Anesthesia Type: general ASA Status: 2          Anesthesia Type: general    Vitals  No vitals data found for the desired time range.          Post Anesthesia Care and Evaluation    Patient location during evaluation: PHASE II  Patient participation: complete - patient participated  Level of consciousness: sleepy but conscious  Pain score: 0  Pain management: adequate    Airway patency: patent  Anesthetic complications: No anesthetic complications  PONV Status: none  Cardiovascular status: acceptable  Respiratory status: acceptable  Hydration status: acceptable    Comments: HR 80  /60  RR 16  O2 sats 99%  Temp 98.0F  No anesthesia care post op

## 2023-05-25 LAB — REF LAB TEST METHOD: NORMAL

## 2023-06-05 ENCOUNTER — OFFICE VISIT (OUTPATIENT)
Dept: GASTROENTEROLOGY | Facility: CLINIC | Age: 28
End: 2023-06-05
Payer: COMMERCIAL

## 2023-06-05 VITALS
HEIGHT: 65 IN | SYSTOLIC BLOOD PRESSURE: 120 MMHG | HEART RATE: 62 BPM | WEIGHT: 146 LBS | BODY MASS INDEX: 24.32 KG/M2 | DIASTOLIC BLOOD PRESSURE: 70 MMHG

## 2023-06-05 DIAGNOSIS — K22.2 STRICTURE AND STENOSIS OF ESOPHAGUS: Primary | ICD-10-CM

## 2023-06-05 DIAGNOSIS — K21.00 GASTROESOPHAGEAL REFLUX DISEASE WITH ESOPHAGITIS WITHOUT HEMORRHAGE: ICD-10-CM

## 2023-06-05 DIAGNOSIS — D18.00 CAVERNOUS HEMANGIOMA: ICD-10-CM

## 2023-06-05 DIAGNOSIS — K59.04 CHRONIC IDIOPATHIC CONSTIPATION: ICD-10-CM

## 2023-06-05 PROCEDURE — 99213 OFFICE O/P EST LOW 20 MIN: CPT | Performed by: NURSE PRACTITIONER

## 2023-06-05 RX ORDER — OMEPRAZOLE 40 MG/1
40 CAPSULE, DELAYED RELEASE ORAL DAILY
Qty: 30 CAPSULE | Refills: 11 | Status: SHIPPED | OUTPATIENT
Start: 2023-06-05

## 2023-06-05 NOTE — PROGRESS NOTES
Chief Complaint   Patient presents with    Difficulty Swallowing       Subjective    Marychuy Aburto is a 27 y.o. female. she is here today for follow-up.                                                                  Assessment & Plan                                     1. Stricture and stenosis of esophagus    2. Chronic idiopathic constipation    3. Gastroesophageal reflux disease with esophagitis without hemorrhage    4. Cavernous hemangioma      Plan; Start patient on omeprazole daily for chronic reflux strick irritants follow standard antireflux measures.  Start patient on Linzess 145 daily contact office if increased dosage is needed or if unable to tolerate medication.  CT findings reassuring stable from previous CT discussed referral back to Dr. Hayes patient declines since he followed for 5 years and said as long as it remains stable did not recommend any intervention.    Follow-up: Return in about 4 weeks (around 7/3/2023) for Recheck, After test.     HPI  27-year-old female presents for follow-up after EGD and CT of chest.  Reports dysphagia has been better since dilation.  Still has some intermittent reflux symptoms has had increasing with constipation typically goes 1 time per week and takes MiraLAX over-the-counter laxative drinks water is physically active and eats a lot of fruits and vegetables.  EGD noted benign-appearing stenosis dilated 54 Slovak grade 3 esophagitis gastritis and normal duodenum.  Antral biopsy noted reactive gastropathy.  Esophageal biopsy noted reactive squamous mucosa.  CT chest attached below.  IMPRESSION:  1.  Large soft tissue prominence in the right paratracheal region which clearly  enhances.  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     2.  A 1.9 cm low-density in the central aspect of the liver may represent a  small hemangioma or cyst.  It cannot be further evaluated on this single phase  contrast-enhanced study.  Ultrasound or MR would further evaluate.  Overall size of the vascular anomaly in the right paratracheal region is not  significantly changed when compared with 1/5/2018. The prior study was done  without contrast enhancement. Therefore, any additional evaluation of  enhancement or which vessels are involved cannot be performed.  It actually is  also unchanged in size when compared with a more remote study of 12/11/2015.  Review of Systems  Review of Systems   Constitutional:  Positive for fatigue. Negative for activity change, appetite change, chills, diaphoresis, fever and unexpected weight change.   HENT:  Positive for trouble swallowing (better since dilation). Negative for sore throat.    Respiratory:  Negative for shortness of breath.    Gastrointestinal:  Positive for abdominal pain and constipation. Negative for abdominal distention, anal bleeding, blood in stool, diarrhea, nausea, rectal pain and vomiting.   Musculoskeletal:  Negative for arthralgias.   Skin:  Negative for pallor.   Neurological:  Negative for light-headedness.     /70 (BP Location: Left arm)   Pulse 62   Ht 165.1 cm (65\")   Wt 66.2 kg (146 lb)   BMI 24.30 kg/m²     Objective      Physical Exam  Constitutional:       General: She is not in acute distress.     Appearance: Normal appearance. She is normal weight. She is not ill-appearing or toxic-appearing.   HENT:      Head: Normocephalic and atraumatic.   Pulmonary:      Effort: Pulmonary effort is normal.   Abdominal:      General: Abdomen is flat. Bowel sounds are normal. There is no distension.      Palpations: Abdomen is soft. There is no mass.      Tenderness: There is no abdominal " tenderness.   Neurological:      Mental Status: She is alert.             The following portions of the patient's history were reviewed and updated as appropriate:   Past Medical History:   Diagnosis Date    Acute sinusitis     Agoraphobia with panic attacks     Chronic depression     Corneal abrasion     Dysfunction of eustachian tube     Encounter for general counseling on prescription of oral contraceptives     Generalized anxiety disorder     Hemangioma     mediastinal cavernous [id number] stable       Herpes simplex     Iron deficiency anemia     Laryngitis     Malaise and fatigue     Moderate Vaginal discharge     reports frequent yeast infections       Otitis media     Ovarian cyst     PONV (postoperative nausea and vomiting)     Streptococcal pharyngitis     Syncope     Tracheitis     Verruca plantaris      Past Surgical History:   Procedure Laterality Date    ENDOSCOPY N/A 2023    Procedure: ESOPHAGOGASTRODUODENOSCOPY possible dilation;  Surgeon: Favio Cody MD;  Location: [facility] ENDOSCOPY;  Service: Gastroenterology;  Laterality: N/A;    KNEE ARTHROSCOPY  2009    x2    MEDIASTINOSCOPY  2011    Mediastinoscopy. Cavernous hemangioma. Right paratracheal mass.    WISDOM TOOTH EXTRACTION       Family History   Problem Relation Age of Onset    Cancer Other     Diabetes Other     Heart disease Other     Hypertension Other     Hypertension Paternal Grandmother     Diabetes Paternal Grandmother     Breast cancer Paternal Grandmother      OB History          2    Para   1    Term   1            AB   1    Living   1         SAB   1    IAB        Ectopic        Molar        Multiple   0    Live Births   1              Allergies   Allergen Reactions    Anesthetics, Amide Other (See Comments)     Quit breathing.    Lorabid [Loracarbef] Rash     Social History     Socioeconomic History    Marital status: Single   Tobacco Use    Smoking status: Never    Smokeless tobacco: Never   Vaping Use     Vaping Use: Never used   Substance and Sexual Activity    Alcohol use: No    Drug use: No    Sexual activity: Yes     Partners: Male     Current Medications:  Prior to Admission medications    Medication Sig Start Date End Date Taking? Authorizing Provider   clonazePAM (KlonoPIN) 0.5 MG tablet Take 1 tablet by mouth 2 (Two) Times a Day As Needed for Anxiety. 3/29/23  Yes Caroline Zarco APRN   fluvoxaMINE (LUVOX) 50 MG tablet Take 1 tablet by mouth Every Night. 7/28/21 12/17/21  Caroline Zarco APRN   levocetirizine (Xyzal) 5 MG tablet Take 1 tablet by mouth Every Evening. 5/25/21 12/17/21  Delores Smith APRN     Orders placed during this encounter include:  No orders of the defined types were placed in this encounter.    * Surgery not found *  New Medications Ordered This Visit   Medications    omeprazole (priLOSEC) 40 MG capsule     Sig: Take 1 capsule by mouth Daily.     Dispense:  30 capsule     Refill:  11    linaclotide (Linzess) 290 MCG capsule capsule     Sig: Take 1 capsule by mouth Every Morning Before Breakfast.     Dispense:  30 capsule     Refill:  1         Review and/or summary of lab tests, radiology, procedures, medications. Review and summary of old records and obtaining of history. The risks and benefits of my recommendations, as well as other treatment options were discussed . Any questions/concerned were answered. Patient voiced understanding and agreement.          This document has been electronically signed by DEYA Leger on Michelle 15, 2023 13:02 CDT                                               Results for orders placed or performed during the hospital encounter of 05/22/23   TISSUE EXAM, [facility] ([facility])    Specimen: A: Gastric, Antrum; Tissue    B: Esophagus, Distal; Tissue   Result Value Ref Range    Reference Lab Report       [facility]  [address] [place] [address]  Phone: [phone] or [phone]  Fax:  "[phone]  Adolfo Aguilera M.D., Medical Director    PATIENT NAME                                     LABORATORY NO.  VINCE CHANEL.                              [id number]  [id number]                                 AGE                    SEX   N              CLIENT REF #  [facility]                   27        1995      F     [id number]      [id number]    [employer]                               REQUESTING M.D.           ATTENDING M.D.         COPY TO.  [address] [place] [address]                         [place] [address]                     DATE COLLECTED            DATE RECEIVED          DATE REPORTED  2023    DIAGNOSIS:  A.     ANTRUM, BIOPSY:  Reactive gastropathy  B.     ESOPHAGUS, BIOPSY, DISTAL:  Reactive squamous  mucosa    JBS/sdl    CLINICAL HISTORY:  Esophageal dysphagia    SPECIMENS RECEIVED:  A.    ANTRUM, BIOPSY  B.    ESOPHAGUS, BIOPSY, DISTAL    MICROSCOPIC DESCRIPTION:  Tissue blocks are prepared and slides are examined microscopically on all  specimens. See diagnosis for details.    Professional interpretation rendered by Juan Motta M.D. at [facility], [address] [place] [address].    GROSS DESCRIPTION:  A.    Labeled \"antrum biopsy\".  Consists of 1 piece of tan soft tissue measuring  0.3 x 0.2 x 0.2 cm and is submitted entirely in 1 block.  CASH  B.    Labeled \"distal esophagus biopsy\".  Consists of 2 pieces of tan soft tissue  measuring 0.4 x 0.2 x 0.2 cm in aggregate and is submitted entirely in 1  block.    REVIEWED, DIAGNOSED AND ELECTRONICALLY  SIGNED BY:    Juan Motta M.D.  CPT CODES:  88305x2     Pregnancy, Urine - Urine, Clean Catch    Specimen: Urine, Clean Catch   Result Value Ref Range    HCG, Urine QL Negative Negative   Results for orders placed or performed during the hospital encounter of 23   POC Rapid Strep " A    Specimen: Swab   Result Value Ref Range    Rapid Strep A Screen Positive (A)     Internal Control Passed     Lot Number #[id number]     Expiration Date 10/18/24    Results for orders placed or performed in visit on 03/29/23   CBC Auto Differential    Specimen: Blood   Result Value Ref Range    WBC 9.58 3.40 - 10.80 10*3/mm3    RBC 4.09 3.77 - 5.28 10*6/mm3    Hemoglobin 13.4 12.0 - 15.9 g/dL    Hematocrit 40.0 34.0 - 46.6 %    MCV 97.8 (H) 79.0 - 97.0 fL    MCH 32.8 26.6 - 33.0 pg    MCHC 33.5 31.5 - 35.7 g/dL    RDW 12.0 (L) 12.3 - 15.4 %    RDW-SD 43.0 37.0 - 54.0 fl    MPV 10.8 6.0 - 12.0 fL    Platelets 323 140 - 450 10*3/mm3    Neutrophil % 68.9 42.7 - 76.0 %    Lymphocyte % 20.8 19.6 - 45.3 %    Monocyte % 6.8 5.0 - 12.0 %    Eosinophil % 2.5 0.3 - 6.2 %    Basophil % 0.6 0.0 - 1.5 %    Immature Grans % 0.4 0.0 - 0.5 %    Neutrophils, Absolute 6.60 1.70 - 7.00 10*3/mm3    Lymphocytes, Absolute 1.99 0.70 - 3.10 10*3/mm3    Monocytes, Absolute 0.65 0.10 - 0.90 10*3/mm3    Eosinophils, Absolute 0.24 0.00 - 0.40 10*3/mm3    Basophils, Absolute 0.06 0.00 - 0.20 10*3/mm3    Immature Grans, Absolute 0.04 0.00 - 0.05 10*3/mm3    nRBC 0.0 0.0 - 0.2 /100 WBC   Urine Drug Screen - Urine, Clean Catch    Specimen: Urine, Clean Catch   Result Value Ref Range    THC, Screen, Urine Negative Negative    Phencyclidine (PCP), Urine Negative Negative    Cocaine Screen, Urine Negative Negative    Methamphetamine, Ur Negative Negative    Opiate Screen Negative Negative    Amphetamine Screen, Urine Negative Negative    Benzodiazepine Screen, Urine Negative Negative    Tricyclic Antidepressants Screen Negative Negative    Methadone Screen, Urine Negative Negative    Barbiturates Screen, Urine Negative Negative    Oxycodone Screen, Urine Negative Negative    Propoxyphene Screen Negative Negative    Buprenorphine, Screen, Urine Negative Negative   Vitamin D,25-Hydroxy    Specimen: Blood   Result Value Ref Range    25 Hydroxy,  Vitamin D 38.2 30.0 - 100.0 ng/ml   TSH    Specimen: Blood   Result Value Ref Range    TSH 2.390 0.270 - 4.200 uIU/mL   Vitamin B12    Specimen: Blood   Result Value Ref Range    Vitamin B-12 424 211 - 946 pg/mL   Lipid Panel    Specimen: Blood   Result Value Ref Range    Total Cholesterol 200 0 - 200 mg/dL    Triglycerides 55 0 - 150 mg/dL    HDL Cholesterol 67 (H) 40 - 60 mg/dL    LDL Cholesterol  123 (H) 0 - 100 mg/dL    VLDL Cholesterol 10 5 - 40 mg/dL    LDL/HDL Ratio 1.82    Comprehensive Metabolic Panel    Specimen: Blood   Result Value Ref Range    Glucose 87 65 - 99 mg/dL    BUN 13 6 - 20 mg/dL    Creatinine 0.88 0.57 - 1.00 mg/dL    Sodium 140 136 - 145 mmol/L    Potassium 5.1 3.5 - 5.2 mmol/L    Chloride 105 98 - 107 mmol/L    CO2 26.4 22.0 - 29.0 mmol/L    Calcium 10.1 8.6 - 10.5 mg/dL    Total Protein 6.9 6.0 - 8.5 g/dL    Albumin 4.2 3.5 - 5.2 g/dL    ALT (SGPT) 11 1 - 33 U/L    AST (SGOT) 19 1 - 32 U/L    Alkaline Phosphatase 66 39 - 117 U/L    Total Bilirubin 0.4 0.0 - 1.2 mg/dL    Globulin 2.7 gm/dL    A/G Ratio 1.6 g/dL    BUN/Creatinine Ratio 14.8 7.0 - 25.0    Anion Gap 8.6 5.0 - 15.0 mmol/L    eGFR 91.4 >60.0 mL/min/1.73   Results for orders placed or performed during the hospital encounter of 12/17/21   POCT SARS-CoV-2 Antigen MELANIE   ([facility])    Specimen: Swab   Result Value Ref Range    SARS Antigen Detected (A) Not Detected    Internal Control Passed Passed    Lot Number [id number]     Expiration Date 08/20/2023    Results for orders placed or performed during the hospital encounter of 07/28/21   COVID-19, [facility] IN-HOUSE, NP SWAB IN TRANSPORT MEDIA 8-10 HR TAT - Swab, Anterior nasal    Specimen: Anterior nasal; Swab   Result Value Ref Range    COVID19 Not Detected Not Detected - Ref. Range   Results for orders placed or performed in visit on 07/07/21   CBC Auto Differential    Specimen: Blood   Result Value Ref Range    WBC 11.56 (H) 3.40 - 10.80 10*3/mm3    RBC 4.60  3.77 - 5.28 10*6/mm3    Hemoglobin 14.4 12.0 - 15.9 g/dL    Hematocrit 43.9 34.0 - 46.6 %    MCV 95.4 79.0 - 97.0 fL    MCH 31.3 26.6 - 33.0 pg    MCHC 32.8 31.5 - 35.7 g/dL    RDW 13.8 12.3 - 15.4 %    RDW-SD 48.2 37.0 - 54.0 fl    MPV 10.1 6.0 - 12.0 fL    Platelets 307 140 - 450 10*3/mm3    Neutrophil % 73.3 42.7 - 76.0 %    Lymphocyte % 18.9 (L) 19.6 - 45.3 %    Monocyte % 5.4 5.0 - 12.0 %    Eosinophil % 1.6 0.3 - 6.2 %    Basophil % 0.3 0.0 - 1.5 %    Immature Grans % 0.5 0.0 - 0.5 %    Neutrophils, Absolute 8.48 (H) 1.70 - 7.00 10*3/mm3    Lymphocytes, Absolute 2.18 0.70 - 3.10 10*3/mm3    Monocytes, Absolute 0.62 0.10 - 0.90 10*3/mm3    Eosinophils, Absolute 0.18 0.00 - 0.40 10*3/mm3    Basophils, Absolute 0.04 0.00 - 0.20 10*3/mm3    Immature Grans, Absolute 0.06 (H) 0.00 - 0.05 10*3/mm3    nRBC 0.0 0.0 - 0.2 /100 WBC     *Note: Due to a large number of results and/or encounters for the requested time period, some results have not been displayed. A complete set of results can be found in Results Review.

## 2023-06-16 DIAGNOSIS — R10.30 LOWER ABDOMINAL PAIN: ICD-10-CM

## 2023-06-16 DIAGNOSIS — K59.04 CHRONIC IDIOPATHIC CONSTIPATION: Primary | ICD-10-CM
